# Patient Record
Sex: FEMALE | Race: WHITE | Employment: OTHER | URBAN - METROPOLITAN AREA
[De-identification: names, ages, dates, MRNs, and addresses within clinical notes are randomized per-mention and may not be internally consistent; named-entity substitution may affect disease eponyms.]

---

## 2019-01-11 ENCOUNTER — APPOINTMENT (OUTPATIENT)
Dept: GENERAL RADIOLOGY | Age: 79
End: 2019-01-11
Attending: EMERGENCY MEDICINE
Payer: MEDICARE

## 2019-01-11 ENCOUNTER — HOSPITAL ENCOUNTER (EMERGENCY)
Age: 79
Discharge: HOME | End: 2019-01-12
Attending: EMERGENCY MEDICINE
Payer: MEDICARE

## 2019-01-11 VITALS
DIASTOLIC BLOOD PRESSURE: 78 MMHG | SYSTOLIC BLOOD PRESSURE: 182 MMHG | BODY MASS INDEX: 41.16 KG/M2 | RESPIRATION RATE: 22 BRPM | HEIGHT: 61 IN | TEMPERATURE: 97.6 F | WEIGHT: 218 LBS | HEART RATE: 60 BPM | OXYGEN SATURATION: 95 %

## 2019-01-11 DIAGNOSIS — J06.9 RECENT URI: ICD-10-CM
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
## 2019-01-11 DIAGNOSIS — S60.221A CONTUSION OF RIGHT HAND, INITIAL ENCOUNTER: ICD-10-CM

## 2019-01-11 DIAGNOSIS — S62.91XA CLOSED FRACTURE OF RIGHT HAND, INITIAL ENCOUNTER: Primary | ICD-10-CM

## 2019-01-11 DIAGNOSIS — T14.8XXA HEMATOMA: ICD-10-CM

## 2019-01-11 DIAGNOSIS — R53.81 PHYSICAL DECONDITIONING: ICD-10-CM

## 2019-01-11 PROCEDURE — 73130 X-RAY EXAM OF HAND: CPT

## 2019-01-11 PROCEDURE — 71045 X-RAY EXAM CHEST 1 VIEW: CPT

## 2019-01-11 PROCEDURE — 99282 EMERGENCY DEPT VISIT SF MDM: CPT

## 2019-01-12 LAB
ALBUMIN SERPL-MCNC: 3.5 G/DL (ref 3.5–5)
ALBUMIN/GLOB SERPL: 1 {RATIO} (ref 1.1–2.2)
ALP SERPL-CCNC: 96 U/L (ref 45–117)
ALT SERPL-CCNC: 33 U/L (ref 12–78)
ANION GAP SERPL CALC-SCNC: 4 MMOL/L (ref 5–15)
APPEARANCE UR: CLEAR
AST SERPL-CCNC: 33 U/L (ref 15–37)
BACTERIA URNS QL MICRO: NEGATIVE /HPF
BASOPHILS # BLD: 0 K/UL (ref 0–0.1)
BASOPHILS NFR BLD: 0 % (ref 0–1)
BILIRUB SERPL-MCNC: 0.4 MG/DL (ref 0.2–1)
BILIRUB UR QL CFM: NEGATIVE
BUN SERPL-MCNC: 20 MG/DL (ref 6–20)
BUN/CREAT SERPL: 16 (ref 12–20)
CALCIUM SERPL-MCNC: 9.2 MG/DL (ref 8.5–10.1)
CHLORIDE SERPL-SCNC: 101 MMOL/L (ref 97–108)
CO2 SERPL-SCNC: 31 MMOL/L (ref 21–32)
COLOR UR: NORMAL
CREAT SERPL-MCNC: 1.22 MG/DL (ref 0.55–1.02)
DIFFERENTIAL METHOD BLD: ABNORMAL
EOSINOPHIL # BLD: 0.2 K/UL (ref 0–0.4)
EOSINOPHIL NFR BLD: 1 % (ref 0–7)
EPITH CASTS URNS QL MICRO: NORMAL /LPF
ERYTHROCYTE [DISTWIDTH] IN BLOOD BY AUTOMATED COUNT: 13.9 % (ref 11.5–14.5)
GLOBULIN SER CALC-MCNC: 3.5 G/DL (ref 2–4)
GLUCOSE SERPL-MCNC: 94 MG/DL (ref 65–100)
GLUCOSE UR STRIP.AUTO-MCNC: NEGATIVE MG/DL
HCT VFR BLD AUTO: 43.1 % (ref 35–47)
HGB BLD-MCNC: 14 G/DL (ref 11.5–16)
HGB UR QL STRIP: NEGATIVE
HYALINE CASTS URNS QL MICRO: NORMAL /LPF (ref 0–5)
IMM GRANULOCYTES # BLD AUTO: 0.1 K/UL (ref 0–0.04)
IMM GRANULOCYTES NFR BLD AUTO: 1 % (ref 0–0.5)
KETONES UR QL STRIP.AUTO: NEGATIVE MG/DL
LEUKOCYTE ESTERASE UR QL STRIP.AUTO: NEGATIVE
LYMPHOCYTES # BLD: 1.1 K/UL (ref 0.8–3.5)
LYMPHOCYTES NFR BLD: 9 % (ref 12–49)
MCH RBC QN AUTO: 31.1 PG (ref 26–34)
MCHC RBC AUTO-ENTMCNC: 32.5 G/DL (ref 30–36.5)
MCV RBC AUTO: 95.8 FL (ref 80–99)
MONOCYTES # BLD: 1 K/UL (ref 0–1)
MONOCYTES NFR BLD: 7 % (ref 5–13)
NEUTS SEG # BLD: 10.9 K/UL (ref 1.8–8)
NEUTS SEG NFR BLD: 82 % (ref 32–75)
NITRITE UR QL STRIP.AUTO: NEGATIVE
NRBC # BLD: 0 K/UL (ref 0–0.01)
NRBC BLD-RTO: 0 PER 100 WBC
PH UR STRIP: 5.5 [PH] (ref 5–8)
PLATELET # BLD AUTO: 240 K/UL (ref 150–400)
PMV BLD AUTO: 9.6 FL (ref 8.9–12.9)
POTASSIUM SERPL-SCNC: 4.7 MMOL/L (ref 3.5–5.1)
PROT SERPL-MCNC: 7 G/DL (ref 6.4–8.2)
PROT UR STRIP-MCNC: NEGATIVE MG/DL
RBC # BLD AUTO: 4.5 M/UL (ref 3.8–5.2)
RBC #/AREA URNS HPF: NORMAL /HPF (ref 0–5)
SODIUM SERPL-SCNC: 136 MMOL/L (ref 136–145)
SP GR UR REFRACTOMETRY: 1.02 (ref 1–1.03)
UA: UC IF INDICATED,UAUC: NORMAL
UROBILINOGEN UR QL STRIP.AUTO: 0.2 EU/DL (ref 0.2–1)
WBC # BLD AUTO: 13.2 K/UL (ref 3.6–11)
WBC URNS QL MICRO: NORMAL /HPF (ref 0–4)

## 2019-01-12 PROCEDURE — 80053 COMPREHEN METABOLIC PANEL: CPT

## 2019-01-12 PROCEDURE — 77030005110 HC CATH GASTMY BTN BARD -B

## 2019-01-12 PROCEDURE — 75810000053 HC SPLINT APPLICATION

## 2019-01-12 PROCEDURE — 85025 COMPLETE CBC W/AUTO DIFF WBC: CPT

## 2019-01-12 PROCEDURE — 81001 URINALYSIS AUTO W/SCOPE: CPT

## 2019-01-12 PROCEDURE — 36415 COLL VENOUS BLD VENIPUNCTURE: CPT

## 2019-01-12 PROCEDURE — 77030011943

## 2019-01-12 RX ORDER — OXYCODONE HYDROCHLORIDE 5 MG/1
TABLET ORAL
Status: DISCONTINUED
Start: 2019-01-12 | End: 2019-01-12 | Stop reason: HOSPADM

## 2019-01-12 NOTE — DISCHARGE INSTRUCTIONS
Patient Education        Hand Bruises: Care Instructions  Your Care Instructions  Bruises, or contusions, can happen as a result of an impact or fall. Most people think of a bruise as a black-and-blue spot. This happens when small blood vessels get torn and leak blood under the skin. The bruise may turn purplish black, reddish blue, or yellowish green as it heals. But bones and muscles can also get bruised. This may damage the hand but not cause a bruise that you can see. Most bruises aren't serious and will go away on their own in 2 to 4 weeks. But sometimes a more serious hand injury might not heal on its own. Tell your doctor if you have new symptoms or your injury is not getting better over time. You may have tests to see if you have bone or nerve damage. These tests may include X-rays, a CT scan, or an MRI. If you damaged bones or muscles, you may need more treatment. The doctor has checked you carefully, but problems can develop later. If you notice any problems or new symptoms, get medical treatment right away. Follow-up care is a key part of your treatment and safety. Be sure to make and go to all appointments, and call your doctor if you are having problems. It's also a good idea to know your test results and keep a list of the medicines you take. How can you care for yourself at home? · Put ice or a cold pack on the hand for 10 to 20 minutes at a time. Put a thin cloth between the ice and your skin. · Prop up your hand on a pillow when you ice it or anytime you sit or lie down during the next 3 days. Try to keep your hand above the level of your heart. This will help reduce swelling. · Be safe with medicines. Read and follow all instructions on the label. ? If the doctor gave you a prescription medicine for pain, take it as prescribed. ? If you are not taking a prescription pain medicine, ask your doctor if you can take an over-the-counter medicine.   · Be sure to follow your doctor's advice about moving and exercising your injured hand. When should you call for help? Call your doctor now or seek immediate medical care if:    · Your pain gets worse.     · You have new or worse swelling.     · You have tingling, weakness, or numbness in the area near the bruise.     · The area near the bruise is cold or pale.     · You have symptoms of infection, such as:  ? Increased pain, swelling, warmth, or redness. ? Red streaks leading from the area. ? Pus draining from the area. ? A fever.    Watch closely for changes in your health, and be sure to contact your doctor if:    · You do not get better as expected. Where can you learn more? Go to http://carlene-abhay.info/. Enter N538 in the search box to learn more about \"Hand Bruises: Care Instructions. \"  Current as of: November 20, 2017  Content Version: 11.8  © 1945-8438 [employer]. Care instructions adapted under license by [employer] (which disclaims liability or warranty for this information). If you have questions about a medical condition or this instruction, always ask your healthcare professional. [place] [address] any warranty or liability for your use of this information. Patient Education        Preventing Falls: Care Instructions  Your Care Instructions    Getting around your home safely can be a challenge if you have injuries or health problems that make it easy for you to fall. Loose rugs and furniture in walkways are among the dangers for many older people who have problems walking or who have poor eyesight. People who have conditions such as arthritis, osteoporosis, or dementia also have to be careful not to fall. You can make your home safer with a few simple measures. Follow-up care is a key part of your treatment and safety. Be sure to make and go to all appointments, and call your doctor if you are having problems.  It's also a good idea to know your test results and keep a list of the medicines you take. How can you care for yourself at home? Taking care of yourself  · You may get dizzy if you do not drink enough water. To prevent dehydration, drink plenty of fluids, enough so that your urine is light yellow or clear like water. Choose water and other caffeine-free clear liquids. If you have kidney, heart, or liver disease and have to limit fluids, talk with your doctor before you increase the amount of fluids you drink. · Exercise regularly to improve your strength, muscle tone, and balance. Walk if you can. Swimming may be a good choice if you cannot walk easily. · Have your vision and hearing checked each year or any time you notice a change. If you have trouble seeing and hearing, you might not be able to avoid objects and could lose your balance. · Know the side effects of the medicines you take. Ask your doctor or pharmacist whether the medicines you take can affect your balance. Sleeping pills or sedatives can affect your balance. · Limit the amount of alcohol you drink. Alcohol can impair your balance and other senses. · Ask your doctor whether calluses or corns on your feet need to be removed. If you wear loose-fitting shoes because of calluses or corns, you can lose your balance and fall. · Talk to your doctor if you have numbness in your feet. Preventing falls at home  · Remove raised doorway thresholds, throw rugs, and clutter. Repair loose carpet or raised areas in the floor. · Move furniture and electrical cords to keep them out of walking paths. · Use nonskid floor wax, and wipe up spills right away, especially on ceramic tile floors. · If you use a walker or cane, put rubber tips on it. If you use crutches, clean the bottoms of them regularly with an abrasive pad, such as steel wool. · Keep your house well lit, especially [place], and outside walkways. Use night-lights in areas such as hallways and bathrooms.  Add extra light switches or use remote switches (such as switches that go on or off when you clap your hands) to make it easier to turn lights on if you have to get up during the night. · Install sturdy handrails on stairways. · Move items in your cabinets so that the things you use a lot are on the lower shelves (about waist level). · Keep a cordless phone and a flashlight with new batteries by your bed. If possible, put a phone in each of the main rooms of your house, or carry a cell phone in case you fall and cannot reach a phone. Or, you can wear a device around your neck or wrist. You push a button that sends a signal for help. · Wear low-heeled shoes that fit well and give your feet good support. Use footwear with nonskid soles. Check the heels and soles of your shoes for wear. Repair or replace worn heels or soles. · Do not wear socks without shoes on wood floors. · Walk on the grass when the sidewalks are slippery. If you live in an area that gets snow and ice in the winter, sprinkle salt on slippery steps and sidewalks. Preventing falls in the bath  · Install grab bars and nonskid mats inside and outside your shower or tub and near the toilet and sinks. · Use shower chairs and bath benches. · Use a hand-held shower head that will allow you to sit while showering. · Get into a tub or shower by putting the weaker leg in first. Get out of a tub or shower with your strong side first.  · Repair loose toilet seats and consider installing a raised toilet seat to make getting on and off the toilet easier. · Keep your bathroom door unlocked while you are in the shower. Where can you learn more? Go to http://carlene-abhay.info/. Enter 0476 [id number] in the search box to learn more about \"Preventing Falls: Care Instructions. \"  Current as of: March 16, 2018  Content Version: 11.8  © 3670-7728 [employer].  Care instructions adapted under license by [employer] (which disclaims liability or warranty for this information). If you have questions about a medical condition or this instruction, always ask your healthcare professional. [place] [address] any warranty or liability for your use of this information.

## 2019-01-12 NOTE — ED NOTES
Dr. Steph Flower gave and reviewed discharge instructions with the patient. The patient verbalized understanding. The patient was given opportunity for questions. Patient discharged in stable condition to the waiting room via wheelchair with male and female visitors.

## 2019-01-12 NOTE — ED TRIAGE NOTES
Pt states she fell this morning and injured her rt hand and then fell again at [address] and hurt her back. Pt states her legs have been giving out.

## 2019-01-12 NOTE — ED PROVIDER NOTES
EMERGENCY DEPARTMENT HISTORY AND PHYSICAL EXAM      Date: 1/11/2019  Patient Name: Rizwana Cano    History of Presenting Illness     Chief Complaint   Patient presents with    Fall    Hand Pain       History Provided By: Patient and Patient's Daughter    HPI: Rizwana Cano, 67 y.o. female with PMHx significant for stroke, breast cancer, HTN, arthritis, GERD, presents via wheelchair to the ED with cc of acute onset right hand pain and swelling s/p GLF this morning. The pt reports she landed on her arm. She states she has been icing her hand all day. The pt additionally reports another fall today around 1930 this evening, noting her \"legs gave out. \" The pt notes mild back pain s/p the second fall. Per daughter, the pt was on 2 rounds of antibiotics recently for a URI. The pt notes generalized weakness from her sickness. However, she states her cold has improved. Pt notes she used inhalers in the past but has not been diagnosed asthma or COPD. Pt denies taking any anticoagulants. She specifically denies any lightheadedness, fevers, N/V/D, chest pain and leg pain. There are no other complaints, changes, or physical findings at this time. PCP: Manjula Hurst MD    No current facility-administered medications on file prior to encounter. Current Outpatient Medications on File Prior to Encounter   Medication Sig Dispense Refill    prednisone (DELTASONE) 10 mg tablet 4 tabs daily for 2 days   3 tabs daily for 2 days   2 tabs daily for 2 days   1 tab   daily for 2 days 20 tablet 0    PROAIR HFA 90 mcg/actuation inhaler       metoprolol succinate (TOPROL-XL) 50 mg XL tablet       phenytoin ER (DILANTIN ER) 100 mg ER capsule       citalopram (CELEXA) 40 mg tablet Take 40 mg by mouth daily.  diltiazem CD (CARTIA XT) 240 mg ER capsule Take 240 mg by mouth daily.  albuterol (PROVENTIL, VENTOLIN) 90 mcg/Actuation inhaler Take 1-2 Puffs by inhalation every four (4) hours as needed for Wheezing.  17 g 0  aspirin delayed-release 81 mg tablet Take 1 Tab by mouth daily. 100 Tab 3    ferrous sulfate (IRON) 325 mg (65 mg Iron) tablet Take 65 mg by mouth two (2) times a day.  simvastatin (ZOCOR) 40 mg tablet Take 40 mg by mouth nightly. Past History     Past Medical History:  Past Medical History:   Diagnosis Date    Arthritis     Bladder disorder     Cancer (HCC)     breast cancer    Gastrointestinal disorder     acid reflux    GERD (gastroesophageal reflux disease)     High cholesterol     Hypertension     Other ill-defined conditions(799.89)     pneumonia    Psychiatric disorder     depression    Seizures (HCC)     Stroke ([facility]     Thromboembolus ([facility]        Past Surgical History:  Past Surgical History:   Procedure Laterality Date    BREAST SURGERY PROCEDURE UNLISTED      leftt breast lumpectomy    HX CATARACT REMOVAL      bilateral    HX  SECTION      x 3    HX CHOLECYSTECTOMY      HX ORTHOPAEDIC      left arm, left leg, right wrist    HX ORTHOPAEDIC  13    ELBOW OPEN REDUCTION INTERNAL FIXATION (Right    HX OTHER SURGICAL      Brain surgery s/p CVA    HX OTHER SURGICAL      Bladder stimulator 12    HX OTHER SURGICAL      gastric bypass       Family History:  Family History   Problem Relation Age of Onset    Stroke Father        Social History:  Social History     Tobacco Use    Smoking status: Never Smoker    Smokeless tobacco: Never Used   Substance Use Topics    Alcohol use: No    Drug use: No       Allergies: Allergies   Allergen Reactions    Codeine Rash    Penicillins Swelling         Review of Systems   Review of Systems   Constitutional: Negative for activity change, appetite change, fatigue and fever. HENT: Negative. Negative for congestion, rhinorrhea and sore throat. Respiratory: Negative. Negative for cough, shortness of breath and wheezing. Cardiovascular: Negative. Negative for chest pain and leg swelling.    Gastrointestinal: Negative. Negative for abdominal distention, abdominal pain, constipation, diarrhea, nausea and vomiting. Endocrine: Negative. Genitourinary: Negative for difficulty urinating, dysuria, menstrual problem, vaginal bleeding and vaginal discharge. Musculoskeletal: Positive for arthralgias and back pain (mild). Negative for myalgias. Positive for R hand swelling and pain. Skin: Negative. Negative for rash. Neurological: Negative. Negative for dizziness, weakness, light-headedness and headaches. Psychiatric/Behavioral: Negative. Physical Exam   Physical Exam   Constitutional: She is oriented to person, place, and time. She appears well-developed and well-nourished. Tired-appearing. HENT:   Head: Atraumatic. Eyes: EOM are normal.   Cardiovascular: Normal rate, regular rhythm, normal heart sounds and intact distal pulses. Exam reveals no gallop and no friction rub. No murmur heard. Pulmonary/Chest: Effort normal and breath sounds normal. No respiratory distress. She has no wheezes. She has no rales. She exhibits no tenderness. Very mild pertussis breathing and grunting (pt's daughter notes this is baseline). Abdominal: Soft. Bowel sounds are normal. She exhibits no distension and no mass. There is no tenderness. There is no rebound and no guarding. Musculoskeletal: Normal range of motion. She exhibits no edema or tenderness. Severe swelling and ecchymosis to dorsal aspect of right hand. Swelling extending into fingers.  strength decreased secondary to pain. No snuff box tenderness. No proximal or distal radial or ulnar head tenderness. Able to pronate and supinate. Neurological: She is oriented to person, place, and time. Skin: Skin is warm. Psychiatric: She has a normal mood and affect. Nursing note and vitals reviewed.       Diagnostic Study Results     Labs -  Recent Results (from the past 12 hour(s))   CBC WITH AUTOMATED DIFF    Collection Time: 01/12/19 12:09 AM   Result Value Ref Range    WBC 13.2 (H) 3.6 - 11.0 K/uL    RBC 4.50 3.80 - 5.20 M/uL    HGB 14.0 11.5 - 16.0 g/dL    HCT 43.1 35.0 - 47.0 %    MCV 95.8 80.0 - 99.0 FL    MCH 31.1 26.0 - 34.0 PG    MCHC 32.5 30.0 - 36.5 g/dL    RDW 13.9 11.5 - 14.5 %    PLATELET 096 699 - 018 K/uL    MPV 9.6 8.9 - 12.9 FL    NRBC 0.0 0  WBC    ABSOLUTE NRBC 0.00 0.00 - 0.01 K/uL    NEUTROPHILS 82 (H) 32 - 75 %    LYMPHOCYTES 9 (L) 12 - 49 %    MONOCYTES 7 5 - 13 %    EOSINOPHILS 1 0 - 7 %    BASOPHILS 0 0 - 1 %    IMMATURE GRANULOCYTES 1 (H) 0.0 - 0.5 %    ABS. NEUTROPHILS 10.9 (H) 1.8 - 8.0 K/UL    ABS. LYMPHOCYTES 1.1 0.8 - 3.5 K/UL    ABS. MONOCYTES 1.0 0.0 - 1.0 K/UL    ABS. EOSINOPHILS 0.2 0.0 - 0.4 K/UL    ABS. BASOPHILS 0.0 0.0 - 0.1 K/UL    ABS. IMM.  GRANS. 0.1 (H) 0.00 - 0.04 K/UL    DF AUTOMATED     URINALYSIS W/ REFLEX CULTURE    Collection Time: 01/12/19 12:15 AM   Result Value Ref Range    Color YELLOW/STRAW      Appearance CLEAR CLEAR      Specific gravity 1.019 1.003 - 1.030      pH (UA) 5.5 5.0 - 8.0      Protein NEGATIVE  NEG mg/dL    Glucose NEGATIVE  NEG mg/dL    Ketone NEGATIVE  NEG mg/dL    Blood NEGATIVE  NEG      Urobilinogen 0.2 0.2 - 1.0 EU/dL    Nitrites NEGATIVE  NEG      Leukocyte Esterase NEGATIVE  NEG      WBC 0-4 0 - 4 /hpf    RBC 0-5 0 - 5 /hpf    Epithelial cells FEW FEW /lpf    Bacteria NEGATIVE  NEG /hpf    UA:UC IF INDICATED CULTURE NOT INDICATED BY UA RESULT CNI      Hyaline cast 0-2 0 - 5 /lpf   BILIRUBIN, CONFIRM    Collection Time: 01/12/19 12:15 AM   Result Value Ref Range    Bilirubin UA, confirm NEGATIVE  NEG     METABOLIC PANEL, COMPREHENSIVE    Collection Time: 01/12/19 12:41 AM   Result Value Ref Range    Sodium 136 136 - 145 mmol/L    Potassium 4.7 3.5 - 5.1 mmol/L    Chloride 101 97 - 108 mmol/L    CO2 31 21 - 32 mmol/L    Anion gap 4 (L) 5 - 15 mmol/L    Glucose 94 65 - 100 mg/dL    BUN 20 6 - 20 MG/DL    Creatinine 1.22 (H) 0.55 - 1.02 MG/DL    BUN/Creatinine ratio 16 12 - 20      GFR est AA 52 (L) >60 ml/min/1.73m2    GFR est non-AA 43 (L) >60 ml/min/1.73m2    Calcium 9.2 8.5 - 10.1 MG/DL    Bilirubin, total 0.4 0.2 - 1.0 MG/DL    ALT (SGPT) 33 12 - 78 U/L    AST (SGOT) 33 15 - 37 U/L    Alk. phosphatase 96 45 - 117 U/L    Protein, total 7.0 6.4 - 8.2 g/dL    Albumin 3.5 3.5 - 5.0 g/dL    Globulin 3.5 2.0 - 4.0 g/dL    A-G Ratio 1.0 (L) 1.1 - 2.2         Radiologic Studies -   XR HAND RT MIN 3 V   Final Result   IMPRESSION: Nondisplaced fractures third through fifth metacarpals. XR CHEST PORT   Final Result   Impression: No acute process. CXR Results  (Last 48 hours)               01/12/19 0032  XR CHEST PORT Final result    Impression:  Impression: No acute process. Narrative: Indication: Chest pain, status post fall       Comparison: None       Portable exam of the chest obtained at 1216 demonstrates cardiomegaly. The aorta   is tortuous and atherosclerotic. Scarring is noted in the right middle lobe. There is no acute process in the lung fields. The osseous structures are   unremarkable. Medical Decision Making   I am the first provider for this patient. I reviewed the vital signs, available nursing notes, past medical history, past surgical history, family history and social history. Vital Signs-Reviewed the patient's vital signs. Patient Vitals for the past 12 hrs:   Temp Pulse Resp BP SpO2   01/11/19 2317 97.6 °F (36.4 °C) 60 22 182/78 95 %       Pulse Oximetry Analysis - 95% on RA    Cardiac Monitor:   Rate: 60 bpm  Rhythm: Normal Sinus Rhythm      Records Reviewed: Nursing Notes and Old Medical Records    Provider Notes (Medical Decision Making): Impression/Plan: Sprain, strain, fracture, contusion  Of note, pt has two falls today which appear to be contributing from generalized deconditioning. Pt notes she has not been getting up or out of her chair from 2 weeks due to a respiratory illness.  Will perform basic labs and chest xray. ED Course:   Initial assessment performed. The patients presenting problems have been discussed, and they are in agreement with the care plan formulated and outlined with them. I have encouraged them to ask questions as they arise throughout their visit. PROGRESS NOTE:  12:47 AM  Tiger texted Dr. Leeanna Aviles for ortho consult. Written by Aftab Gr ED Scribe, as dictated by Mauri Chow MD.    PROGRESS NOTE:  12:54 AM  Dr. Leeanna Aviles agrees with plan for splint placement. Recommends f/u on Monday or Tuesday. Notes to keep hand elevated. Written by Aftab Gr ED Scribe, as dictated by Mauri Chow MD.    Procedure Note - Splint Placement:  12:51 AM  Performed by: Neurovascularly intact prior to tx. An Orthoglass posterior splint was placed on pt's right hand. Joint was placed in neutral position. Neurovascularly intact after tx. The procedure took 1-15 minutes, and pt tolerated well. Critical Care Time:   None    Disposition:  Discharge Note:  1:36 AM  The patient has been re-evaluated and is ready for discharge. Reviewed available results with patient. Counseled patient/parent/guardian on diagnosis and care plan. Patient has expressed understanding, and all questions have been answered. Patient agrees with plan and agrees to follow up as recommended, or return to the ED if their symptoms worsen. Discharge instructions have been provided and explained to the patient, along with reasons to return to the ED. PLAN:  1. Discharge  Current Discharge Medication List        2. Follow-up Information     Follow up With Specialties Details Why Contact Info    Odalys Smiley MD Orthopedic Surgery, Hand Surgery  call Monday for an appointment early next week. [address] [place] [address],[address]  [facility]  [phone]          Return to ED if worse     Diagnosis     Clinical Impression:   1. Closed fracture of right hand, initial encounter    2. Hematoma    3.  Contusion of right hand, initial encounter    4. Fall, initial encounter    5. Physical deconditioning    6. Recent URI        Attestations: This note is prepared by Alonso Mora, acting as Scribe for MD Gamal Daniel MD: The scribe's documentation has been prepared under my direction and personally reviewed by me in its entirety. I confirm that the note above accurately reflects all work, treatment, procedures, and medical decision making performed by me.

## 2019-01-23 ENCOUNTER — HOSPITAL ENCOUNTER (EMERGENCY)
Age: 79
Discharge: HOME | End: 2019-01-23
Attending: EMERGENCY MEDICINE
Payer: MEDICARE

## 2019-01-23 ENCOUNTER — APPOINTMENT (OUTPATIENT)
Dept: GENERAL RADIOLOGY | Age: 79
End: 2019-01-23
Attending: EMERGENCY MEDICINE
Payer: MEDICARE

## 2019-01-23 ENCOUNTER — APPOINTMENT (OUTPATIENT)
Dept: ULTRASOUND IMAGING | Age: 79
End: 2019-01-23
Attending: EMERGENCY MEDICINE
Payer: MEDICARE

## 2019-01-23 VITALS
WEIGHT: 210 LBS | RESPIRATION RATE: 17 BRPM | SYSTOLIC BLOOD PRESSURE: 189 MMHG | OXYGEN SATURATION: 97 % | HEIGHT: 63 IN | HEART RATE: 66 BPM | TEMPERATURE: 98 F | DIASTOLIC BLOOD PRESSURE: 83 MMHG | BODY MASS INDEX: 37.21 KG/M2

## 2019-01-23 DIAGNOSIS — I10 HYPERTENSION, UNSPECIFIED TYPE: ICD-10-CM

## 2019-01-23 DIAGNOSIS — R07.9 CHEST PAIN, UNSPECIFIED TYPE: Primary | ICD-10-CM

## 2019-01-23 LAB
ALBUMIN SERPL-MCNC: 3.2 G/DL (ref 3.5–5)
ALBUMIN/GLOB SERPL: 0.9 {RATIO} (ref 1.1–2.2)
ALP SERPL-CCNC: 156 U/L (ref 45–117)
ALT SERPL-CCNC: 22 U/L (ref 12–78)
ANION GAP SERPL CALC-SCNC: 7 MMOL/L (ref 5–15)
AST SERPL-CCNC: 27 U/L (ref 15–37)
ATRIAL RATE: 59 BPM
BASOPHILS # BLD: 0 K/UL (ref 0–0.1)
BASOPHILS NFR BLD: 1 % (ref 0–1)
BILIRUB SERPL-MCNC: 0.3 MG/DL (ref 0.2–1)
BUN SERPL-MCNC: 19 MG/DL (ref 6–20)
BUN/CREAT SERPL: 24 (ref 12–20)
CALCIUM SERPL-MCNC: 8.9 MG/DL (ref 8.5–10.1)
CALCULATED R AXIS, ECG10: -29 DEGREES
CALCULATED T AXIS, ECG11: 54 DEGREES
CHLORIDE SERPL-SCNC: 109 MMOL/L (ref 97–108)
CO2 SERPL-SCNC: 27 MMOL/L (ref 21–32)
CREAT SERPL-MCNC: 0.79 MG/DL (ref 0.55–1.02)
DIAGNOSIS, 93000: NORMAL
DIFFERENTIAL METHOD BLD: NORMAL
EOSINOPHIL # BLD: 0.3 K/UL (ref 0–0.4)
EOSINOPHIL NFR BLD: 5 % (ref 0–7)
ERYTHROCYTE [DISTWIDTH] IN BLOOD BY AUTOMATED COUNT: 14.4 % (ref 11.5–14.5)
GLOBULIN SER CALC-MCNC: 3.4 G/DL (ref 2–4)
GLUCOSE SERPL-MCNC: 80 MG/DL (ref 65–100)
HCT VFR BLD AUTO: 43.2 % (ref 35–47)
HGB BLD-MCNC: 13.9 G/DL (ref 11.5–16)
IMM GRANULOCYTES # BLD AUTO: 0 K/UL (ref 0–0.04)
IMM GRANULOCYTES NFR BLD AUTO: 0 % (ref 0–0.5)
LYMPHOCYTES # BLD: 1.2 K/UL (ref 0.8–3.5)
LYMPHOCYTES NFR BLD: 19 % (ref 12–49)
MCH RBC QN AUTO: 30.9 PG (ref 26–34)
MCHC RBC AUTO-ENTMCNC: 32.2 G/DL (ref 30–36.5)
MCV RBC AUTO: 96 FL (ref 80–99)
MONOCYTES # BLD: 0.5 K/UL (ref 0–1)
MONOCYTES NFR BLD: 8 % (ref 5–13)
NEUTS SEG # BLD: 4.2 K/UL (ref 1.8–8)
NEUTS SEG NFR BLD: 67 % (ref 32–75)
NRBC # BLD: 0 K/UL (ref 0–0.01)
NRBC BLD-RTO: 0 PER 100 WBC
P-R INTERVAL, ECG05: 166 MS
PLATELET # BLD AUTO: 289 K/UL (ref 150–400)
PMV BLD AUTO: 9.1 FL (ref 8.9–12.9)
POTASSIUM SERPL-SCNC: 3.9 MMOL/L (ref 3.5–5.1)
PROT SERPL-MCNC: 6.6 G/DL (ref 6.4–8.2)
Q-T INTERVAL, ECG07: 470 MS
QRS DURATION, ECG06: 102 MS
QTC CALCULATION (BEZET), ECG08: 465 MS
RBC # BLD AUTO: 4.5 M/UL (ref 3.8–5.2)
SODIUM SERPL-SCNC: 143 MMOL/L (ref 136–145)
TROPONIN I SERPL-MCNC: <0.05 NG/ML
TROPONIN I SERPL-MCNC: <0.05 NG/ML
VENTRICULAR RATE, ECG03: 59 BPM
WBC # BLD AUTO: 6.2 K/UL (ref 3.6–11)

## 2019-01-23 PROCEDURE — 85025 COMPLETE CBC W/AUTO DIFF WBC: CPT

## 2019-01-23 PROCEDURE — 71045 X-RAY EXAM CHEST 1 VIEW: CPT

## 2019-01-23 PROCEDURE — 36415 COLL VENOUS BLD VENIPUNCTURE: CPT

## 2019-01-23 PROCEDURE — 93005 ELECTROCARDIOGRAM TRACING: CPT

## 2019-01-23 PROCEDURE — 80053 COMPREHEN METABOLIC PANEL: CPT

## 2019-01-23 PROCEDURE — 84484 ASSAY OF TROPONIN QUANT: CPT

## 2019-01-23 PROCEDURE — 93971 EXTREMITY STUDY: CPT

## 2019-01-23 PROCEDURE — 74011250637 HC RX REV CODE- 250/637: Performed by: EMERGENCY MEDICINE

## 2019-01-23 PROCEDURE — 99285 EMERGENCY DEPT VISIT HI MDM: CPT

## 2019-01-23 RX ORDER — HYDROCHLOROTHIAZIDE 25 MG/1
25 TABLET ORAL
Status: DISCONTINUED | OUTPATIENT
Start: 2019-01-23 | End: 2019-01-23 | Stop reason: HOSPADM

## 2019-01-23 RX ORDER — GUAIFENESIN 100 MG/5ML
324 LIQUID (ML) ORAL
Status: COMPLETED | OUTPATIENT
Start: 2019-01-23 | End: 2019-01-23

## 2019-01-23 RX ADMIN — ASPIRIN 81 MG 324 MG: 81 TABLET ORAL at 14:14

## 2019-01-23 NOTE — ED PROVIDER NOTES
EMERGENCY DEPARTMENT HISTORY AND PHYSICAL EXAM      Date: 1/23/2019  Patient Name: Santosh Rodriguez    History of Presenting Illness     Chief Complaint   Patient presents with    Chest Pain     pt arrives by EMS with reports of chest pain x1 hour       History Provided By: Patient    HPI: Santosh Rodriguez, 67 y.o. female with PMHx significant for HTN, thromboembolus, breast CA, stroke, presents via EMS to the ED with cc of constant CP x 11 AM today. She reports mild swelling in right ankle that she noticed this morning. Pt reports chronic dyspnea on exertion. She states she has never had similar pain before. Pt denies any exacerbating factors. She denies any trauma or injury to ankle. Pt denies recent long trips or surgery. She denies associated nausea, fever, or chills. Pt denies cough or any urinary sxs. She is currently Asx. She did not take her antiyhypertensives today. There are no other complaints, changes, or physical findings at this time. PCP: Vera Trinh MD    No current facility-administered medications on file prior to encounter. Current Outpatient Medications on File Prior to Encounter   Medication Sig Dispense Refill    prednisone (DELTASONE) 10 mg tablet 4 tabs daily for 2 days   3 tabs daily for 2 days   2 tabs daily for 2 days   1 tab   daily for 2 days 20 tablet 0    PROAIR HFA 90 mcg/actuation inhaler       metoprolol succinate (TOPROL-XL) 50 mg XL tablet       phenytoin ER (DILANTIN ER) 100 mg ER capsule       citalopram (CELEXA) 40 mg tablet Take 40 mg by mouth daily.  diltiazem CD (CARTIA XT) 240 mg ER capsule Take 240 mg by mouth daily.  albuterol (PROVENTIL, VENTOLIN) 90 mcg/Actuation inhaler Take 1-2 Puffs by inhalation every four (4) hours as needed for Wheezing. 17 g 0    aspirin delayed-release 81 mg tablet Take 1 Tab by mouth daily. 100 Tab 3    ferrous sulfate (IRON) 325 mg (65 mg Iron) tablet Take 65 mg by mouth two (2) times a day.       simvastatin (ZOCOR) 40 mg tablet Take 40 mg by mouth nightly. Past History     Past Medical History:  Past Medical History:   Diagnosis Date    Arthritis     Bladder disorder     Cancer (HCC)     breast cancer    Gastrointestinal disorder     acid reflux    GERD (gastroesophageal reflux disease)     High cholesterol     Hypertension     Other ill-defined conditions(799.89)     pneumonia    Psychiatric disorder     depression    Seizures (HCC)     Stroke ([facility] [address]     Thromboembolus ([facility] [address]        Past Surgical History:  Past Surgical History:   Procedure Laterality Date    BREAST SURGERY PROCEDURE UNLISTED      leftt breast lumpectomy    HX CATARACT REMOVAL      bilateral    HX  SECTION      x 3    HX CHOLECYSTECTOMY      HX ORTHOPAEDIC      left arm, left leg, right wrist    HX ORTHOPAEDIC  13    ELBOW OPEN REDUCTION INTERNAL FIXATION (Right    HX OTHER SURGICAL      Brain surgery s/p CVA    HX OTHER SURGICAL      Bladder stimulator 12    HX OTHER SURGICAL      gastric bypass       Family History:  Family History   Problem Relation Age of Onset    Stroke Father        Social History:  Social History     Tobacco Use    Smoking status: Never Smoker    Smokeless tobacco: Never Used   Substance Use Topics    Alcohol use: No    Drug use: No       Allergies: Allergies   Allergen Reactions    Codeine Rash    Penicillins Swelling         Review of Systems   Review of Systems   Constitutional: Negative for chills, fatigue and fever. HENT: Negative for congestion, ear pain and rhinorrhea. Eyes: Negative for pain and visual disturbance. Respiratory: Positive for shortness of breath (chronic dyspnea on exertion). Negative for cough. Cardiovascular: Positive for chest pain and leg swelling (right ankle). Gastrointestinal: Negative for abdominal pain, diarrhea, nausea and vomiting.    Genitourinary: Negative for decreased urine volume, difficulty urinating, dysuria, enuresis, flank pain, frequency and urgency. Musculoskeletal: Negative for back pain and neck pain. Skin: Negative for rash and wound. Neurological: Negative for dizziness, syncope and headaches. Psychiatric/Behavioral: Negative for self-injury and suicidal ideas. Physical Exam   Physical Exam     GENERAL: alert and oriented, no acute distress  EYES: PEERL, No injection, discharge or icterus. HENT: Mucous membranes pink and moist.  NECK: Supple  LUNGS: Airway patent. Non-labored respirations. Breath sounds clear with good air entry bilaterally. HEART: Regular rate and rhythm. No peripheral edema  ABDOMEN: Non-distended and non-tender, without guarding or rebound. SKIN:  warm, dry  MSK/ EXTREMITIES: trace pitting edema right ankle without tenderness or deformity. cast on RUE.   NEUROLOGICAL: Alert, oriented        Diagnostic Study Results     Labs -     Recent Results (from the past 12 hour(s))   EKG, 12 LEAD, INITIAL    Collection Time: 01/23/19  1:09 PM   Result Value Ref Range    Ventricular Rate 59 BPM    Atrial Rate 59 BPM    P-R Interval 166 ms    QRS Duration 102 ms    Q-T Interval 470 ms    QTC Calculation (Bezet) 465 ms    Calculated R Axis -29 degrees    Calculated T Axis 54 degrees    Diagnosis       Sinus bradycardia  Voltage criteria for left ventricular hypertrophy  When compared with ECG of 28-SEP-2014 23:02,  No significant change was found  Confirmed by Ximena Gomez ([id number]) on 1/23/2019 4:23:20 PM     CBC WITH AUTOMATED DIFF    Collection Time: 01/23/19  1:36 PM   Result Value Ref Range    WBC 6.2 3.6 - 11.0 K/uL    RBC 4.50 3.80 - 5.20 M/uL    HGB 13.9 11.5 - 16.0 g/dL    HCT 43.2 35.0 - 47.0 %    MCV 96.0 80.0 - 99.0 FL    MCH 30.9 26.0 - 34.0 PG    MCHC 32.2 30.0 - 36.5 g/dL    RDW 14.4 11.5 - 14.5 %    PLATELET 249 833 - 133 K/uL    MPV 9.1 8.9 - 12.9 FL    NRBC 0.0 0  WBC    ABSOLUTE NRBC 0.00 0.00 - 0.01 K/uL    NEUTROPHILS 67 32 - 75 %    LYMPHOCYTES 19 12 - 49 %    MONOCYTES 8 5 - 13 %    EOSINOPHILS 5 0 - 7 %    BASOPHILS 1 0 - 1 %    IMMATURE GRANULOCYTES 0 0.0 - 0.5 %    ABS. NEUTROPHILS 4.2 1.8 - 8.0 K/UL    ABS. LYMPHOCYTES 1.2 0.8 - 3.5 K/UL    ABS. MONOCYTES 0.5 0.0 - 1.0 K/UL    ABS. EOSINOPHILS 0.3 0.0 - 0.4 K/UL    ABS. BASOPHILS 0.0 0.0 - 0.1 K/UL    ABS. IMM. GRANS. 0.0 0.00 - 0.04 K/UL    DF AUTOMATED     METABOLIC PANEL, COMPREHENSIVE    Collection Time: 01/23/19  1:36 PM   Result Value Ref Range    Sodium 143 136 - 145 mmol/L    Potassium 3.9 3.5 - 5.1 mmol/L    Chloride 109 (H) 97 - 108 mmol/L    CO2 27 21 - 32 mmol/L    Anion gap 7 5 - 15 mmol/L    Glucose 80 65 - 100 mg/dL    BUN 19 6 - 20 MG/DL    Creatinine 0.79 0.55 - 1.02 MG/DL    BUN/Creatinine ratio 24 (H) 12 - 20      GFR est AA >60 >60 ml/min/1.73m2    GFR est non-AA >60 >60 ml/min/1.73m2    Calcium 8.9 8.5 - 10.1 MG/DL    Bilirubin, total 0.3 0.2 - 1.0 MG/DL    ALT (SGPT) 22 12 - 78 U/L    AST (SGOT) 27 15 - 37 U/L    Alk. phosphatase 156 (H) 45 - 117 U/L    Protein, total 6.6 6.4 - 8.2 g/dL    Albumin 3.2 (L) 3.5 - 5.0 g/dL    Globulin 3.4 2.0 - 4.0 g/dL    A-G Ratio 0.9 (L) 1.1 - 2.2     TROPONIN I    Collection Time: 01/23/19  1:36 PM   Result Value Ref Range    Troponin-I, Qt. <0.05 <0.05 ng/mL   TROPONIN I    Collection Time: 01/23/19  4:46 PM   Result Value Ref Range    Troponin-I, Qt. <0.05 <0.05 ng/mL       Radiologic Studies -   DUPLEX LOWER EXT VENOUS RIGHT   Final Result   Impression:    1. No evidence of right lower extremity deep venous thrombosis. XR CHEST PORT   Final Result   IMPRESSION:    1. No acute cardiopulmonary process. 2. Unchanged cardiomegaly, left basilar atelectasis and elevation of the left   hemidiaphragm, and right midlung atelectasis/scarring. CXR Results  (Last 48 hours)               01/23/19 1407  XR CHEST PORT Final result    Impression:  IMPRESSION:    1. No acute cardiopulmonary process.    2. Unchanged cardiomegaly, left basilar atelectasis and elevation of the left   hemidiaphragm, and right midlung atelectasis/scarring. Narrative:  EXAM:  XR CHEST PORT       INDICATION:   chest pain       COMPARISON: Chest radiograph 1/12/2019. FINDINGS: AP radiograph of the chest was obtained. Stable linear atelectasis/scarring in the right midlung. Stable elevation of the   left hemidiaphragm with left basilar atelectasis. No new focal consolidation. No   pleural effusion or pneumothorax. Unchanged cardiomegaly and calcifications of   the thoracic aorta. No acute osseous abnormalities. DUPLEX LOWER EXT VENOUS RIGHT (Final result)   Result time 01/23/19 15:32:09   Final result by Lilli Greenfield MD (01/23/19 15:32:09)                Impression:    Impression:   1. No evidence of right lower extremity deep venous thrombosis. Narrative:    Examination: DUPLEX LOWER EXT VENOUS RIGHT    Comparison: Lower extremity Doppler 8/18/2016. Additional clinical data: RLE swelling    Findings:  Gray-scale, color, and spectral Doppler ultrasound of the right common femoral  vein, central portions of the greater saphenous and deep femoral vein, the  femoral, popliteal, and the calf vein was performed. The deep veins are  compressible and demonstrate normal phasic flow. Medical Decision Making   I am the first provider for this patient. I reviewed the vital signs, available nursing notes, past medical history, past surgical history, family history and social history. Vital Signs-Reviewed the patient's vital signs. Patient Vitals for the past 12 hrs:   Temp Pulse Resp BP SpO2   01/23/19 1706 -- 66 17 -- 97 %   01/23/19 1705 -- 66 23 189/83 --   01/23/19 1301 98 °F (36.7 °C) 61 18 197/74 96 %       EKG interpretation: (Preliminary) 13:09  Rhythm: sinus bradycardia; and regular . Rate (approx.): 59;  Axis: normal; SC interval: normal; QRS interval: normal ; ST/T wave: normal.  Written by Tora Severe, ED Scribe, as dictated by Karly Pineda MD.    Records Reviewed: Nursing Notes, Old Medical Records, Previous Radiology Studies and Previous Laboratory Studies    Provider Notes (Medical Decision Making): On presentation the patient is well appearing, in no acute distress with reassurnig vital signs. Based on the history and exam the differential diagnosis for this patient includes ACS, PE  MSK chest pain, pleurisy, costochondritis, bronchitis, GERD, esophageal spasm. The pt is unlikely to have PE. There is no pleuritic chest pain, no tachycardia, no hypoxia, no hormone use, no recent travel/immobilization, no recent surgery. There was some right ankle swelling with negative DVT US. As she is curretly ASX do not feel that PE workup is indicated at this time. The pt is unlikely to have aortic dissection. The pulses are equal, there is no sharp tearing chest pain radiating to back. .    Endocarditis unlikely -- no IV drug abuse, native valve, no fevers, patient well appearing, no murmurs/splinter hemorrhages/janeway lesions or oslar nodes    The pt is unlikley to have esophageal rupture. There is no history of forceful vomiting, patient well appearing, no difficulty swallowing    Will pursue cardiac work-up with EKG, troponin, CXR. While the pt is less likely to have pneumonia as there is no cough and no fever, and less likely to have ptx, will order CXR to assess lung fields     5:37 PM    I have re-examined the patient and the patient denies any new or changing symptoms. The patient has had two negative  troponins at this time and an EKG without any signs of acute ischemia and has remained chest pain free. The diagnosis, follow up, return instructions, test esults, x-rays and medications have been discussed and reviewed with the patient. The patient has been given the opportunity to ask questions. The patient expresses understanding of the diagnosis, follow-up and return instructions.  The patient agrees to follow up with cardiology as directed and to return immediately if the chest pain worsens. The patient expresses understanding that although cardiac testing at this time is negative, a cardiac problem could still be present and that a follow-up appointment with a cardiologist for further evaluation and risk factor modification is necessary to complete the evaluation of this complaint. Faisal Kaur MD      ED Course:   Initial assessment performed. The patients presenting problems have been discussed, and they are in agreement with the care plan formulated and outlined with them. I have encouraged them to ask questions as they arise throughout their visit. HYPERTENSION COUNSELING:   Patient denies any current chest pain, headache, shortness of breath, lightheadedness, dizziness, or changes in vision. Patient is made aware of their elevated blood pressure and is instructed to follow up this week with their Primary Care for a recheck. Patient is counseled regarding consequences of chronic, uncontrolled hypertension including kidney disease, heart disease, stroke or even death. Patient verbally states their understanding. Disposition:  Discharge Note:  6:10 PM  The pt is ready for discharge. The pt's signs, symptoms, diagnosis, and discharge instructions have been discussed and pt has conveyed their understanding. The pt is to follow up as recommended or return to ER should their symptoms worsen. Plan has been discussed and pt is in agreement. PLAN:  1. Discharge Medication List as of 1/23/2019  6:08 PM        2.    Follow-up Information     Follow up With Specialties Details Why Contact Info    Arabella Jones MD [facility] Schedule an appointment as soon as possible for a visit in 1 day  [address] [facility]  [address] [facility]  Call in 1 day  [address]  [address]        Return to ED if worse Diagnosis     Clinical Impression:   1. Chest pain, unspecified type    2. Hypertension, unspecified type        Attestations: This note is prepared by Dayna Garvin, acting as a Scribe for [employer]. MD Lupe Combs MD: The scribe's documentation has been prepared under my direction and personally reviewed by me in its entirety. I confirm that the notes above accurately reflects all work, treatment, procedures, and medical decision making performed by me.

## 2019-01-23 NOTE — ED NOTES
Provider at bedside for dispo and follow up. Discharge plan reviewed and paperwork signed, teaching completed including treatment received, medications and follow up plan of care, pain level within manageable comfortable limits, IV removed, wheeled to exit, gait steady, safety maintained.

## 2019-01-23 NOTE — DISCHARGE INSTRUCTIONS
Patient Education        Chest Pain: Care Instructions  Your Care Instructions    There are many things that can cause chest pain. Some are not serious and will get better on their own in a few days. But some kinds of chest pain need more testing and treatment. Your doctor may have recommended a follow-up visit in the next 8 to 12 hours. If you are not getting better, you may need more tests or treatment. Even though your doctor has released you, you still need to watch for any problems. The doctor carefully checked you, but sometimes problems can develop later. If you have new symptoms or if your symptoms do not get better, get medical care right away. If you have worse or different chest pain or pressure that lasts more than 5 minutes or you passed out (lost consciousness), call 911 or seek other emergency help right away. A medical visit is only one step in your treatment. Even if you feel better, you still need to do what your doctor recommends, such as going to all suggested follow-up appointments and taking medicines exactly as directed. This will help you recover and help prevent future problems. How can you care for yourself at home? · Rest until you feel better. · Take your medicine exactly as prescribed. Call your doctor if you think you are having a problem with your medicine. · Do not drive after taking a prescription pain medicine. When should you call for help? Call 911 if:    · You passed out (lost consciousness).     · You have severe difficulty breathing.     · You have symptoms of a heart attack. These may include:  ? Chest pain or pressure, or a strange feeling in your chest.  ? Sweating. ? Shortness of breath. ? Nausea or vomiting. ? Pain, pressure, or a strange feeling in your back, neck, jaw, or upper belly or in one or both shoulders or arms. ? Lightheadedness or sudden weakness. ? A fast or irregular heartbeat.   After you call 911, the  may tell you to chew 1 adult-strength or 2 to 4 low-dose aspirin. Wait for an ambulance. Do not try to drive yourself.    Call your doctor today if:    · You have any trouble breathing.     · Your chest pain gets worse.     · You are dizzy or lightheaded, or you feel like you may faint.     · You are not getting better as expected.     · You are having new or different chest pain. Where can you learn more? Go to http://carlene-abhay.info/. Enter A120 in the search box to learn more about \"Chest Pain: Care Instructions. \"  Current as of: September 23, 2018  Content Version: 11.9  © 5545-0013 [employer]. Care instructions adapted under license by [employer] (which disclaims liability or warranty for this information). If you have questions about a medical condition or this instruction, always ask your healthcare professional. [place] [address] any warranty or liability for your use of this information. Patient Education        High Blood Pressure: Care Instructions  Overview    It's normal for blood pressure to go up and down throughout the day. But if it stays up, you have high blood pressure. Another name for high blood pressure is hypertension. Despite what a lot of people think, high blood pressure usually doesn't cause headaches or make you feel dizzy or lightheaded. It usually has no symptoms. But it does increase your risk of stroke, heart attack, and other problems. You and your doctor will talk about your risks of these problems based on your blood pressure. Your doctor will give you a goal for your blood pressure. Your goal will be based on your health and your age. Lifestyle changes, such as eating healthy and being active, are always important to help lower blood pressure. You might also take medicine to reach your blood pressure goal.  Follow-up care is a key part of your treatment and safety.  Be sure to make and go to all appointments, and call your doctor if you are having problems. It's also a good idea to know your test results and keep a list of the medicines you take. How can you care for yourself at home? Medical treatment  · If you stop taking your medicine, your blood pressure will go back up. You may take one or more types of medicine to lower your blood pressure. Be safe with medicines. Take your medicine exactly as prescribed. Call your doctor if you think you are having a problem with your medicine. · Talk to your doctor before you start taking aspirin every day. Aspirin can help certain people lower their risk of a heart attack or stroke. But taking aspirin isn't right for everyone, because it can cause serious bleeding. · See your doctor regularly. You may need to see the doctor more often at first or until your blood pressure comes down. · If you are taking blood pressure medicine, talk to your doctor before you take decongestants or anti-inflammatory medicine, such as ibuprofen. Some of these medicines can raise blood pressure. · Learn how to check your blood pressure at home. Lifestyle changes  · Stay at a healthy weight. This is especially important if you put on weight around the waist. Losing even 10 pounds can help you lower your blood pressure. · If your doctor recommends it, get more exercise. Walking is a good choice. Bit by bit, increase the amount you walk every day. Try for at least 30 minutes on most days of the week. You also may want to swim, bike, or do other activities. · Avoid or limit alcohol. Talk to your doctor about whether you can drink any alcohol. · Try to limit how much sodium you eat to less than 2,300 milligrams (mg) a day. Your doctor may ask you to try to eat less than 1,500 mg a day. · Eat plenty of fruits (such as bananas and oranges), vegetables, legumes, whole grains, and low-fat dairy products. · Lower the amount of saturated fat in your diet.  Saturated fat is found in animal products such as milk, cheese, and meat. Limiting these foods may help you lose weight and also lower your risk for heart disease. · Do not smoke. Smoking increases your risk for heart attack and stroke. If you need help quitting, talk to your doctor about stop-smoking programs and medicines. These can increase your chances of quitting for good. When should you call for help? Call 911 anytime you think you may need emergency care. This may mean having symptoms that suggest that your blood pressure is causing a serious heart or blood vessel problem. Your blood pressure may be over 180/120.   For example, call 911 if:    · You have symptoms of a heart attack. These may include:  ? Chest pain or pressure, or a strange feeling in the chest.  ? Sweating. ? Shortness of breath. ? Nausea or vomiting. ? Pain, pressure, or a strange feeling in the back, neck, jaw, or upper belly or in one or both shoulders or arms. ? Lightheadedness or sudden weakness. ? A fast or irregular heartbeat.     · You have symptoms of a stroke. These may include:  ? Sudden numbness, tingling, weakness, or loss of movement in your face, arm, or leg, especially on only one side of your body. ? Sudden vision changes. ? Sudden trouble speaking. ? Sudden confusion or trouble understanding simple statements. ? Sudden problems with walking or balance. ? A sudden, severe headache that is different from past headaches.     · You have severe back or belly pain.    Do not wait until your blood pressure comes down on its own.  Get help right away.   Call your doctor now or seek immediate care if:    · Your blood pressure is much higher than normal (such as 180/120 or higher), but you don't have symptoms.     · You think high blood pressure is causing symptoms, such as:  ? Severe headache.  ? Blurry vision.    Watch closely for changes in your health, and be sure to contact your doctor if:    · Your blood pressure measures higher than your doctor recommends at least 2 times. That means the top number is higher or the bottom number is higher, or both.     · You think you may be having side effects from your blood pressure medicine. Where can you learn more? Go to http://carlene-abhay.info/. Enter C071 in the search box to learn more about \"High Blood Pressure: Care Instructions. \"  Current as of: July 22, 2018  Content Version: 11.9  © 9556-0792 [employer]. Care instructions adapted under license by [employer] (which disclaims liability or warranty for this information). If you have questions about a medical condition or this instruction, always ask your healthcare professional. [address] any warranty or liability for your use of this information.

## 2020-11-18 ENCOUNTER — TRANSCRIBE ORDER (OUTPATIENT)
Dept: SCHEDULING | Age: 80
End: 2020-11-18

## 2020-11-18 DIAGNOSIS — M81.0 OSTEOPOROSIS: Primary | ICD-10-CM

## 2021-07-01 ENCOUNTER — HOSPITAL ENCOUNTER (INPATIENT)
Age: 81
LOS: 6 days | Discharge: NURSING, REHAB OR LONG-TERM CARE | DRG: 522 | End: 2021-07-07
Attending: EMERGENCY MEDICINE | Admitting: INTERNAL MEDICINE
Payer: MEDICARE

## 2021-07-01 ENCOUNTER — APPOINTMENT (OUTPATIENT)
Dept: GENERAL RADIOLOGY | Age: 81
DRG: 522 | End: 2021-07-01
Attending: EMERGENCY MEDICINE
Payer: MEDICARE

## 2021-07-01 ENCOUNTER — APPOINTMENT (OUTPATIENT)
Dept: CT IMAGING | Age: 81
DRG: 522 | End: 2021-07-01
Attending: EMERGENCY MEDICINE
Payer: MEDICARE

## 2021-07-01 DIAGNOSIS — S72.002A CLOSED FRACTURE OF LEFT HIP, INITIAL ENCOUNTER (HCC): Primary | ICD-10-CM

## 2021-07-01 DIAGNOSIS — D72.829 LEUKOCYTOSIS, UNSPECIFIED TYPE: ICD-10-CM

## 2021-07-01 DIAGNOSIS — R19.7 DIARRHEA, UNSPECIFIED TYPE: ICD-10-CM

## 2021-07-01 PROBLEM — S72.009A FEMORAL NECK FRACTURE (HCC): Status: ACTIVE | Noted: 2021-07-01

## 2021-07-01 LAB
ABO + RH BLD: NORMAL
ALBUMIN SERPL-MCNC: 3 G/DL (ref 3.5–5)
ALBUMIN/GLOB SERPL: 0.9 {RATIO} (ref 1.1–2.2)
ALP SERPL-CCNC: 115 U/L (ref 45–117)
ALT SERPL-CCNC: 23 U/L (ref 12–78)
ANION GAP SERPL CALC-SCNC: 5 MMOL/L (ref 5–15)
APPEARANCE UR: ABNORMAL
AST SERPL-CCNC: 28 U/L (ref 15–37)
ATRIAL RATE: 58 BPM
BACTERIA URNS QL MICRO: ABNORMAL /HPF
BASOPHILS # BLD: 0 K/UL (ref 0–0.1)
BASOPHILS NFR BLD: 0 % (ref 0–1)
BILIRUB SERPL-MCNC: 0.6 MG/DL (ref 0.2–1)
BILIRUB UR QL: NEGATIVE
BLOOD GROUP ANTIBODIES SERPL: NORMAL
BUN SERPL-MCNC: 14 MG/DL (ref 6–20)
BUN/CREAT SERPL: 21 (ref 12–20)
CALCIUM SERPL-MCNC: 8.5 MG/DL (ref 8.5–10.1)
CALCULATED P AXIS, ECG09: 61 DEGREES
CALCULATED R AXIS, ECG10: -35 DEGREES
CALCULATED T AXIS, ECG11: 65 DEGREES
CHLORIDE SERPL-SCNC: 97 MMOL/L (ref 97–108)
CO2 SERPL-SCNC: 33 MMOL/L (ref 21–32)
COLOR UR: ABNORMAL
CREAT SERPL-MCNC: 0.68 MG/DL (ref 0.55–1.02)
DIAGNOSIS, 93000: NORMAL
DIFFERENTIAL METHOD BLD: ABNORMAL
EOSINOPHIL # BLD: 0.2 K/UL (ref 0–0.4)
EOSINOPHIL NFR BLD: 1 % (ref 0–7)
EPITH CASTS URNS QL MICRO: ABNORMAL /LPF
ERYTHROCYTE [DISTWIDTH] IN BLOOD BY AUTOMATED COUNT: 13.7 % (ref 11.5–14.5)
GLOBULIN SER CALC-MCNC: 3.3 G/DL (ref 2–4)
GLUCOSE SERPL-MCNC: 86 MG/DL (ref 65–100)
GLUCOSE UR STRIP.AUTO-MCNC: NEGATIVE MG/DL
HCT VFR BLD AUTO: 41.6 % (ref 35–47)
HGB BLD-MCNC: 13.5 G/DL (ref 11.5–16)
HGB UR QL STRIP: ABNORMAL
IMM GRANULOCYTES # BLD AUTO: 0.2 K/UL (ref 0–0.04)
IMM GRANULOCYTES NFR BLD AUTO: 1 % (ref 0–0.5)
INR PPP: 1.1 (ref 0.9–1.1)
KETONES UR QL STRIP.AUTO: ABNORMAL MG/DL
LACTATE SERPL-SCNC: 0.7 MMOL/L (ref 0.4–2)
LEUKOCYTE ESTERASE UR QL STRIP.AUTO: ABNORMAL
LYMPHOCYTES # BLD: 0.9 K/UL (ref 0.8–3.5)
LYMPHOCYTES NFR BLD: 5 % (ref 12–49)
MAGNESIUM SERPL-MCNC: 1.8 MG/DL (ref 1.6–2.4)
MCH RBC QN AUTO: 31.5 PG (ref 26–34)
MCHC RBC AUTO-ENTMCNC: 32.5 G/DL (ref 30–36.5)
MCV RBC AUTO: 97 FL (ref 80–99)
MONOCYTES # BLD: 1 K/UL (ref 0–1)
MONOCYTES NFR BLD: 5 % (ref 5–13)
NEUTS SEG # BLD: 16.5 K/UL (ref 1.8–8)
NEUTS SEG NFR BLD: 88 % (ref 32–75)
NITRITE UR QL STRIP.AUTO: POSITIVE
NRBC # BLD: 0 K/UL (ref 0–0.01)
NRBC BLD-RTO: 0 PER 100 WBC
P-R INTERVAL, ECG05: 200 MS
PH UR STRIP: 5.5 [PH] (ref 5–8)
PLATELET # BLD AUTO: 301 K/UL (ref 150–400)
PMV BLD AUTO: 9 FL (ref 8.9–12.9)
POTASSIUM SERPL-SCNC: 3.6 MMOL/L (ref 3.5–5.1)
PROT SERPL-MCNC: 6.3 G/DL (ref 6.4–8.2)
PROT UR STRIP-MCNC: NEGATIVE MG/DL
PROTHROMBIN TIME: 11 SEC (ref 9–11.1)
Q-T INTERVAL, ECG07: 494 MS
QRS DURATION, ECG06: 106 MS
QTC CALCULATION (BEZET), ECG08: 484 MS
RBC # BLD AUTO: 4.29 M/UL (ref 3.8–5.2)
RBC #/AREA URNS HPF: ABNORMAL /HPF (ref 0–5)
SODIUM SERPL-SCNC: 135 MMOL/L (ref 136–145)
SP GR UR REFRACTOMETRY: 1.02 (ref 1–1.03)
SPECIMEN EXP DATE BLD: NORMAL
UA: UC IF INDICATED,UAUC: ABNORMAL
UROBILINOGEN UR QL STRIP.AUTO: 1 EU/DL (ref 0.2–1)
VENTRICULAR RATE, ECG03: 58 BPM
WBC # BLD AUTO: 18.7 K/UL (ref 3.6–11)
WBC URNS QL MICRO: ABNORMAL /HPF (ref 0–4)

## 2021-07-01 PROCEDURE — 87040 BLOOD CULTURE FOR BACTERIA: CPT

## 2021-07-01 PROCEDURE — 96365 THER/PROPH/DIAG IV INF INIT: CPT

## 2021-07-01 PROCEDURE — 85610 PROTHROMBIN TIME: CPT

## 2021-07-01 PROCEDURE — 94762 N-INVAS EAR/PLS OXIMTRY CONT: CPT

## 2021-07-01 PROCEDURE — 70450 CT HEAD/BRAIN W/O DYE: CPT

## 2021-07-01 PROCEDURE — 83735 ASSAY OF MAGNESIUM: CPT

## 2021-07-01 PROCEDURE — 65270000029 HC RM PRIVATE

## 2021-07-01 PROCEDURE — 93005 ELECTROCARDIOGRAM TRACING: CPT

## 2021-07-01 PROCEDURE — 51702 INSERT TEMP BLADDER CATH: CPT

## 2021-07-01 PROCEDURE — 36415 COLL VENOUS BLD VENIPUNCTURE: CPT

## 2021-07-01 PROCEDURE — 74011250637 HC RX REV CODE- 250/637: Performed by: INTERNAL MEDICINE

## 2021-07-01 PROCEDURE — 80053 COMPREHEN METABOLIC PANEL: CPT

## 2021-07-01 PROCEDURE — 85025 COMPLETE CBC W/AUTO DIFF WBC: CPT

## 2021-07-01 PROCEDURE — 96361 HYDRATE IV INFUSION ADD-ON: CPT

## 2021-07-01 PROCEDURE — 72125 CT NECK SPINE W/O DYE: CPT

## 2021-07-01 PROCEDURE — 74011250636 HC RX REV CODE- 250/636: Performed by: EMERGENCY MEDICINE

## 2021-07-01 PROCEDURE — 74011250637 HC RX REV CODE- 250/637: Performed by: EMERGENCY MEDICINE

## 2021-07-01 PROCEDURE — 81001 URINALYSIS AUTO W/SCOPE: CPT

## 2021-07-01 PROCEDURE — 99285 EMERGENCY DEPT VISIT HI MDM: CPT

## 2021-07-01 PROCEDURE — 73502 X-RAY EXAM HIP UNI 2-3 VIEWS: CPT

## 2021-07-01 PROCEDURE — 71045 X-RAY EXAM CHEST 1 VIEW: CPT

## 2021-07-01 PROCEDURE — 74011250637 HC RX REV CODE- 250/637: Performed by: PHYSICIAN ASSISTANT

## 2021-07-01 PROCEDURE — 96376 TX/PRO/DX INJ SAME DRUG ADON: CPT

## 2021-07-01 PROCEDURE — 96375 TX/PRO/DX INJ NEW DRUG ADDON: CPT

## 2021-07-01 PROCEDURE — 83605 ASSAY OF LACTIC ACID: CPT

## 2021-07-01 PROCEDURE — 86901 BLOOD TYPING SEROLOGIC RH(D): CPT

## 2021-07-01 PROCEDURE — 74011250636 HC RX REV CODE- 250/636: Performed by: PHYSICIAN ASSISTANT

## 2021-07-01 RX ORDER — ACETAMINOPHEN 325 MG/1
650 TABLET ORAL
Status: DISCONTINUED | OUTPATIENT
Start: 2021-07-01 | End: 2021-07-07 | Stop reason: HOSPADM

## 2021-07-01 RX ORDER — NALOXONE HYDROCHLORIDE 0.4 MG/ML
0.4 INJECTION, SOLUTION INTRAMUSCULAR; INTRAVENOUS; SUBCUTANEOUS AS NEEDED
Status: DISCONTINUED | OUTPATIENT
Start: 2021-07-01 | End: 2021-07-07 | Stop reason: HOSPADM

## 2021-07-01 RX ORDER — PHENYTOIN SODIUM 100 MG/1
200 CAPSULE, EXTENDED RELEASE ORAL 2 TIMES DAILY
Status: DISCONTINUED | OUTPATIENT
Start: 2021-07-01 | End: 2021-07-07 | Stop reason: HOSPADM

## 2021-07-01 RX ORDER — SODIUM CHLORIDE 0.9 % (FLUSH) 0.9 %
5-40 SYRINGE (ML) INJECTION AS NEEDED
Status: DISCONTINUED | OUTPATIENT
Start: 2021-07-01 | End: 2021-07-07 | Stop reason: HOSPADM

## 2021-07-01 RX ORDER — FENTANYL CITRATE 50 UG/ML
25 INJECTION, SOLUTION INTRAMUSCULAR; INTRAVENOUS
Status: COMPLETED | OUTPATIENT
Start: 2021-07-01 | End: 2021-07-01

## 2021-07-01 RX ORDER — ACETAMINOPHEN 325 MG/1
650 TABLET ORAL EVERY 6 HOURS
Status: DISCONTINUED | OUTPATIENT
Start: 2021-07-01 | End: 2021-07-07 | Stop reason: HOSPADM

## 2021-07-01 RX ORDER — SODIUM CHLORIDE 0.9 % (FLUSH) 0.9 %
5-40 SYRINGE (ML) INJECTION EVERY 8 HOURS
Status: DISCONTINUED | OUTPATIENT
Start: 2021-07-01 | End: 2021-07-07 | Stop reason: HOSPADM

## 2021-07-01 RX ORDER — POLYETHYLENE GLYCOL 3350 17 G/17G
17 POWDER, FOR SOLUTION ORAL DAILY PRN
Status: DISCONTINUED | OUTPATIENT
Start: 2021-07-01 | End: 2021-07-07 | Stop reason: HOSPADM

## 2021-07-01 RX ORDER — ATORVASTATIN CALCIUM 20 MG/1
20 TABLET, FILM COATED ORAL DAILY
Status: DISCONTINUED | OUTPATIENT
Start: 2021-07-02 | End: 2021-07-07 | Stop reason: HOSPADM

## 2021-07-01 RX ORDER — FENTANYL CITRATE 50 UG/ML
50 INJECTION, SOLUTION INTRAMUSCULAR; INTRAVENOUS
Status: COMPLETED | OUTPATIENT
Start: 2021-07-01 | End: 2021-07-01

## 2021-07-01 RX ORDER — ONDANSETRON 4 MG/1
4 TABLET, ORALLY DISINTEGRATING ORAL
Status: DISCONTINUED | OUTPATIENT
Start: 2021-07-01 | End: 2021-07-07 | Stop reason: HOSPADM

## 2021-07-01 RX ORDER — ACETAMINOPHEN 500 MG
1000 TABLET ORAL
Status: COMPLETED | OUTPATIENT
Start: 2021-07-01 | End: 2021-07-01

## 2021-07-01 RX ORDER — AMOXICILLIN 250 MG
2 CAPSULE ORAL 2 TIMES DAILY
Status: DISCONTINUED | OUTPATIENT
Start: 2021-07-01 | End: 2021-07-07 | Stop reason: HOSPADM

## 2021-07-01 RX ORDER — LANOLIN ALCOHOL/MO/W.PET/CERES
325 CREAM (GRAM) TOPICAL 2 TIMES DAILY
Status: DISCONTINUED | OUTPATIENT
Start: 2021-07-01 | End: 2021-07-07 | Stop reason: HOSPADM

## 2021-07-01 RX ORDER — METOPROLOL SUCCINATE 50 MG/1
50 TABLET, EXTENDED RELEASE ORAL DAILY
Status: DISCONTINUED | OUTPATIENT
Start: 2021-07-02 | End: 2021-07-07 | Stop reason: HOSPADM

## 2021-07-01 RX ORDER — ACETAMINOPHEN 650 MG/1
650 SUPPOSITORY RECTAL
Status: DISCONTINUED | OUTPATIENT
Start: 2021-07-01 | End: 2021-07-07 | Stop reason: HOSPADM

## 2021-07-01 RX ORDER — ONDANSETRON 2 MG/ML
4 INJECTION INTRAMUSCULAR; INTRAVENOUS
Status: DISCONTINUED | OUTPATIENT
Start: 2021-07-01 | End: 2021-07-07 | Stop reason: HOSPADM

## 2021-07-01 RX ORDER — ENOXAPARIN SODIUM 100 MG/ML
40 INJECTION SUBCUTANEOUS DAILY
Status: DISCONTINUED | OUTPATIENT
Start: 2021-07-02 | End: 2021-07-07 | Stop reason: HOSPADM

## 2021-07-01 RX ORDER — DILTIAZEM HYDROCHLORIDE 240 MG/1
240 CAPSULE, COATED, EXTENDED RELEASE ORAL DAILY
Status: DISCONTINUED | OUTPATIENT
Start: 2021-07-02 | End: 2021-07-07 | Stop reason: HOSPADM

## 2021-07-01 RX ADMIN — ACETAMINOPHEN 650 MG: 325 TABLET ORAL at 15:44

## 2021-07-01 RX ADMIN — Medication 10 ML: at 22:15

## 2021-07-01 RX ADMIN — Medication 10 ML: at 18:41

## 2021-07-01 RX ADMIN — ACETAMINOPHEN 650 MG: 325 TABLET ORAL at 22:14

## 2021-07-01 RX ADMIN — FERROUS SULFATE TAB 325 MG (65 MG ELEMENTAL FE) 325 MG: 325 (65 FE) TAB at 18:40

## 2021-07-01 RX ADMIN — FENTANYL CITRATE 50 MCG: 50 INJECTION, SOLUTION INTRAMUSCULAR; INTRAVENOUS at 13:49

## 2021-07-01 RX ADMIN — VANCOMYCIN HYDROCHLORIDE 1000 MG: 1 INJECTION, POWDER, LYOPHILIZED, FOR SOLUTION INTRAVENOUS at 15:44

## 2021-07-01 RX ADMIN — PHENYTOIN SODIUM 200 MG: 100 CAPSULE ORAL at 18:40

## 2021-07-01 RX ADMIN — FENTANYL CITRATE 25 MCG: 50 INJECTION, SOLUTION INTRAMUSCULAR; INTRAVENOUS at 15:43

## 2021-07-01 RX ADMIN — SODIUM CHLORIDE 1000 ML: 9 INJECTION, SOLUTION INTRAVENOUS at 13:49

## 2021-07-01 RX ADMIN — ACETAMINOPHEN 1000 MG: 500 TABLET ORAL at 13:49

## 2021-07-01 NOTE — ED PROVIDER NOTES
EMERGENCY DEPARTMENT HISTORY AND PHYSICAL EXAM      Date: 7/1/2021  Patient Name: Edward Zamarripa    Please note that this dictation was completed with [email or web address], the computer voice recognition software. Quite often unanticipated grammatical, syntax, homophones, and other interpretive errors are inadvertently transcribed by the computer software. Please disregard these errors. Please excuse any errors that have escaped final proofreading. History of Presenting Illness     Chief Complaint   Patient presents with    Fall     Pt arrived via [facility] for a fall that happened today around 1000. Unwitnessed but heard by family members in nearby rooms. Family described it as a loud fall. Pt received 100mcg of fentanyl en route and is drowsy and mildly confused at this time. Grandson at bedside advises she is usually alert and oriented and was before administration of pain meds. No LOC. History Provided By: Patient     HPI: Edward Zamarripa, [de-identified] y.o. female, with a past medical history significant for asthma, seizure, breast cancer, hypertension presented the emergency department with a ground-level fall, left hip pain. Patient states that she has been having diarrhea and had a fall. Diarrhea has been going on for about 3 days. Reports nonbloody, nonmelenic stool. No recent antibiotics or hospitalization. No associated abdominal pain. She tripped and fell and landed on her left side, she did hit her head, did not lose consciousness. Denies any blood thinner use. Rates her left hip pain is severe, was helped by 100 mcg of fentanyl prehospital.  She is unable to bear weight, unable to move. PCP: Regla Bianchi MD    No current facility-administered medications on file prior to encounter. Current Outpatient Medications on File Prior to Encounter   Medication Sig Dispense Refill    metoprolol succinate (TOPROL-XL) 50 mg XL tablet Take 50 mg by mouth daily.       phenytoin ER (DILANTIN ER) 100 mg ER capsule Take 200 mg by mouth two (2) times a day.  citalopram (CELEXA) 40 mg tablet Take 40 mg by mouth daily.  diltiazem CD (CARTIA XT) 240 mg ER capsule Take 240 mg by mouth daily.  aspirin delayed-release 81 mg tablet Take 1 Tab by mouth daily. 100 Tab 3    ferrous sulfate (IRON) 325 mg (65 mg Iron) tablet Take 65 mg by mouth two (2) times a day.  simvastatin (ZOCOR) 40 mg tablet Take 40 mg by mouth nightly.  [DISCONTINUED] prednisone (DELTASONE) 10 mg tablet 4 tabs daily for 2 days   3 tabs daily for 2 days   2 tabs daily for 2 days   1 tab   daily for 2 days 20 tablet 0    [DISCONTINUED] PROAIR HFA 90 mcg/actuation inhaler       [DISCONTINUED] albuterol (PROVENTIL, VENTOLIN) 90 mcg/Actuation inhaler Take 1-2 Puffs by inhalation every four (4) hours as needed for Wheezing.  17 g 0       Past History     Past Medical History:  Past Medical History:   Diagnosis Date    Arthritis     Bladder disorder     Cancer (HCC)     breast cancer    Gastrointestinal disorder     acid reflux    GERD (gastroesophageal reflux disease)     High cholesterol     Hypertension     Other ill-defined conditions(799.89)     pneumonia    Psychiatric disorder     depression    Seizures (HCC)     Stroke ([address]     Thromboembolus ([address]        Past Surgical History:  Past Surgical History:   Procedure Laterality Date    BREAST SURGERY PROCEDURE UNLISTED      leftt breast lumpectomy    HX CATARACT REMOVAL      bilateral    HX  SECTION      x 3    HX CHOLECYSTECTOMY      HX ORTHOPAEDIC      left arm, left leg, right wrist    HX ORTHOPAEDIC  13    ELBOW OPEN REDUCTION INTERNAL FIXATION (Right    HX OTHER SURGICAL      Brain surgery s/p CVA    HX OTHER SURGICAL      Bladder stimulator 12    HX OTHER SURGICAL      gastric bypass       Family History:  Family History   Problem Relation Age of Onset    Stroke Father        Social History:  Social History     Tobacco Use    Smoking status: Never Smoker    Smokeless tobacco: Never Used   Substance Use Topics    Alcohol use: No    Drug use: No       Allergies: Allergies   Allergen Reactions    Codeine Rash     Doesn't do well with morphine and its derivatives, tolerates tramadol    Penicillins Swelling         Review of Systems   Review of Systems   Constitutional: Negative for chills and fever. HENT: Negative for congestion and sore throat. Eyes: Negative for visual disturbance. Respiratory: Negative for cough and shortness of breath. Cardiovascular: Negative for chest pain and leg swelling. Gastrointestinal: Positive for diarrhea. Negative for abdominal pain, blood in stool, nausea and vomiting. Endocrine: Negative for polyuria. Genitourinary: Negative for dysuria, flank pain, vaginal bleeding and vaginal discharge. Musculoskeletal: Positive for arthralgias and myalgias. Skin: Negative for rash. Allergic/Immunologic: Negative for immunocompromised state. Neurological: Negative for weakness and headaches. Psychiatric/Behavioral: Negative for confusion. Physical Exam   Physical Exam  Vitals and nursing note reviewed. Constitutional:       Appearance: She is well-developed. She is obese. Comments: Unkempt, covered in stool   HENT:      Head: Normocephalic and atraumatic. Eyes:      General:         Right eye: No discharge. Left eye: No discharge. Conjunctiva/sclera: Conjunctivae normal.      Pupils: Pupils are equal, round, and reactive to light. Neck:      Trachea: No tracheal deviation. Cardiovascular:      Rate and Rhythm: Normal rate and regular rhythm. Heart sounds: Normal heart sounds. No murmur heard. Pulmonary:      Effort: Pulmonary effort is normal. No respiratory distress. Breath sounds: Normal breath sounds. No wheezing or rales. Abdominal:      General: Bowel sounds are normal.      Palpations: Abdomen is soft. Tenderness:  There is no abdominal tenderness. There is no guarding or rebound. Comments: Large midline vertical scar   Musculoskeletal:      Cervical back: Normal range of motion and neck supple. Comments: Left leg is shortened, unable to lift against gravity secondary to pain. Pain with leg roll, tenderness to palpation over the left greater trochanter. Skin:     General: Skin is warm and dry. Findings: No erythema or rash. Neurological:      Mental Status: She is alert and oriented to person, place, and time. Psychiatric:         Behavior: Behavior normal.         Diagnostic Study Results     Labs -     Recent Results (from the past 12 hour(s))   EKG, 12 LEAD, INITIAL    Collection Time: 07/01/21  2:20 PM   Result Value Ref Range    Ventricular Rate 58 BPM    Atrial Rate 58 BPM    P-R Interval 200 ms    QRS Duration 106 ms    Q-T Interval 494 ms    QTC Calculation (Bezet) 484 ms    Calculated P Axis 61 degrees    Calculated R Axis -35 degrees    Calculated T Axis 65 degrees    Diagnosis       Sinus bradycardia  Left axis deviation  Minimal voltage criteria for LVH, may be normal variant  When compared with ECG of 23-JAN-2019 13:09,  No significant change was found  Confirmed by Alireza Crenshaw MD, Adam Wild ([id number]) on 7/1/2021 4:28:41 PM     CBC WITH AUTOMATED DIFF    Collection Time: 07/01/21  2:49 PM   Result Value Ref Range    WBC 18.7 (H) 3.6 - 11.0 K/uL    RBC 4.29 3.80 - 5.20 M/uL    HGB 13.5 11.5 - 16.0 g/dL    HCT 41.6 35.0 - 47.0 %    MCV 97.0 80.0 - 99.0 FL    MCH 31.5 26.0 - 34.0 PG    MCHC 32.5 30.0 - 36.5 g/dL    RDW 13.7 11.5 - 14.5 %    PLATELET 378 016 - 311 K/uL    MPV 9.0 8.9 - 12.9 FL    NRBC 0.0 0  WBC    ABSOLUTE NRBC 0.00 0.00 - 0.01 K/uL    NEUTROPHILS 88 (H) 32 - 75 %    LYMPHOCYTES 5 (L) 12 - 49 %    MONOCYTES 5 5 - 13 %    EOSINOPHILS 1 0 - 7 %    BASOPHILS 0 0 - 1 %    IMMATURE GRANULOCYTES 1 (H) 0.0 - 0.5 %    ABS. NEUTROPHILS 16.5 (H) 1.8 - 8.0 K/UL    ABS. LYMPHOCYTES 0.9 0.8 - 3.5 K/UL    ABS. MONOCYTES 1.0 0.0 - 1.0 K/UL    ABS. EOSINOPHILS 0.2 0.0 - 0.4 K/UL    ABS. BASOPHILS 0.0 0.0 - 0.1 K/UL    ABS. IMM. GRANS. 0.2 (H) 0.00 - 0.04 K/UL    DF AUTOMATED     TYPE & SCREEN    Collection Time: 07/01/21  2:49 PM   Result Value Ref Range    Crossmatch Expiration 07/04/2021,2359     ABO/Rh(D) A POSITIVE     Antibody screen NEG    PROTHROMBIN TIME + INR    Collection Time: 07/01/21  2:49 PM   Result Value Ref Range    INR 1.1 0.9 - 1.1      Prothrombin time 11.0 9.0 - 85.4 sec   METABOLIC PANEL, COMPREHENSIVE    Collection Time: 07/01/21  2:49 PM   Result Value Ref Range    Sodium 135 (L) 136 - 145 mmol/L    Potassium 3.6 3.5 - 5.1 mmol/L    Chloride 97 97 - 108 mmol/L    CO2 33 (H) 21 - 32 mmol/L    Anion gap 5 5 - 15 mmol/L    Glucose 86 65 - 100 mg/dL    BUN 14 6 - 20 MG/DL    Creatinine 0.68 0.55 - 1.02 MG/DL    BUN/Creatinine ratio 21 (H) 12 - 20      GFR est AA >60 >60 ml/min/1.73m2    GFR est non-AA >60 >60 ml/min/1.73m2    Calcium 8.5 8.5 - 10.1 MG/DL    Bilirubin, total 0.6 0.2 - 1.0 MG/DL    ALT (SGPT) 23 12 - 78 U/L    AST (SGOT) 28 15 - 37 U/L    Alk.  phosphatase 115 45 - 117 U/L    Protein, total 6.3 (L) 6.4 - 8.2 g/dL    Albumin 3.0 (L) 3.5 - 5.0 g/dL    Globulin 3.3 2.0 - 4.0 g/dL    A-G Ratio 0.9 (L) 1.1 - 2.2     MAGNESIUM    Collection Time: 07/01/21  2:49 PM   Result Value Ref Range    Magnesium 1.8 1.6 - 2.4 mg/dL   URINALYSIS W/ REFLEX CULTURE    Collection Time: 07/01/21  4:28 PM    Specimen: Urine   Result Value Ref Range    Color YELLOW/STRAW      Appearance CLOUDY (A) CLEAR      Specific gravity 1.023 1.003 - 1.030      pH (UA) 5.5 5.0 - 8.0      Protein Negative NEG mg/dL    Glucose Negative NEG mg/dL    Ketone TRACE (A) NEG mg/dL    Bilirubin Negative NEG      Blood TRACE (A) NEG      Urobilinogen 1.0 0.2 - 1.0 EU/dL    Nitrites Positive (A) NEG      Leukocyte Esterase SMALL (A) NEG      WBC 5-10 0 - 4 /hpf    RBC 0-5 0 - 5 /hpf    Epithelial cells FEW FEW /lpf    Bacteria 3+ (A) NEG /hpf    UA:UC IF INDICATED CULTURE NOT INDICATED BY UA RESULT CNI     LACTIC ACID    Collection Time: 07/01/21  5:25 PM   Result Value Ref Range    Lactic acid 0.7 0.4 - 2.0 MMOL/L       Radiologic Studies -   XR HIP LT W OR WO PELV 2-3 VWS   Final Result   Acute left femoral neck fracture      XR CHEST PORT   Final Result   No acute cardiopulmonary process seen. Cardiomegaly. CT HEAD WO CONT   Final Result   No acute process identified. 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 Please correlate   clinically. Mild Central canal stenosis C5-6        CT Results  (Last 48 hours)               07/01/21 1325  CT HEAD WO CONT Final result    Impression:  No acute process identified. 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 CONTRAST: None. TECHNIQUE: Unenhanced CT of the head was performed using 5 mm images. Brain and   bone windows were generated. Coronal and sagittal reformats. CT dose reduction   was achieved through use of a standardized protocol tailored for this   examination and automatic exposure control for dose modulation. FINDINGS:   The ventricles and sulci are stable in size, shape and configuration within   extra-axial phenomena the left lateral ventricle. . There is moderate   periventricular white matter hypodensity. A large left MCA infarct is noted. There is no intracranial hemorrhage, extra-axial collection, or mass effect. The   basilar cisterns are open. No CT evidence of acute infarct. The bone windows demonstrate the left temporal craniotomy is well opposed. . The   visualized portions of the paranasal sinuses and mastoid air cells are clear. 07/01/21 1325  CT SPINE CERV WO CONT Final result    Impression:  No acute fracture   Severe bilateral C5-6 neural foraminal stenosis   Moderate bilateral C6-7 and left C4-5 neural foraminal stenosis   Nonspecific mosaic airspace disease suggesting alveolitis. Please correlate   clinically. Mild Central canal stenosis C5-6       Narrative:  EXAM:  CT CERVICAL SPINE WITHOUT CONTRAST       INDICATION: fall. COMPARISON: None. CONTRAST:  None. TECHNIQUE: Multislice helical CT of the cervical spine was performed without   intravenous contrast administration. Sagittal and coronal reformats were   generated. CT dose reduction was achieved through use of a standardized   protocol tailored for this examination and automatic exposure control for dose   modulation. FINDINGS:        Mosaic groundglass appearance of the lungs is noted bilaterally, asymmetric to   the right. The alignment is within normal limits. There is no fracture or compression   deformity. The odontoid process is intact. The craniocervical junction is within   normal limits. The incidentally imaged soft tissues are within normal limits. C2-C3: There is no spinal canal or neural foraminal stenosis. Left-sided facet   arthropathy is present. C3-C4: There is a disc osteophyte complex. Bilateral facet arthropathy is   asymmetric to the left. Central canal measures 10 mm anterior posterior (axial   image 305). [facility] C4-C5: There is a disc osteophyte complex, asymmetric to the left. Bilateral   facet arthropathy is asymmetric to the left. The central canal measures 13 mm   anterior to posterior. There is minimal right and moderate left neural foraminal   stenosis (axial image 41). [facility] C5-C6: There is a disc osteophyte complex. The central canal measures 9.1 mm   anterior to posterior. Severe bilateral osseous neural foraminal stenosis is   present. (Axial image 46).        C6-C7: There is a disc osteophyte complex. The central canal measures 1 cm   anterior to posterior. There is moderate bilateral neural foraminal stenosis   (axial image 51). .       C7-T1: There is no spinal canal or neural foraminal stenosis. CXR Results  (Last 48 hours)               07/01/21 1408  XR CHEST PORT Final result    Impression:  No acute cardiopulmonary process seen. Cardiomegaly. Narrative:  EXAM: XR CHEST PORT       INDICATION: hip fracture, pre op       COMPARISON: 1/23/2019       FINDINGS: A portable AP radiograph of the chest was obtained at 1355 hours. The   patient is on a cardiac monitor. There is cardiomegaly. Lungs are hyperinflated. A chain of staples is noted overlying the spine. A scoliosis of the thoracic   spine is noted. There is no focal infiltrate. Medical Decision Making   I am the first provider for this patient. I reviewed the vital signs, available nursing notes, past medical history, past surgical history, family history and social history. Vital Signs-Reviewed the patient's vital signs. Patient Vitals for the past 12 hrs:   Temp Pulse Resp BP SpO2   07/01/21 1830  70 20 (!) 190/88 97 %   07/01/21 1745  66 19 (!) 178/72    07/01/21 1512     98 %   07/01/21 1320 98.4 °F (36.9 °C) (!) 56 18 (!) 152/66 (!) 85 %   07/01/21 1315    (!) 147/61 (!) 84 %   07/01/21 1312    (!) 152/66 (!) 82 %       EKG interpretation: (Preliminary)  EKG shows sinus bradycardia, rate 58. Leftward axis. Nonspecific lateral changes, no evidence of ST elevation myocardial infarction. Records Reviewed:   Nursing notes, Prior visits     Provider Notes (Medical Decision Making):   Consistent with likely hip fracture. Given the fall and head injury will obtain CT of the head. Regarding the patient's diarrhea, she has not had any recent antibiotics not been hospitalized, doubt C. difficile as a cause.   Will check labs for preop clearance and concern for electrolyte abnormality given ongoing losses with this diarrhea    ED Course:   Initial assessment performed. The patients presenting problems have been discussed, and they are in agreement with the care plan formulated and outlined with them. I have encouraged them to ask questions as they arise throughout their visit. Critical Care Time:   none    Disposition:  Patient is being admitted to the hospital by Dr. Carmelo Agosto. The results of their tests and reasons for their admission have been discussed with them and/or available family. They convey agreement and understanding for the need to be admitted and for their admission diagnosis. Consultation has been made with the inpatient physician specialist for hospitalization. Diagnosis     Clinical Impression:   1. Closed fracture of left hip, initial encounter ([facility])    2. Leukocytosis, unspecified type    3. Diarrhea, unspecified type        Attestations:   This note was completed by Lesley Gaucher, DO

## 2021-07-01 NOTE — CONSULTS
Left hip fracture transfer from another hospital  Admit to medicine  Npo after midnight  Bedrest    Consent for left hip hemiarthroplasty    Surgery tomorrow late morning if cleared

## 2021-07-01 NOTE — PROGRESS NOTES
Pharmacy Medication Reconciliation     The patient was interviewed regarding current PTA medication list, use and drug allergies;  patient/patient's family (via telephone) present in room and obtained permission from patient to discuss drug regimen with visitor(s) present. The patient was questioned regarding use of any other inhalers, topical products, over the counter medications, herbal medications, vitamin products or ophthalmic/nasal/otic medication use. Allergy Update: Codeine and Penicillins    Recommendations/Findings: The following amendments were made to the patient's active medication list on file at [facility]:   1) Additions: none  2) Deletions:   -ventolin inahler  -prednisone taper    3) Changes: none  Pertinent Findings:   -patient doesn't do well with morphine and opioid painkillers, does tolerate tramadol though per family. Clarified PTA med list with patient interview, patient's daughter, and rx query. PTA medication list was corrected to the following:     Prior to Admission Medications   Prescriptions Last Dose Informant Taking?   aspirin delayed-release 81 mg tablet 6/30/2021 at Unknown time Child Yes   Sig: Take 1 Tab by mouth daily. citalopram (CELEXA) 40 mg tablet 6/30/2021 at Unknown time Child Yes   Sig: Take 40 mg by mouth daily. diltiazem CD (CARTIA XT) 240 mg ER capsule 6/30/2021 at Unknown time Child Yes   Sig: Take 240 mg by mouth daily. ferrous sulfate (IRON) 325 mg (65 mg Iron) tablet 6/30/2021 at Unknown time Child Yes   Sig: Take 65 mg by mouth two (2) times a day. metoprolol succinate (TOPROL-XL) 50 mg XL tablet 6/30/2021 at Unknown time Child Yes   Sig: Take 50 mg by mouth daily. phenytoin ER (DILANTIN ER) 100 mg ER capsule 7/1/2021 at 0900 Child Yes   Sig: Take 200 mg by mouth two (2) times a day. simvastatin (ZOCOR) 40 mg tablet 6/30/2021 at 2100q Child Yes   Sig: Take 40 mg by mouth nightly.       Facility-Administered Medications: None        Thank you,  [facility] Star Freire

## 2021-07-01 NOTE — ED NOTES
Verified with Dr. Tiffany Doran that he still wants the lactic acid drawn as well as blood cultures. OR preop placed an order for vancomycin which was started at [id number] before 1600 order for blood culture and lactic to be drawn.

## 2021-07-01 NOTE — H&P
Hospitalist Admission Note    NAME: Julia Kennedy   :  1940   MRN:  [id number]     Date/Time:  2021 4:09 PM    Patient PCP: Henok Garland MD  ________________________________________________________________________    My assessment of this patient's clinical condition and my plan of care is as follows. Assessment / Plan:  Ground-level fall andAcute left femoral neck fracture POA  Admit patient to orthopedic floor  Ortho  on board  N.p.o. after midnight for possible procedure tomorrow  Pain medication  Perioperative care  And has intermediate cardiac risk for intermediate procedures like the one planned  Patient has diarrhea over the last few days  Leukocytosis  Could be reactive sending cultures and lactic acid preop. Follow-up stool studies  less likely to be C. difficile. Patient has not been on antibiotics. Could be acute gastroenteritis. Vancomycin started in Er   Stool softer that she was on held   Hypertension continue home medications  Chronic atrial fibrillation not on anticoagulation seen in chart. Continue  rate control medications  History of seizure continue with seizure medication  History of gout no pain at this point  Body mass index is 38.97 kg/m².: 30.0 - 39.9 Obese  Code Status: FULL as per patient. Surrogate Decision Maker:patient daughter , no answer calling her today. Patient was DNR in her last admissions. grand son was at hospital today as per er staff , no number in chart. Lives with family. Lovenox  for DVT prophylaxis to be hold for tomorrow procedure in the morning. Subjective:   CHIEF COMPLAINT: Fall    HISTORY OF PRESENT ILLNESS:     Julia Kennedy is a [de-identified] y.o.  female who presents with fall and left-sided hip pain. Patient said that she has been having diarrhea over the last 1 week. Sometimes feels lightheaded. Patient suspects probably she fall down because she feels weak from the daughter. Patient's not a good historian. Tried to call daughter for no answer. Otherwise patient denied any chest pain or shortness of breath at this point. Patient denied any urinary habit changes. We were asked to admit for work up and evaluation of the above problems. Past Medical History:   Diagnosis Date    Arthritis     Bladder disorder     Cancer [facility])     breast cancer    Gastrointestinal disorder     acid reflux    GERD (gastroesophageal reflux disease)     High cholesterol     Hypertension     Other ill-defined conditions(799.89)     pneumonia    Psychiatric disorder     depression    Seizures (HCC)     Stroke ([facility] [address]     Thromboembolus ([facility] [address]         Past Surgical History:   Procedure Laterality Date    BREAST SURGERY PROCEDURE UNLISTED      leftt breast lumpectomy    HX CATARACT REMOVAL      bilateral    HX  SECTION      x 3    HX CHOLECYSTECTOMY      HX ORTHOPAEDIC      left arm, left leg, right wrist    HX ORTHOPAEDIC  13    ELBOW OPEN REDUCTION INTERNAL FIXATION (Right    HX OTHER SURGICAL      Brain surgery s/p CVA    HX OTHER SURGICAL      Bladder stimulator 12    HX OTHER SURGICAL      gastric bypass       Social History     Tobacco Use    Smoking status: Never Smoker    Smokeless tobacco: Never Used   Substance Use Topics    Alcohol use: No        Family History   Problem Relation Age of Onset    Stroke Father      Allergies   Allergen Reactions    Codeine Rash     Doesn't do well with morphine and its derivatives, tolerates tramadol    Penicillins Swelling        Prior to Admission medications    Medication Sig Start Date End Date Taking? Authorizing Provider   metoprolol succinate (TOPROL-XL) 50 mg XL tablet Take 50 mg by mouth daily. 14  Yes Other, MD Lars   phenytoin ER (DILANTIN ER) 100 mg ER capsule Take 200 mg by mouth two (2) times a day. 14  Yes Other, MD Lars   citalopram (CELEXA) 40 mg tablet Take 40 mg by mouth daily.    Yes Provider, Historical   diltiazem CD (CARTIA XT) 240 mg ER capsule Take 240 mg by mouth daily. Yes Silas, MD Lars   aspirin delayed-release 81 mg tablet Take 1 Tab by mouth daily. 4/15/11  Yes Nabil Downs MD   ferrous sulfate (IRON) 325 mg (65 mg Iron) tablet Take 65 mg by mouth two (2) times a day. 9/27/10  Yes Lars Rosen MD   simvastatin (ZOCOR) 40 mg tablet Take 40 mg by mouth nightly. Yes Silas, MD Lars       REVIEW OF SYSTEMS:     I am not able to complete the review of systems because:    The patient is intubated and sedated    The patient has altered mental status due to his acute medical problems    The patient has baseline aphasia from prior stroke(s)    The patient has baseline dementia and is not reliable historian    The patient is in acute medical distress and unable to provide information           Total of 12 systems reviewed as follows:       POSITIVE= underlined text  Negative = text not underlined  General:  fever, chills, sweats, generalized weakness, weight loss/gain,      loss of appetite   Eyes:    blurred vision, eye pain, loss of vision, double vision  ENT:    rhinorrhea, pharyngitis   Respiratory:   cough, sputum production, SOB, STEIN, wheezing, pleuritic pain   Cardiology:   chest pain, palpitations, orthopnea, PND, edema, syncope   Gastrointestinal:  abdominal pain , N/V, diarrhea, dysphagia, constipation, bleeding   Genitourinary:  frequency, urgency, dysuria, hematuria, incontinence   Muskuloskeletal :  arthralgia, myalgia, back pain  Hematology:  easy bruising, nose or gum bleeding, lymphadenopathy   Dermatological: rash, ulceration, pruritis, color change / jaundice  Endocrine:   hot flashes or polydipsia   Neurological:  headache, dizziness, confusion, focal weakness, paresthesia,     Speech difficulties, memory loss, gait difficulty  Psychological: Feelings of anxiety, depression, agitation    Objective:   VITALS:    Visit Vitals  BP (!) 152/66 (BP 1 Location: Left upper arm)   Pulse (!) 56   Temp 98.4 °F (36.9 °C)   Resp 18   Ht 5' 3\" (1.6 m)   Wt 99.8 kg (220 lb)   SpO2 98%   BMI 38.97 kg/m²       PHYSICAL EXAM:    General:    Alert, cooperative, no distress, appears stated age. HEENT: Atraumatic, anicteric sclerae, pink conjunctivae     No oral ulcers, mucosa moist, throat clear, dentition fair  Neck:  Supple, symmetrical,  thyroid: non tender  Lungs:   Clear to auscultation bilaterally. No Wheezing or Rhonchi. No rales. Chest wall:  No tenderness  No Accessory muscle use. Heart:   Regular  rhythm,  No  murmur   No edema  Abdomen:   Soft, non-tender. Not distended. Bowel sounds normal  Extremities: Left lower extremity with limitation of movement and external rotation and shortening noted. Skin:     Not pale. Not Jaundiced  No rashes   Psych:  Good insight. Not depressed. Not anxious or agitated. Neurologic: EOMs intact. No facial asymmetry. No aphasia or slurred speech. Symmetrical strength, Sensation grossly intact. Alert and oriented X 4.     _______________________________________________________________________  Care Plan discussed with:    Comments   Patient     Family      RN     Care Manager                    Consultant:      _______________________________________________________________________  Expected  Disposition:   Home with Family    HH/PT/OT/RN    SNF/LTC    ALEIDA    ________________________________________________________________________  TOTAL TIME:  72 Minutes    Critical Care Provided     Minutes non procedure based      Comments    7 Reviewed previous records   >50% of visit spent in counseling and coordination of care  Discussion with patient and/or family and questions answered       ________________________________________________________________________  Signed: Pretty Edgar MD    Procedures: see electronic medical records for all procedures/Xrays and details which were not copied into this note but were reviewed prior to creation of Plan.     LAB DATA REVIEWED: Recent Results (from the past 24 hour(s))   EKG, 12 LEAD, INITIAL    Collection Time: 07/01/21  2:20 PM   Result Value Ref Range    Ventricular Rate 58 BPM    Atrial Rate 58 BPM    P-R Interval 200 ms    QRS Duration 106 ms    Q-T Interval 494 ms    QTC Calculation (Bezet) 484 ms    Calculated P Axis 61 degrees    Calculated R Axis -35 degrees    Calculated T Axis 65 degrees    Diagnosis       Sinus bradycardia  Left axis deviation  Minimal voltage criteria for LVH, may be normal variant  When compared with ECG of 23-JAN-2019 13:09,  No significant change was found     CBC WITH AUTOMATED DIFF    Collection Time: 07/01/21  2:49 PM   Result Value Ref Range    WBC 18.7 (H) 3.6 - 11.0 K/uL    RBC 4.29 3.80 - 5.20 M/uL    HGB 13.5 11.5 - 16.0 g/dL    HCT 41.6 35.0 - 47.0 %    MCV 97.0 80.0 - 99.0 FL    MCH 31.5 26.0 - 34.0 PG    MCHC 32.5 30.0 - 36.5 g/dL    RDW 13.7 11.5 - 14.5 %    PLATELET 713 488 - 627 K/uL    MPV 9.0 8.9 - 12.9 FL    NRBC 0.0 0  WBC    ABSOLUTE NRBC 0.00 0.00 - 0.01 K/uL    NEUTROPHILS 88 (H) 32 - 75 %    LYMPHOCYTES 5 (L) 12 - 49 %    MONOCYTES 5 5 - 13 %    EOSINOPHILS 1 0 - 7 %    BASOPHILS 0 0 - 1 %    IMMATURE GRANULOCYTES 1 (H) 0.0 - 0.5 %    ABS. NEUTROPHILS 16.5 (H) 1.8 - 8.0 K/UL    ABS. LYMPHOCYTES 0.9 0.8 - 3.5 K/UL    ABS. MONOCYTES 1.0 0.0 - 1.0 K/UL    ABS. EOSINOPHILS 0.2 0.0 - 0.4 K/UL    ABS. BASOPHILS 0.0 0.0 - 0.1 K/UL    ABS. IMM.  GRANS. 0.2 (H) 0.00 - 0.04 K/UL    DF AUTOMATED     TYPE & SCREEN    Collection Time: 07/01/21  2:49 PM   Result Value Ref Range    Crossmatch Expiration 07/04/2021,2359     ABO/Rh(D) A POSITIVE     Antibody screen NEG    PROTHROMBIN TIME + INR    Collection Time: 07/01/21  2:49 PM   Result Value Ref Range    INR 1.1 0.9 - 1.1      Prothrombin time 11.0 9.0 - 34.5 sec   METABOLIC PANEL, COMPREHENSIVE    Collection Time: 07/01/21  2:49 PM   Result Value Ref Range    Sodium 135 (L) 136 - 145 mmol/L    Potassium 3.6 3.5 - 5.1 mmol/L    Chloride 97 97 - 108 mmol/L    CO2 33 (H) 21 - 32 mmol/L    Anion gap 5 5 - 15 mmol/L    Glucose 86 65 - 100 mg/dL    BUN 14 6 - 20 MG/DL    Creatinine 0.68 0.55 - 1.02 MG/DL    BUN/Creatinine ratio 21 (H) 12 - 20      GFR est AA >60 >60 ml/min/1.73m2    GFR est non-AA >60 >60 ml/min/1.73m2    Calcium 8.5 8.5 - 10.1 MG/DL    Bilirubin, total 0.6 0.2 - 1.0 MG/DL    ALT (SGPT) 23 12 - 78 U/L    AST (SGOT) 28 15 - 37 U/L    Alk.  phosphatase 115 45 - 117 U/L    Protein, total 6.3 (L) 6.4 - 8.2 g/dL    Albumin 3.0 (L) 3.5 - 5.0 g/dL    Globulin 3.3 2.0 - 4.0 g/dL    A-G Ratio 0.9 (L) 1.1 - 2.2     MAGNESIUM    Collection Time: 07/01/21  2:49 PM   Result Value Ref Range    Magnesium 1.8 1.6 - 2.4 mg/dL

## 2021-07-02 ENCOUNTER — APPOINTMENT (OUTPATIENT)
Dept: GENERAL RADIOLOGY | Age: 81
DRG: 522 | End: 2021-07-02
Attending: ORTHOPAEDIC SURGERY
Payer: MEDICARE

## 2021-07-02 ENCOUNTER — ANESTHESIA (OUTPATIENT)
Dept: SURGERY | Age: 81
DRG: 522 | End: 2021-07-02
Payer: MEDICARE

## 2021-07-02 ENCOUNTER — ANESTHESIA EVENT (OUTPATIENT)
Dept: SURGERY | Age: 81
DRG: 522 | End: 2021-07-02
Payer: MEDICARE

## 2021-07-02 LAB
ALBUMIN SERPL-MCNC: 3.3 G/DL (ref 3.5–5)
ALBUMIN/GLOB SERPL: 0.9 {RATIO} (ref 1.1–2.2)
ALP SERPL-CCNC: 123 U/L (ref 45–117)
ALT SERPL-CCNC: 22 U/L (ref 12–78)
ANION GAP SERPL CALC-SCNC: 5 MMOL/L (ref 5–15)
AST SERPL-CCNC: 23 U/L (ref 15–37)
BASOPHILS # BLD: 0 K/UL (ref 0–0.1)
BASOPHILS NFR BLD: 0 % (ref 0–1)
BILIRUB SERPL-MCNC: 0.6 MG/DL (ref 0.2–1)
BUN SERPL-MCNC: 13 MG/DL (ref 6–20)
BUN/CREAT SERPL: 21 (ref 12–20)
CALCIUM SERPL-MCNC: 9.6 MG/DL (ref 8.5–10.1)
CHLORIDE SERPL-SCNC: 96 MMOL/L (ref 97–108)
CO2 SERPL-SCNC: 30 MMOL/L (ref 21–32)
CREAT SERPL-MCNC: 0.61 MG/DL (ref 0.55–1.02)
DIFFERENTIAL METHOD BLD: ABNORMAL
EOSINOPHIL # BLD: 0.2 K/UL (ref 0–0.4)
EOSINOPHIL NFR BLD: 1 % (ref 0–7)
ERYTHROCYTE [DISTWIDTH] IN BLOOD BY AUTOMATED COUNT: 13.7 % (ref 11.5–14.5)
GLOBULIN SER CALC-MCNC: 3.7 G/DL (ref 2–4)
GLUCOSE SERPL-MCNC: 97 MG/DL (ref 65–100)
HCT VFR BLD AUTO: 43.7 % (ref 35–47)
HGB BLD-MCNC: 14.5 G/DL (ref 11.5–16)
IMM GRANULOCYTES # BLD AUTO: 0.1 K/UL (ref 0–0.04)
IMM GRANULOCYTES NFR BLD AUTO: 0 % (ref 0–0.5)
LYMPHOCYTES # BLD: 0.8 K/UL (ref 0.8–3.5)
LYMPHOCYTES NFR BLD: 5 % (ref 12–49)
MCH RBC QN AUTO: 32.2 PG (ref 26–34)
MCHC RBC AUTO-ENTMCNC: 33.2 G/DL (ref 30–36.5)
MCV RBC AUTO: 97.1 FL (ref 80–99)
MONOCYTES # BLD: 1 K/UL (ref 0–1)
MONOCYTES NFR BLD: 6 % (ref 5–13)
NEUTS SEG # BLD: 14.1 K/UL (ref 1.8–8)
NEUTS SEG NFR BLD: 88 % (ref 32–75)
NRBC # BLD: 0 K/UL (ref 0–0.01)
NRBC BLD-RTO: 0 PER 100 WBC
PLATELET # BLD AUTO: 268 K/UL (ref 150–400)
PMV BLD AUTO: 9 FL (ref 8.9–12.9)
POTASSIUM SERPL-SCNC: 3.6 MMOL/L (ref 3.5–5.1)
PROT SERPL-MCNC: 7 G/DL (ref 6.4–8.2)
RBC # BLD AUTO: 4.5 M/UL (ref 3.8–5.2)
SODIUM SERPL-SCNC: 131 MMOL/L (ref 136–145)
WBC # BLD AUTO: 16.1 K/UL (ref 3.6–11)

## 2021-07-02 PROCEDURE — C1776 JOINT DEVICE (IMPLANTABLE): HCPCS | Performed by: ORTHOPAEDIC SURGERY

## 2021-07-02 PROCEDURE — 72170 X-RAY EXAM OF PELVIS: CPT

## 2021-07-02 PROCEDURE — 74011250637 HC RX REV CODE- 250/637: Performed by: INTERNAL MEDICINE

## 2021-07-02 PROCEDURE — 74011250636 HC RX REV CODE- 250/636: Performed by: ORTHOPAEDIC SURGERY

## 2021-07-02 PROCEDURE — 94760 N-INVAS EAR/PLS OXIMETRY 1: CPT

## 2021-07-02 PROCEDURE — 77030019908 HC STETH ESOPH SIMS -A: Performed by: ANESTHESIOLOGY

## 2021-07-02 PROCEDURE — 77030018547 HC SUT ETHBND1 J&J -B: Performed by: ORTHOPAEDIC SURGERY

## 2021-07-02 PROCEDURE — 77030035236 HC SUT PDS STRATFX BARB J&J -B: Performed by: ORTHOPAEDIC SURGERY

## 2021-07-02 PROCEDURE — 77030006812 HC BLD SAW RECIP STRY -B: Performed by: ORTHOPAEDIC SURGERY

## 2021-07-02 PROCEDURE — 77030013079 HC BLNKT BAIR HGGR 3M -A: Performed by: ANESTHESIOLOGY

## 2021-07-02 PROCEDURE — 77010033678 HC OXYGEN DAILY

## 2021-07-02 PROCEDURE — 74011000250 HC RX REV CODE- 250: Performed by: NURSE ANESTHETIST, CERTIFIED REGISTERED

## 2021-07-02 PROCEDURE — 74011000258 HC RX REV CODE- 258: Performed by: ORTHOPAEDIC SURGERY

## 2021-07-02 PROCEDURE — 74011250636 HC RX REV CODE- 250/636: Performed by: NURSE ANESTHETIST, CERTIFIED REGISTERED

## 2021-07-02 PROCEDURE — 85025 COMPLETE CBC W/AUTO DIFF WBC: CPT

## 2021-07-02 PROCEDURE — 76000 FLUOROSCOPY <1 HR PHYS/QHP: CPT

## 2021-07-02 PROCEDURE — 74011250637 HC RX REV CODE- 250/637: Performed by: PHYSICIAN ASSISTANT

## 2021-07-02 PROCEDURE — 74011000250 HC RX REV CODE- 250: Performed by: ORTHOPAEDIC SURGERY

## 2021-07-02 PROCEDURE — 2709999900 HC NON-CHARGEABLE SUPPLY: Performed by: ORTHOPAEDIC SURGERY

## 2021-07-02 PROCEDURE — 65660000000 HC RM CCU STEPDOWN

## 2021-07-02 PROCEDURE — 77030008462 HC STPLR SKN PROX J&J -A: Performed by: ORTHOPAEDIC SURGERY

## 2021-07-02 PROCEDURE — 77030008684 HC TU ET CUF COVD -B: Performed by: ANESTHESIOLOGY

## 2021-07-02 PROCEDURE — 76060000034 HC ANESTHESIA 1.5 TO 2 HR: Performed by: ORTHOPAEDIC SURGERY

## 2021-07-02 PROCEDURE — 76210000000 HC OR PH I REC 2 TO 2.5 HR: Performed by: ORTHOPAEDIC SURGERY

## 2021-07-02 PROCEDURE — 77030010507 HC ADH SKN DERMBND J&J -B: Performed by: ORTHOPAEDIC SURGERY

## 2021-07-02 PROCEDURE — 74011250636 HC RX REV CODE- 250/636: Performed by: INTERNAL MEDICINE

## 2021-07-02 PROCEDURE — 74011250637 HC RX REV CODE- 250/637: Performed by: ORTHOPAEDIC SURGERY

## 2021-07-02 PROCEDURE — 80053 COMPREHEN METABOLIC PANEL: CPT

## 2021-07-02 PROCEDURE — 77030026438 HC STYL ET INTUB CARD -A: Performed by: ANESTHESIOLOGY

## 2021-07-02 PROCEDURE — 0SRS0JZ REPLACEMENT OF LEFT HIP JOINT, FEMORAL SURFACE WITH SYNTHETIC SUBSTITUTE, OPEN APPROACH: ICD-10-PCS | Performed by: ORTHOPAEDIC SURGERY

## 2021-07-02 PROCEDURE — 74011250636 HC RX REV CODE- 250/636: Performed by: NURSE PRACTITIONER

## 2021-07-02 PROCEDURE — 36415 COLL VENOUS BLD VENIPUNCTURE: CPT

## 2021-07-02 PROCEDURE — 73501 X-RAY EXAM HIP UNI 1 VIEW: CPT

## 2021-07-02 PROCEDURE — 77030031139 HC SUT VCRL2 J&J -A: Performed by: ORTHOPAEDIC SURGERY

## 2021-07-02 PROCEDURE — 76010000153 HC OR TIME 1.5 TO 2 HR: Performed by: ORTHOPAEDIC SURGERY

## 2021-07-02 PROCEDURE — 76010000162 HC OR TIME 1.5 TO 2 HR INTENSV-TIER 1: Performed by: ORTHOPAEDIC SURGERY

## 2021-07-02 PROCEDURE — 77030020788: Performed by: ORTHOPAEDIC SURGERY

## 2021-07-02 PROCEDURE — 77030011264 HC ELECTRD BLD EXT COVD -A: Performed by: ORTHOPAEDIC SURGERY

## 2021-07-02 PROCEDURE — 77030038692 HC WND DEB SYS IRMX -B: Performed by: ORTHOPAEDIC SURGERY

## 2021-07-02 DEVICE — HEAD FEM SLF-CENTER 44X28 GRY --: Type: IMPLANTABLE DEVICE | Site: HIP | Status: FUNCTIONAL

## 2021-07-02 DEVICE — ARTICUL/EZE FEMORAL HEAD DIAMETER 28MM +1.5 12/14 TAPER
Type: IMPLANTABLE DEVICE | Site: HIP | Status: FUNCTIONAL
Brand: ARTICUL/EZE

## 2021-07-02 DEVICE — IMPLANTABLE DEVICE: Type: IMPLANTABLE DEVICE | Site: HIP | Status: FUNCTIONAL

## 2021-07-02 RX ORDER — SODIUM CHLORIDE 0.9 % (FLUSH) 0.9 %
5-40 SYRINGE (ML) INJECTION AS NEEDED
Status: DISCONTINUED | OUTPATIENT
Start: 2021-07-02 | End: 2021-07-07 | Stop reason: HOSPADM

## 2021-07-02 RX ORDER — MIDAZOLAM HYDROCHLORIDE 1 MG/ML
0.5 INJECTION, SOLUTION INTRAMUSCULAR; INTRAVENOUS
Status: DISCONTINUED | OUTPATIENT
Start: 2021-07-02 | End: 2021-07-02 | Stop reason: HOSPADM

## 2021-07-02 RX ORDER — ONDANSETRON 2 MG/ML
4 INJECTION INTRAMUSCULAR; INTRAVENOUS AS NEEDED
Status: DISCONTINUED | OUTPATIENT
Start: 2021-07-02 | End: 2021-07-02 | Stop reason: HOSPADM

## 2021-07-02 RX ORDER — DEXAMETHASONE SODIUM PHOSPHATE 100 MG/10ML
INJECTION INTRAMUSCULAR; INTRAVENOUS AS NEEDED
Status: DISCONTINUED | OUTPATIENT
Start: 2021-07-02 | End: 2021-07-02 | Stop reason: HOSPADM

## 2021-07-02 RX ORDER — MIDAZOLAM HYDROCHLORIDE 1 MG/ML
INJECTION, SOLUTION INTRAMUSCULAR; INTRAVENOUS AS NEEDED
Status: DISCONTINUED | OUTPATIENT
Start: 2021-07-02 | End: 2021-07-02 | Stop reason: HOSPADM

## 2021-07-02 RX ORDER — SODIUM CHLORIDE 0.9 % (FLUSH) 0.9 %
5-40 SYRINGE (ML) INJECTION EVERY 8 HOURS
Status: DISCONTINUED | OUTPATIENT
Start: 2021-07-02 | End: 2021-07-02 | Stop reason: HOSPADM

## 2021-07-02 RX ORDER — TRAMADOL HYDROCHLORIDE 50 MG/1
50 TABLET ORAL
Status: DISCONTINUED | OUTPATIENT
Start: 2021-07-02 | End: 2021-07-07 | Stop reason: HOSPADM

## 2021-07-02 RX ORDER — FENTANYL CITRATE 50 UG/ML
50 INJECTION, SOLUTION INTRAMUSCULAR; INTRAVENOUS AS NEEDED
Status: DISCONTINUED | OUTPATIENT
Start: 2021-07-02 | End: 2021-07-07 | Stop reason: HOSPADM

## 2021-07-02 RX ORDER — NALOXONE HYDROCHLORIDE 0.4 MG/ML
0.4 INJECTION, SOLUTION INTRAMUSCULAR; INTRAVENOUS; SUBCUTANEOUS AS NEEDED
Status: DISCONTINUED | OUTPATIENT
Start: 2021-07-02 | End: 2021-07-07 | Stop reason: HOSPADM

## 2021-07-02 RX ORDER — CEFAZOLIN SODIUM 1 G/3ML
INJECTION, POWDER, FOR SOLUTION INTRAMUSCULAR; INTRAVENOUS AS NEEDED
Status: DISCONTINUED | OUTPATIENT
Start: 2021-07-02 | End: 2021-07-02 | Stop reason: HOSPADM

## 2021-07-02 RX ORDER — HYDROMORPHONE HYDROCHLORIDE 1 MG/ML
0.5 INJECTION, SOLUTION INTRAMUSCULAR; INTRAVENOUS; SUBCUTANEOUS
Status: ACTIVE | OUTPATIENT
Start: 2021-07-02 | End: 2021-07-03

## 2021-07-02 RX ORDER — SODIUM CHLORIDE 9 MG/ML
25 INJECTION, SOLUTION INTRAVENOUS CONTINUOUS
Status: DISPENSED | OUTPATIENT
Start: 2021-07-02 | End: 2021-07-03

## 2021-07-02 RX ORDER — SODIUM CHLORIDE 0.9 % (FLUSH) 0.9 %
5-40 SYRINGE (ML) INJECTION AS NEEDED
Status: DISCONTINUED | OUTPATIENT
Start: 2021-07-02 | End: 2021-07-02 | Stop reason: HOSPADM

## 2021-07-02 RX ORDER — LIDOCAINE HYDROCHLORIDE 10 MG/ML
0.1 INJECTION, SOLUTION EPIDURAL; INFILTRATION; INTRACAUDAL; PERINEURAL AS NEEDED
Status: DISCONTINUED | OUTPATIENT
Start: 2021-07-02 | End: 2021-07-07 | Stop reason: HOSPADM

## 2021-07-02 RX ORDER — HYDROXYZINE HYDROCHLORIDE 10 MG/1
10 TABLET, FILM COATED ORAL
Status: DISCONTINUED | OUTPATIENT
Start: 2021-07-02 | End: 2021-07-07 | Stop reason: HOSPADM

## 2021-07-02 RX ORDER — KETOROLAC TROMETHAMINE 30 MG/ML
15 INJECTION, SOLUTION INTRAMUSCULAR; INTRAVENOUS EVERY 6 HOURS
Status: COMPLETED | OUTPATIENT
Start: 2021-07-02 | End: 2021-07-03

## 2021-07-02 RX ORDER — DIPHENHYDRAMINE HYDROCHLORIDE 50 MG/ML
12.5 INJECTION, SOLUTION INTRAMUSCULAR; INTRAVENOUS AS NEEDED
Status: DISCONTINUED | OUTPATIENT
Start: 2021-07-02 | End: 2021-07-02 | Stop reason: HOSPADM

## 2021-07-02 RX ORDER — FACIAL-BODY WIPES
10 EACH TOPICAL DAILY PRN
Status: DISCONTINUED | OUTPATIENT
Start: 2021-07-04 | End: 2021-07-07 | Stop reason: HOSPADM

## 2021-07-02 RX ORDER — MORPHINE SULFATE 2 MG/ML
1 INJECTION, SOLUTION INTRAMUSCULAR; INTRAVENOUS
Status: DISCONTINUED | OUTPATIENT
Start: 2021-07-02 | End: 2021-07-07 | Stop reason: HOSPADM

## 2021-07-02 RX ORDER — DEXMEDETOMIDINE HYDROCHLORIDE 100 UG/ML
INJECTION, SOLUTION INTRAVENOUS AS NEEDED
Status: DISCONTINUED | OUTPATIENT
Start: 2021-07-02 | End: 2021-07-02 | Stop reason: HOSPADM

## 2021-07-02 RX ORDER — MIDAZOLAM HYDROCHLORIDE 1 MG/ML
1 INJECTION, SOLUTION INTRAMUSCULAR; INTRAVENOUS AS NEEDED
Status: DISCONTINUED | OUTPATIENT
Start: 2021-07-02 | End: 2021-07-07 | Stop reason: HOSPADM

## 2021-07-02 RX ORDER — SODIUM CHLORIDE 0.9 % (FLUSH) 0.9 %
5-40 SYRINGE (ML) INJECTION EVERY 8 HOURS
Status: DISCONTINUED | OUTPATIENT
Start: 2021-07-02 | End: 2021-07-07 | Stop reason: HOSPADM

## 2021-07-02 RX ORDER — SODIUM CHLORIDE, SODIUM LACTATE, POTASSIUM CHLORIDE, CALCIUM CHLORIDE 600; 310; 30; 20 MG/100ML; MG/100ML; MG/100ML; MG/100ML
25 INJECTION, SOLUTION INTRAVENOUS CONTINUOUS
Status: DISPENSED | OUTPATIENT
Start: 2021-07-02 | End: 2021-07-03

## 2021-07-02 RX ORDER — FERROUS SULFATE, DRIED 160(50) MG
1 TABLET, EXTENDED RELEASE ORAL
Status: DISCONTINUED | OUTPATIENT
Start: 2021-07-03 | End: 2021-07-07 | Stop reason: HOSPADM

## 2021-07-02 RX ORDER — CITALOPRAM 20 MG/1
40 TABLET, FILM COATED ORAL DAILY
Status: DISCONTINUED | OUTPATIENT
Start: 2021-07-03 | End: 2021-07-07 | Stop reason: HOSPADM

## 2021-07-02 RX ORDER — HYDROMORPHONE HYDROCHLORIDE 2 MG/ML
INJECTION, SOLUTION INTRAMUSCULAR; INTRAVENOUS; SUBCUTANEOUS AS NEEDED
Status: DISCONTINUED | OUTPATIENT
Start: 2021-07-02 | End: 2021-07-02 | Stop reason: HOSPADM

## 2021-07-02 RX ORDER — ONDANSETRON 2 MG/ML
4 INJECTION INTRAMUSCULAR; INTRAVENOUS
Status: ACTIVE | OUTPATIENT
Start: 2021-07-02 | End: 2021-07-03

## 2021-07-02 RX ORDER — EPHEDRINE SULFATE/0.9% NACL/PF 50 MG/5 ML
SYRINGE (ML) INTRAVENOUS AS NEEDED
Status: DISCONTINUED | OUTPATIENT
Start: 2021-07-02 | End: 2021-07-02 | Stop reason: HOSPADM

## 2021-07-02 RX ORDER — POLYETHYLENE GLYCOL 3350 17 G/17G
17 POWDER, FOR SOLUTION ORAL DAILY
Status: DISCONTINUED | OUTPATIENT
Start: 2021-07-03 | End: 2021-07-07 | Stop reason: HOSPADM

## 2021-07-02 RX ORDER — FENTANYL CITRATE 50 UG/ML
25 INJECTION, SOLUTION INTRAMUSCULAR; INTRAVENOUS
Status: DISCONTINUED | OUTPATIENT
Start: 2021-07-02 | End: 2021-07-02 | Stop reason: HOSPADM

## 2021-07-02 RX ORDER — SODIUM CHLORIDE, SODIUM LACTATE, POTASSIUM CHLORIDE, CALCIUM CHLORIDE 600; 310; 30; 20 MG/100ML; MG/100ML; MG/100ML; MG/100ML
25 INJECTION, SOLUTION INTRAVENOUS CONTINUOUS
Status: DISCONTINUED | OUTPATIENT
Start: 2021-07-02 | End: 2021-07-06

## 2021-07-02 RX ORDER — SUCCINYLCHOLINE CHLORIDE 20 MG/ML
INJECTION INTRAMUSCULAR; INTRAVENOUS AS NEEDED
Status: DISCONTINUED | OUTPATIENT
Start: 2021-07-02 | End: 2021-07-02 | Stop reason: HOSPADM

## 2021-07-02 RX ORDER — ROCURONIUM BROMIDE 10 MG/ML
INJECTION, SOLUTION INTRAVENOUS AS NEEDED
Status: DISCONTINUED | OUTPATIENT
Start: 2021-07-02 | End: 2021-07-02 | Stop reason: HOSPADM

## 2021-07-02 RX ORDER — SODIUM CHLORIDE 9 MG/ML
125 INJECTION, SOLUTION INTRAVENOUS CONTINUOUS
Status: DISPENSED | OUTPATIENT
Start: 2021-07-02 | End: 2021-07-03

## 2021-07-02 RX ORDER — HYDRALAZINE HYDROCHLORIDE 20 MG/ML
10 INJECTION INTRAMUSCULAR; INTRAVENOUS
Status: DISCONTINUED | OUTPATIENT
Start: 2021-07-02 | End: 2021-07-07 | Stop reason: HOSPADM

## 2021-07-02 RX ORDER — PROPOFOL 10 MG/ML
INJECTION, EMULSION INTRAVENOUS AS NEEDED
Status: DISCONTINUED | OUTPATIENT
Start: 2021-07-02 | End: 2021-07-02 | Stop reason: HOSPADM

## 2021-07-02 RX ORDER — HYDROMORPHONE HYDROCHLORIDE 1 MG/ML
0.2 INJECTION, SOLUTION INTRAMUSCULAR; INTRAVENOUS; SUBCUTANEOUS
Status: DISCONTINUED | OUTPATIENT
Start: 2021-07-02 | End: 2021-07-02 | Stop reason: HOSPADM

## 2021-07-02 RX ORDER — LIDOCAINE HYDROCHLORIDE 20 MG/ML
INJECTION, SOLUTION EPIDURAL; INFILTRATION; INTRACAUDAL; PERINEURAL AS NEEDED
Status: DISCONTINUED | OUTPATIENT
Start: 2021-07-02 | End: 2021-07-02 | Stop reason: HOSPADM

## 2021-07-02 RX ORDER — ESMOLOL HYDROCHLORIDE 10 MG/ML
INJECTION INTRAVENOUS AS NEEDED
Status: DISCONTINUED | OUTPATIENT
Start: 2021-07-02 | End: 2021-07-02 | Stop reason: HOSPADM

## 2021-07-02 RX ORDER — ASPIRIN 81 MG/1
81 TABLET ORAL 2 TIMES DAILY
Status: DISCONTINUED | OUTPATIENT
Start: 2021-07-02 | End: 2021-07-07 | Stop reason: HOSPADM

## 2021-07-02 RX ORDER — SODIUM CHLORIDE, SODIUM LACTATE, POTASSIUM CHLORIDE, CALCIUM CHLORIDE 600; 310; 30; 20 MG/100ML; MG/100ML; MG/100ML; MG/100ML
125 INJECTION, SOLUTION INTRAVENOUS CONTINUOUS
Status: DISCONTINUED | OUTPATIENT
Start: 2021-07-02 | End: 2021-07-02 | Stop reason: HOSPADM

## 2021-07-02 RX ORDER — SODIUM CHLORIDE, SODIUM LACTATE, POTASSIUM CHLORIDE, CALCIUM CHLORIDE 600; 310; 30; 20 MG/100ML; MG/100ML; MG/100ML; MG/100ML
INJECTION, SOLUTION INTRAVENOUS
Status: DISCONTINUED | OUTPATIENT
Start: 2021-07-02 | End: 2021-07-02 | Stop reason: HOSPADM

## 2021-07-02 RX ORDER — ONDANSETRON 2 MG/ML
INJECTION INTRAMUSCULAR; INTRAVENOUS AS NEEDED
Status: DISCONTINUED | OUTPATIENT
Start: 2021-07-02 | End: 2021-07-02 | Stop reason: HOSPADM

## 2021-07-02 RX ORDER — ACETAMINOPHEN 325 MG/1
650 TABLET ORAL ONCE
Status: DISCONTINUED | OUTPATIENT
Start: 2021-07-02 | End: 2021-07-02

## 2021-07-02 RX ADMIN — SODIUM CHLORIDE 125 ML/HR: 9 INJECTION, SOLUTION INTRAVENOUS at 13:33

## 2021-07-02 RX ADMIN — HYDROMORPHONE HYDROCHLORIDE 0.2 MG: 2 INJECTION, SOLUTION INTRAMUSCULAR; INTRAVENOUS; SUBCUTANEOUS at 12:26

## 2021-07-02 RX ADMIN — CEFAZOLIN 2 G: 1 INJECTION, POWDER, FOR SOLUTION INTRAMUSCULAR; INTRAVENOUS; PARENTERAL at 11:59

## 2021-07-02 RX ADMIN — DILTIAZEM HYDROCHLORIDE 240 MG: 240 CAPSULE, COATED, EXTENDED RELEASE ORAL at 08:57

## 2021-07-02 RX ADMIN — ASPIRIN 81 MG: 81 TABLET, COATED ORAL at 17:21

## 2021-07-02 RX ADMIN — ONDANSETRON 4 MG: 2 INJECTION INTRAMUSCULAR; INTRAVENOUS at 04:08

## 2021-07-02 RX ADMIN — KETOROLAC TROMETHAMINE 15 MG: 30 INJECTION, SOLUTION INTRAMUSCULAR; INTRAVENOUS at 23:15

## 2021-07-02 RX ADMIN — Medication 5 MG: at 12:09

## 2021-07-02 RX ADMIN — SODIUM CHLORIDE 40 MCG: 900 INJECTION, SOLUTION INTRAVENOUS at 11:55

## 2021-07-02 RX ADMIN — SUCCINYLCHOLINE CHLORIDE 120 MG: 20 INJECTION, SOLUTION INTRAMUSCULAR; INTRAVENOUS at 11:40

## 2021-07-02 RX ADMIN — PROPOFOL 150 MG: 10 INJECTION, EMULSION INTRAVENOUS at 11:40

## 2021-07-02 RX ADMIN — HYDROMORPHONE HYDROCHLORIDE 0.6 MG: 2 INJECTION, SOLUTION INTRAMUSCULAR; INTRAVENOUS; SUBCUTANEOUS at 11:44

## 2021-07-02 RX ADMIN — SODIUM CHLORIDE 125 ML/HR: 9 INJECTION, SOLUTION INTRAVENOUS at 23:11

## 2021-07-02 RX ADMIN — SODIUM CHLORIDE 40 MCG: 900 INJECTION, SOLUTION INTRAVENOUS at 11:58

## 2021-07-02 RX ADMIN — SODIUM CHLORIDE 40 MCG: 900 INJECTION, SOLUTION INTRAVENOUS at 12:01

## 2021-07-02 RX ADMIN — PHENYTOIN SODIUM 200 MG: 100 CAPSULE ORAL at 17:21

## 2021-07-02 RX ADMIN — MIDAZOLAM HYDROCHLORIDE 1 MG: 1 INJECTION, SOLUTION INTRAMUSCULAR; INTRAVENOUS at 11:40

## 2021-07-02 RX ADMIN — SODIUM CHLORIDE 40 MCG/MIN: 900 INJECTION, SOLUTION INTRAVENOUS at 11:52

## 2021-07-02 RX ADMIN — Medication 10 ML: at 21:23

## 2021-07-02 RX ADMIN — Medication 10 ML: at 17:30

## 2021-07-02 RX ADMIN — METOPROLOL SUCCINATE 50 MG: 50 TABLET, EXTENDED RELEASE ORAL at 08:57

## 2021-07-02 RX ADMIN — ACETAMINOPHEN 650 MG: 325 TABLET ORAL at 03:48

## 2021-07-02 RX ADMIN — KETOROLAC TROMETHAMINE 15 MG: 30 INJECTION, SOLUTION INTRAMUSCULAR; INTRAVENOUS at 17:22

## 2021-07-02 RX ADMIN — PHENYTOIN SODIUM 200 MG: 100 CAPSULE ORAL at 08:57

## 2021-07-02 RX ADMIN — DEXAMETHASONE SODIUM PHOSPHATE 4 MG: 10 INJECTION INTRAMUSCULAR; INTRAVENOUS at 11:49

## 2021-07-02 RX ADMIN — ACETAMINOPHEN 650 MG: 325 TABLET ORAL at 08:58

## 2021-07-02 RX ADMIN — LIDOCAINE HYDROCHLORIDE 20 MG: 20 INJECTION, SOLUTION EPIDURAL; INFILTRATION; INTRACAUDAL; PERINEURAL at 11:40

## 2021-07-02 RX ADMIN — CEFAZOLIN SODIUM 2 G: 1 INJECTION, POWDER, FOR SOLUTION INTRAMUSCULAR; INTRAVENOUS at 21:22

## 2021-07-02 RX ADMIN — Medication 1 AMPULE: at 17:23

## 2021-07-02 RX ADMIN — ACETAMINOPHEN 650 MG: 325 TABLET ORAL at 21:22

## 2021-07-02 RX ADMIN — ROCURONIUM BROMIDE 5 MG: 10 INJECTION INTRAVENOUS at 12:26

## 2021-07-02 RX ADMIN — FERROUS SULFATE TAB 325 MG (65 MG ELEMENTAL FE) 325 MG: 325 (65 FE) TAB at 17:22

## 2021-07-02 RX ADMIN — SODIUM CHLORIDE 125 ML/HR: 9 INJECTION, SOLUTION INTRAVENOUS at 17:22

## 2021-07-02 RX ADMIN — ONDANSETRON HYDROCHLORIDE 4 MG: 2 INJECTION, SOLUTION INTRAMUSCULAR; INTRAVENOUS at 11:49

## 2021-07-02 RX ADMIN — HYDRALAZINE HYDROCHLORIDE 10 MG: 20 INJECTION INTRAMUSCULAR; INTRAVENOUS at 01:40

## 2021-07-02 RX ADMIN — SODIUM CHLORIDE, POTASSIUM CHLORIDE, SODIUM LACTATE AND CALCIUM CHLORIDE: 600; 310; 30; 20 INJECTION, SOLUTION INTRAVENOUS at 10:38

## 2021-07-02 RX ADMIN — ACETAMINOPHEN 650 MG: 325 TABLET ORAL at 17:22

## 2021-07-02 RX ADMIN — ESMOLOL HYDROCHLORIDE 20 MCG: 10 INJECTION, SOLUTION INTRAVENOUS at 12:26

## 2021-07-02 RX ADMIN — DEXMEDETOMIDINE HYDROCHLORIDE 10 MCG: 100 INJECTION, SOLUTION, CONCENTRATE INTRAVENOUS at 11:38

## 2021-07-02 RX ADMIN — ROCURONIUM BROMIDE 5 MG: 10 INJECTION INTRAVENOUS at 11:40

## 2021-07-02 RX ADMIN — Medication 10 ML: at 23:13

## 2021-07-02 NOTE — ANESTHESIA PREPROCEDURE EVALUATION
Relevant Problems   NEUROLOGY   (+) Complex partial seizures with consciousness impaired (HCC)   (+) TIA (transient ischemic attack)      CARDIOVASCULAR   (+) A-fib (HCC)   (+) HBP (high blood pressure)       Anesthetic History   No history of anesthetic complications            Review of Systems / Medical History  Patient summary reviewed, nursing notes reviewed and pertinent labs reviewed    Pulmonary  Within defined limits                 Neuro/Psych     seizures  CVA       Cardiovascular    Hypertension        Dysrhythmias            GI/Hepatic/Renal     GERD           Endo/Other        Obesity and arthritis     Other Findings              Physical Exam    Airway  Mallampati: II  TM Distance: > 6 cm  Neck ROM: normal range of motion   Mouth opening: Normal     Cardiovascular  Regular rate and rhythm,  S1 and S2 normal,  no murmur, click, rub, or gallop             Dental  No notable dental hx       Pulmonary  Breath sounds clear to auscultation               Abdominal  GI exam deferred       Other Findings            Anesthetic Plan    ASA: 3  Anesthesia type: general    Monitoring Plan: BIS      Induction: Intravenous  Anesthetic plan and risks discussed with: Patient

## 2021-07-02 NOTE — PROGRESS NOTES
Problem: Falls - Risk of  Goal: *Absence of Falls  Description: Document Jonah Conway Fall Risk and appropriate interventions in the flowsheet.   Outcome: Progressing Towards Goal  Note: Fall Risk Interventions:  Mobility Interventions: Assess mobility with egress test, Communicate number of staff needed for ambulation/transfer, Bed/chair exit alarm, OT consult for ADLs, Strengthening exercises (ROM-active/passive), Utilize walker, cane, or other assistive device, PT Consult for assist device competence, PT Consult for mobility concerns, Patient to call before getting OOB, Utilize gait belt for transfers/ambulation    Mentation Interventions: Adequate sleep, hydration, pain control, Bed/chair exit alarm, Door open when patient unattended, Evaluate medications/consider consulting pharmacy, Eyeglasses and hearing aids, Familiar objects from home, Family/sitter at bedside, Gait belt with transfers/ambulation, Update white board, Toileting rounds, Room close to nurse's station, Reorient patient, More frequent rounding, Increase mobility    Medication Interventions: Assess postural VS orthostatic hypotension, Bed/chair exit alarm, Evaluate medications/consider consulting pharmacy, Patient to call before getting OOB, Teach patient to arise slowly, Utilize gait belt for transfers/ambulation    Elimination Interventions: Bed/chair exit alarm, Call light in reach, Elevated toilet seat, Patient to call for help with toileting needs, Stay With Me (per policy), Toilet paper/wipes in reach, Toileting schedule/hourly rounds    History of Falls Interventions: Consult care management for discharge planning, Bed/chair exit alarm, Door open when patient unattended, Evaluate medications/consider consulting pharmacy, Utilize gait belt for transfer/ambulation, Room close to nurse's station, Vital signs minimum Q4HRs X 24 hrs (comment for end date), Assess for delayed presentation/identification of injury for 48 hrs (comment for end date)         Problem: Patient Education: Go to Patient Education Activity  Goal: Patient/Family Education  Outcome: Progressing Towards Goal     Problem: Pressure Injury - Risk of  Goal: *Prevention of pressure injury  Description: Document Greg Scale and appropriate interventions in the flowsheet. Outcome: Progressing Towards Goal  Note: Pressure Injury Interventions:  Sensory Interventions: Assess changes in LOC, Assess need for specialty bed, Avoid rigorous massage over bony prominences, Chair cushion, Check visual cues for pain, Discuss PT/OT consult with provider, Float heels, Maintain/enhance activity level, Keep linens dry and wrinkle-free, Minimize linen layers, Monitor skin under medical devices, Pad between skin to skin, Pressure redistribution bed/mattress (bed type), Turn and reposition approx. every two hours (pillows and wedges if needed)    Moisture Interventions: Apply protective barrier, creams and emollients, Absorbent underpads, Assess need for specialty bed, Check for incontinence Q2 hours and as needed, Contain wound drainage, Maintain skin hydration (lotion/cream), Limit adult briefs, Minimize layers, Offer toileting Q_hr, Moisture barrier    Activity Interventions: Assess need for specialty bed, Chair cushion, Increase time out of bed, Pressure redistribution bed/mattress(bed type), PT/OT evaluation    Mobility Interventions: Assess need for specialty bed, Chair cushion, Float heels, HOB 30 degrees or less, PT/OT evaluation, Pressure redistribution bed/mattress (bed type), Turn and reposition approx.  every two hours(pillow and wedges)    Nutrition Interventions: Document food/fluid/supplement intake, Discuss nutritional consult with provider, Offer support with meals,snacks and hydration    Friction and Shear Interventions: Apply protective barrier, creams and emollients, Feet elevated on foot rest, Foam dressings/transparent film/skin sealants, HOB 30 degrees or less, Lift sheet, Minimize layers, Lift team/patient mobility team                Problem: Patient Education: Go to Patient Education Activity  Goal: Patient/Family Education  Outcome: Progressing Towards Goal

## 2021-07-02 NOTE — PROGRESS NOTES
Received message from patient's nurse stating / informing that Patient is new admit from ER, BP elevated 173/65, HR 65           Discussion / orders:     Ordered hydralazine 10 mg IV every 6 hours as needed for systolic blood pressure more than 202 or diastolic blood pressure more than 100             Please note that this note was dictated using Dragon computer voice recognition software. Quite often unanticipated grammatical, syntax, homophones, and other interpretive errors are inadvertently transcribed by the computer software. Please disregard these errors. Please excuse any errors that have escaped final proofreading.

## 2021-07-02 NOTE — ED NOTES
Bedside and Verbal shift change report given to [place]  (oncoming nurse) by Aleida Russell RN (offgoing nurse). Report included the following information SBAR, ED Summary, Intake/Output, MAR and Recent Results.

## 2021-07-02 NOTE — PROGRESS NOTES
Bedside and Verbal shift change report given to Aretha (oncoming nurse) by Sallie Morley  (offgoing nurse). Report included the following information SBAR and Kardex.

## 2021-07-02 NOTE — PERIOP NOTES
12:37 PM    = 1 Bottle of IRRISEPT for PRN Irrigation used by MD Larry Bhat during Pt. Procedure.    + REF. #: [id number]  + LOT. #: [id number]  + EXP.  Date: 02/28/2023

## 2021-07-02 NOTE — OP NOTES
Name: Ascencion Pearl  MRN:  [id number]  : 1940  Age:  [de-identified] y.o. Surgery Date: 2021      OPERATIVE REPORT - LEFT HIP HEMIARTHROPLASTY -   ANTERIOR APPROACH    PREOPERATIVE DIAGNOSIS: Femoral neck fracture left hip    POSTOPERATIVE DIAGNOSIS: Femoral neck fracture left hip    PROCEDURE PERFORMED: Left bipolar hemiarthroplasty    SURGEON: Laureano Olivier MD    FIRST ASSISTANT:  Peter    ANESTHESIA: General    PRE-OP ANTIBIOTIC: Ancef 2g    COMPLICATIONS: None. ESTIMATED BLOOD LOSS: 150 mL. SPECIMENS REMOVED: None    COMPONENTS IMPLANTED:   Implant Name Type Inv. Item Serial No.  Lot No. LRB No. Used Action   HEAD FEM [employer] [id number] GRY --  - SN/A  HEAD FEM [employer] [id number] GRY --  N/A [facility] [employer] [id number] Left 1 Implanted   STEM FEM SZ 14 L160MM NK L38.5MM 42MM OFFSET 135DEG STD HIP - SN/A  STEM FEM SZ 14 L160MM NK L38.5MM 42MM OFFSET 135DEG STD HIP N/A [employer] [id number] Left 1 Implanted   HEAD FEM [id number] NK L+1.5MM HIP MTL ON MTL  TAPR - SN/A  HEAD FEM [id number] NK L+1.5MM HIP MTL ON MTL  TAPR N/A [employer] [id number] Left 1 Implanted       INDICATIONS: The patient is an [de-identified] yrs female with a left femoral neck fracture. Risks, benefits, alternatives of the procedure were reviewed and the patient desired to proceed. They understood the increased risk for perioperative medical complications due to their medical comorbidities. They were deemed medically optimized prior to surgery by the hospitalist service. DESCRIPTION OF PROCEDURE: Anesthetic was initiated. Preoperative dose of IV  antibiotic was given. Xavier catheter was placed. The left side was confirmed as the operative side, prepped and draped in the usual sterile fashion. Skin was covered with Ioban occlusive dressing. Tranexamic acid 1 gram IV was given intravenously if not contraindicated.     Direct anterior exposure was made to the patient's hip through the sartorius tensor interval well lateral of the ASIS to protect the LFCN. Anterior hip vasculature including the ascending branches of the lateral circumflex artery were cauterized. Retractors were taken out to observe for bleeding and there was none. A T capsulotomy was made with bovie electrocautery. The Charnley retractor was repositioned for exposure. The femoral neck was recut. Femoral head was removed from the acetabulum. Femur was positioned and elevated from the wound. Appropriate soft tissue releases were made including the posterior capsule and obturator internus. The medullary canal was entered with a box osteotome. The femur was broached to a size 14. Calcar planed and then trialed. A 44 mm , +1 hip ball was the most appropriate for leg length and tension with offset to best match native offset. The hip was dislocated. The trial was removed and the real stem was impacted. The real hip ball was placed. The hip was reduced. After copious irrigation, the capsule was closed with #1 Stratafix barbed sutures. I irrigated the skin, subcutaneous and deep wound. I closed the fascia of the tensor fascia grace with #1 stratafix barbed sutures. Skin and subcutaneous were irrigated. Soft tissues were infiltrated with local anesthetic. 2 grams of tranexamic acid with 0.4 mL of epi given topically in the wound. Skin and subcutaneous were closed in a standard fashion with 2-0 vicryl and 3-0 monocryl followed by Dermabond. An aquacel dressing was applied. There were no complications. No specimen was sent. The procedure was a LEFT BIPOLAR HEMIARTHROPLASTY using a Depuy construct. The patient was transferred to the recovery room in stable condition.     Vince Frye MD

## 2021-07-02 NOTE — PERIOP NOTES
[address] from Operating Room to PACU    Report received from [address] [facility] and [place] [address] regarding Josue Yoder. Surgeon(s):  Karma Calderon MD  And Procedure(s) (LRB):  LEFT HIP HEMIARTHROPLASTY (Left)  confirmed   with allergies and dressings discussed. Anesthesia type, drugs, patient history, complications, estimated blood loss, vital signs, intake and output, and last pain medication, lines and temperature were reviewed. 1530 TRANSFER - OUT REPORT:    Verbal report given to Aretha HUERTAS(name) on Josue Yoder  being transferred to [place] [address](unit) for routine post - op       Report consisted of patients Situation, Background, Assessment and   Recommendations(SBAR). Information from the following report(s) SBAR, Kardex, OR Summary and MAR was reviewed with the receiving nurse. Lines:   Peripheral IV 07/02/21 Right Antecubital (Active)   Site Assessment Clean, dry, & intact 07/02/21 1322   Phlebitis Assessment 0 07/02/21 1322   Infiltration Assessment 0 07/02/21 1322   Dressing Status Clean, dry, & intact 07/02/21 1322   Dressing Type Transparent;Tape 07/02/21 1322   Hub Color/Line Status Pink; Infusing 07/02/21 1322        Opportunity for questions and clarification was provided.       Patient transported with:   O2 @ 2 liters  Registered Nurse  [facility]

## 2021-07-02 NOTE — CONSULTS
FRACTURE CONSULT NOTE    Subjective:     Date of Consultation:  2021  Referring Physician:  Kong Sosa is a [de-identified] y.o. female who sustained a ground level fall yesterday landing on her left hip. No LOC. No other complaints.  Could not ambulate afterwards    Patient Active Problem List    Diagnosis Date Noted    TIA (transient ischemic attack) 04/15/2011    Organic heart disease, NYHA class 2 04/15/2011    HBP (high blood pressure) 04/15/2011    Hyperlipidemia 04/15/2011    A-fib ([address] 04/15/2011    History of breast cancer 04/15/2011    Complex partial seizures with consciousness impaired ([address] 04/15/2011    CVA (cerebral infarction) 04/15/2011    Femoral neck fracture ([address] 2021    Acute respiratory failure ([address] 2014    Intussusception ([address] 2012    UTI (urinary tract infection) 2012     Family History   Problem Relation Age of Onset    Stroke Father       Social History     Tobacco Use    Smoking status: Never Smoker    Smokeless tobacco: Never Used   Substance Use Topics    Alcohol use: No     Past Medical History:   Diagnosis Date    Arthritis     Bladder disorder     Cancer ([address]     breast cancer    Gastrointestinal disorder     acid reflux    GERD (gastroesophageal reflux disease)     High cholesterol     Hypertension     Other ill-defined conditions(799.89)     pneumonia    Psychiatric disorder     depression    Seizures ([address]     Stroke ([address]     Thromboembolus ([address]       Past Surgical History:   Procedure Laterality Date    HX CATARACT REMOVAL      bilateral    HX  SECTION      x 3    HX CHOLECYSTECTOMY      HX ORTHOPAEDIC      left arm, left leg, right wrist    HX ORTHOPAEDIC  13    ELBOW OPEN REDUCTION INTERNAL FIXATION (Right    HX OTHER SURGICAL      Brain surgery s/p CVA    HX OTHER SURGICAL      Bladder stimulator 12    HX OTHER SURGICAL      gastric bypass    [place] BREAST SURGERY PROCEDURE UNLISTED leftt breast lumpectomy      Prior to Admission medications    Medication Sig Start Date End Date Taking? Authorizing Provider   metoprolol succinate (TOPROL-XL) 50 mg XL tablet Take 50 mg by mouth daily. 9/1/14  Yes Silas, MD Lars   phenytoin ER (DILANTIN ER) 100 mg ER capsule Take 200 mg by mouth two (2) times a day. 9/1/14  Yes Lars Rosen MD   citalopram (CELEXA) 40 mg tablet Take 40 mg by mouth daily. Yes Provider, Historical   diltiazem CD (CARTIA XT) 240 mg ER capsule Take 240 mg by mouth daily. Yes Other, MD Lars   aspirin delayed-release 81 mg tablet Take 1 Tab by mouth daily. 4/15/11  Yes Nilsa Ku MD   ferrous sulfate (IRON) 325 mg (65 mg Iron) tablet Take 65 mg by mouth two (2) times a day. 9/27/10  Yes Lars Rosen MD   simvastatin (ZOCOR) 40 mg tablet Take 40 mg by mouth nightly.    Yes Other, MD Lars     Current Facility-Administered Medications   Medication Dose Route Frequency    hydrALAZINE (APRESOLINE) 20 mg/mL injection 10 mg  10 mg IntraVENous Q6H PRN    morphine injection 1 mg  1 mg IntraVENous Q2H PRN    lactated Ringers infusion  25 mL/hr IntraVENous CONTINUOUS    alcohol 62% (NOZIN) nasal  3 Ampule  3 Ampule Topical ONCE    lactated Ringers infusion  25 mL/hr IntraVENous CONTINUOUS    0.9% sodium chloride infusion  25 mL/hr IntraVENous CONTINUOUS    sodium chloride (NS) flush 5-40 mL  5-40 mL IntraVENous Q8H    sodium chloride (NS) flush 5-40 mL  5-40 mL IntraVENous PRN    lidocaine (PF) (XYLOCAINE) 10 mg/mL (1 %) injection 0.1 mL  0.1 mL SubCUTAneous PRN    fentaNYL citrate (PF) injection 50 mcg  50 mcg IntraVENous PRN    midazolam (VERSED) injection 1 mg  1 mg IntraVENous PRN    midazolam (VERSED) injection 1 mg  1 mg IntraVENous PRN    acetaminophen (TYLENOL) tablet 650 mg  650 mg Oral ONCE    ropivacaine (PF) 0.5% 60 mL/ NS 37.9 mL 30 mL in 0.9% sodium chloride 30 mL solution    CONTINUOUS    acetaminophen (TYLENOL) tablet 650 mg  650 mg Oral Q6H  naloxone (NARCAN) injection 0.4 mg  0.4 mg IntraVENous PRN    [Held by provider] senna-docusate (PERICOLACE) 8.6-50 mg per tablet 2 Tablet  2 Tablet Oral BID    dilTIAZem ER (CARDIZEM CD) capsule 240 mg  240 mg Oral DAILY    ferrous sulfate tablet 325 mg  325 mg Oral BID    metoprolol succinate (TOPROL-XL) XL tablet 50 mg  50 mg Oral DAILY    phenytoin ER (DILANTIN ER) ER capsule 200 mg  200 mg Oral BID    atorvastatin (LIPITOR) tablet 20 mg  20 mg Oral DAILY    sodium chloride (NS) flush 5-40 mL  5-40 mL IntraVENous Q8H    sodium chloride (NS) flush 5-40 mL  5-40 mL IntraVENous PRN    acetaminophen (TYLENOL) tablet 650 mg  650 mg Oral Q6H PRN    Or    acetaminophen (TYLENOL) suppository 650 mg  650 mg Rectal Q6H PRN    polyethylene glycol (MIRALAX) packet 17 g  17 g Oral DAILY PRN    ondansetron (ZOFRAN ODT) tablet 4 mg  4 mg Oral Q8H PRN    Or    ondansetron (ZOFRAN) injection 4 mg  4 mg IntraVENous Q6H PRN    [Held by provider] enoxaparin (LOVENOX) injection 40 mg  40 mg SubCUTAneous DAILY     Facility-Administered Medications Ordered in Other Encounters   Medication Dose Route Frequency    ePHEDrine in NS (PF) (MISTOLE) 10 mg/mL in NS syringe   IntraVENous PRN    PHENYLephrine (FIDELINA-SYNEPHRINE) 10 mg in 0.9% sodium chloride 250 mL infusion   IntraVENous CONTINUOUS    ceFAZolin (ANCEF) injection   IntraVENous PRN    esmoloL (BREVIBLOC) 100 mg/10 mL (10 mg/mL) injection   IntraVENous PRN    HYDROmorphone (PF) (DILAUDID) injection   IntraVENous PRN    rocuronium injection   IntraVENous PRN    midazolam (VERSED) injection   IntraVENous PRN    lidocaine (PF) (XYLOCAINE) 20 mg/mL (2 %) injection   IntraVENous PRN    propofoL (DIPRIVAN) 10 mg/mL injection   IntraVENous PRN    succinylcholine (ANECTINE) injection   IntraVENous PRN    dexamethasone (DECADRON) 10 mg/mL injection   IntraVENous PRN    ondansetron (ZOFRAN) injection   IntraVENous PRN    dexmedeTOMidine (PRECEDEX) 100 mcg/mL iv solution   IntraVENous PRN    lactated Ringers infusion   IntraVENous CONTINUOUS    PHENYLephrine (FIDELINA-SYNEPHRINE) 10 mg in 0.9% sodium chloride 250 mL infusion   IntraVENous CONTINUOUS      Allergies   Allergen Reactions    Codeine Rash     Doesn't do well with morphine and its derivatives, tolerates tramadol    Penicillins Swelling        Review of Systems:    Negative for fevers, chills, nausea, vomiting, chest pain, shortness of breath, headaches.       Objective:     Patient Vitals for the past 24 hrs:   Temp Pulse Resp BP SpO2   21 1000 98.2 °F (36.8 °C) 64 14 (!) 143/64 97 %   21 0755 97.8 °F (36.6 °C) 83 18 135/70 97 %   21 0352 98 °F (36.7 °C) 73 20 (!) 155/63 99 %   21 0140  71  (!) 171/68    21 0033 98 °F (36.7 °C) 68 20 (!) 173/65 100 %   21 2315  64 18 (!) 159/57 97 %   21 2300  62 21 (!) 162/50 99 %   21 2245  61 22 (!) 158/45 99 %   21 2230  60 20 (!) 149/46 98 %   21 2215  60 19 (!) 163/52 100 %   21 2200  (!) 58 17 (!) 151/116 99 %   21 2145  (!) 59 20 (!) 141/62 98 %   21 2130  62 16 (!) 167/62 95 %   21 2115  65 22 (!) 160/75 96 %   21 2100  60 20 (!) 141/83 96 %   21 2045  65 18 (!) 152/62 95 %   21 2030  62 21 (!) 149/57 97 %   21  61 20 (!) 149/55 96 %   21  62 20 138/77 95 %   21 1945  63 18 (!) 145/64 97 %   21 1930  61 22 (!) 154/61 96 %   21 1915  68 15 (!) 171/61    21 1900  66 17 (!) 158/82 94 %   21 1854    (!) 150/62    21 1830  70 20 (!) 190/88 97 %   21 1745  66 19 (!) 178/72    21 1512     98 %   21 1320 98.4 °F (36.9 °C) (!) 56 18 (!) 152/66 (!) 85 %       Temp (24hrs), Av.1 °F (36.7 °C), Min:97.8 °F (36.6 °C), Max:98.4 °F (36.9 °C)      Gen: NAD, A&Ox3  Resp: Non-labored breathing  CV: Extremities well perfused  Abd: soft, NT  LLE: shortened, rotated, did not range due to pain, skin intact, warm well perfused, SILT in al distributions L3-S1, palpable pedal pulses, no calf tenderness  RLE: no pain with ROM, no swelling about knee or ankle, skin intact, warm well perfused, SILT in al distributions L3-S1, palpable pedal pulses, no calf tenderness    Imaging Review: Displaced left femoral neck fracture  Labs:   Recent Results (from the past 24 hour(s))   EKG, 12 LEAD, INITIAL    Collection Time: 07/01/21  2:20 PM   Result Value Ref Range    Ventricular Rate 58 BPM    Atrial Rate 58 BPM    P-R Interval 200 ms    QRS Duration 106 ms    Q-T Interval 494 ms    QTC Calculation (Bezet) 484 ms    Calculated P Axis 61 degrees    Calculated R Axis -35 degrees    Calculated T Axis 65 degrees    Diagnosis       Sinus bradycardia  Left axis deviation  Minimal voltage criteria for LVH, may be normal variant  When compared with ECG of 23-JAN-2019 13:09,  No significant change was found  Confirmed by Barbara Rock MD, Barney Saint ([id number]) on 7/1/2021 4:28:41 PM     CBC WITH AUTOMATED DIFF    Collection Time: 07/01/21  2:49 PM   Result Value Ref Range    WBC 18.7 (H) 3.6 - 11.0 K/uL    RBC 4.29 3.80 - 5.20 M/uL    HGB 13.5 11.5 - 16.0 g/dL    HCT 41.6 35.0 - 47.0 %    MCV 97.0 80.0 - 99.0 FL    MCH 31.5 26.0 - 34.0 PG    MCHC 32.5 30.0 - 36.5 g/dL    RDW 13.7 11.5 - 14.5 %    PLATELET 047 547 - 003 K/uL    MPV 9.0 8.9 - 12.9 FL    NRBC 0.0 0  WBC    ABSOLUTE NRBC 0.00 0.00 - 0.01 K/uL    NEUTROPHILS 88 (H) 32 - 75 %    LYMPHOCYTES 5 (L) 12 - 49 %    MONOCYTES 5 5 - 13 %    EOSINOPHILS 1 0 - 7 %    BASOPHILS 0 0 - 1 %    IMMATURE GRANULOCYTES 1 (H) 0.0 - 0.5 %    ABS. NEUTROPHILS 16.5 (H) 1.8 - 8.0 K/UL    ABS. LYMPHOCYTES 0.9 0.8 - 3.5 K/UL    ABS. MONOCYTES 1.0 0.0 - 1.0 K/UL    ABS. EOSINOPHILS 0.2 0.0 - 0.4 K/UL    ABS. BASOPHILS 0.0 0.0 - 0.1 K/UL    ABS. IMM.  GRANS. 0.2 (H) 0.00 - 0.04 K/UL    DF AUTOMATED     TYPE & SCREEN    Collection Time: 07/01/21  2:49 PM   Result Value Ref Range Crossmatch Expiration 07/04/2021,235     ABO/Rh(D) A POSITIVE     Antibody screen NEG    PROTHROMBIN TIME + INR    Collection Time: 07/01/21  2:49 PM   Result Value Ref Range    INR 1.1 0.9 - 1.1      Prothrombin time 11.0 9.0 - 38.7 sec   METABOLIC PANEL, COMPREHENSIVE    Collection Time: 07/01/21  2:49 PM   Result Value Ref Range    Sodium 135 (L) 136 - 145 mmol/L    Potassium 3.6 3.5 - 5.1 mmol/L    Chloride 97 97 - 108 mmol/L    CO2 33 (H) 21 - 32 mmol/L    Anion gap 5 5 - 15 mmol/L    Glucose 86 65 - 100 mg/dL    BUN 14 6 - 20 MG/DL    Creatinine 0.68 0.55 - 1.02 MG/DL    BUN/Creatinine ratio 21 (H) 12 - 20      GFR est AA >60 >60 ml/min/1.73m2    GFR est non-AA >60 >60 ml/min/1.73m2    Calcium 8.5 8.5 - 10.1 MG/DL    Bilirubin, total 0.6 0.2 - 1.0 MG/DL    ALT (SGPT) 23 12 - 78 U/L    AST (SGOT) 28 15 - 37 U/L    Alk.  phosphatase 115 45 - 117 U/L    Protein, total 6.3 (L) 6.4 - 8.2 g/dL    Albumin 3.0 (L) 3.5 - 5.0 g/dL    Globulin 3.3 2.0 - 4.0 g/dL    A-G Ratio 0.9 (L) 1.1 - 2.2     MAGNESIUM    Collection Time: 07/01/21  2:49 PM   Result Value Ref Range    Magnesium 1.8 1.6 - 2.4 mg/dL   URINALYSIS W/ REFLEX CULTURE    Collection Time: 07/01/21  4:28 PM    Specimen: Urine   Result Value Ref Range    Color YELLOW/STRAW      Appearance CLOUDY (A) CLEAR      Specific gravity 1.023 1.003 - 1.030      pH (UA) 5.5 5.0 - 8.0      Protein Negative NEG mg/dL    Glucose Negative NEG mg/dL    Ketone TRACE (A) NEG mg/dL    Bilirubin Negative NEG      Blood TRACE (A) NEG      Urobilinogen 1.0 0.2 - 1.0 EU/dL    Nitrites Positive (A) NEG      Leukocyte Esterase SMALL (A) NEG      WBC 5-10 0 - 4 /hpf    RBC 0-5 0 - 5 /hpf    Epithelial cells FEW FEW /lpf    Bacteria 3+ (A) NEG /hpf    UA:UC IF INDICATED CULTURE NOT INDICATED BY UA RESULT CNI     CULTURE, BLOOD, PAIRED    Collection Time: 07/01/21  5:25 PM    Specimen: Blood   Result Value Ref Range    Special Requests: NO SPECIAL REQUESTS      Culture result: NO GROWTH AFTER 13 HOURS     LACTIC ACID    Collection Time: 07/01/21  5:25 PM   Result Value Ref Range    Lactic acid 0.7 0.4 - 2.0 MMOL/L   CBC WITH AUTOMATED DIFF    Collection Time: 07/02/21  5:22 AM   Result Value Ref Range    WBC 16.1 (H) 3.6 - 11.0 K/uL    RBC 4.50 3.80 - 5.20 M/uL    HGB 14.5 11.5 - 16.0 g/dL    HCT 43.7 35.0 - 47.0 %    MCV 97.1 80.0 - 99.0 FL    MCH 32.2 26.0 - 34.0 PG    MCHC 33.2 30.0 - 36.5 g/dL    RDW 13.7 11.5 - 14.5 %    PLATELET 979 513 - 192 K/uL    MPV 9.0 8.9 - 12.9 FL    NRBC 0.0 0  WBC    ABSOLUTE NRBC 0.00 0.00 - 0.01 K/uL    NEUTROPHILS 88 (H) 32 - 75 %    LYMPHOCYTES 5 (L) 12 - 49 %    MONOCYTES 6 5 - 13 %    EOSINOPHILS 1 0 - 7 %    BASOPHILS 0 0 - 1 %    IMMATURE GRANULOCYTES 0 0.0 - 0.5 %    ABS. NEUTROPHILS 14.1 (H) 1.8 - 8.0 K/UL    ABS. LYMPHOCYTES 0.8 0.8 - 3.5 K/UL    ABS. MONOCYTES 1.0 0.0 - 1.0 K/UL    ABS. EOSINOPHILS 0.2 0.0 - 0.4 K/UL    ABS. BASOPHILS 0.0 0.0 - 0.1 K/UL    ABS. IMM. GRANS. 0.1 (H) 0.00 - 0.04 K/UL    DF AUTOMATED     METABOLIC PANEL, COMPREHENSIVE    Collection Time: 07/02/21  5:22 AM   Result Value Ref Range    Sodium 131 (L) 136 - 145 mmol/L    Potassium 3.6 3.5 - 5.1 mmol/L    Chloride 96 (L) 97 - 108 mmol/L    CO2 30 21 - 32 mmol/L    Anion gap 5 5 - 15 mmol/L    Glucose 97 65 - 100 mg/dL    BUN 13 6 - 20 MG/DL    Creatinine 0.61 0.55 - 1.02 MG/DL    BUN/Creatinine ratio 21 (H) 12 - 20      GFR est AA >60 >60 ml/min/1.73m2    GFR est non-AA >60 >60 ml/min/1.73m2    Calcium 9.6 8.5 - 10.1 MG/DL    Bilirubin, total 0.6 0.2 - 1.0 MG/DL    ALT (SGPT) 22 12 - 78 U/L    AST (SGOT) 23 15 - 37 U/L    Alk.  phosphatase 123 (H) 45 - 117 U/L    Protein, total 7.0 6.4 - 8.2 g/dL    Albumin 3.3 (L) 3.5 - 5.0 g/dL    Globulin 3.7 2.0 - 4.0 g/dL    A-G Ratio 0.9 (L) 1.1 - 2.2           Current Facility-Administered Medications   Medication Dose Route Frequency    hydrALAZINE (APRESOLINE) 20 mg/mL injection 10 mg  10 mg IntraVENous Q6H PRN    morphine injection 1 mg  1 mg IntraVENous Q2H PRN    lactated Ringers infusion  25 mL/hr IntraVENous CONTINUOUS    alcohol 62% (NOZIN) nasal  3 Ampule  3 Ampule Topical ONCE    lactated Ringers infusion  25 mL/hr IntraVENous CONTINUOUS    0.9% sodium chloride infusion  25 mL/hr IntraVENous CONTINUOUS    sodium chloride (NS) flush 5-40 mL  5-40 mL IntraVENous Q8H    sodium chloride (NS) flush 5-40 mL  5-40 mL IntraVENous PRN    lidocaine (PF) (XYLOCAINE) 10 mg/mL (1 %) injection 0.1 mL  0.1 mL SubCUTAneous PRN    fentaNYL citrate (PF) injection 50 mcg  50 mcg IntraVENous PRN    midazolam (VERSED) injection 1 mg  1 mg IntraVENous PRN    midazolam (VERSED) injection 1 mg  1 mg IntraVENous PRN    acetaminophen (TYLENOL) tablet 650 mg  650 mg Oral ONCE    ropivacaine (PF) 0.5% 60 mL/ NS 37.9 mL 30 mL in 0.9% sodium chloride 30 mL solution    CONTINUOUS    acetaminophen (TYLENOL) tablet 650 mg  650 mg Oral Q6H    naloxone (NARCAN) injection 0.4 mg  0.4 mg IntraVENous PRN    [Held by provider] senna-docusate (PERICOLACE) 8.6-50 mg per tablet 2 Tablet  2 Tablet Oral BID    dilTIAZem ER (CARDIZEM CD) capsule 240 mg  240 mg Oral DAILY    ferrous sulfate tablet 325 mg  325 mg Oral BID    metoprolol succinate (TOPROL-XL) XL tablet 50 mg  50 mg Oral DAILY    phenytoin ER (DILANTIN ER) ER capsule 200 mg  200 mg Oral BID    atorvastatin (LIPITOR) tablet 20 mg  20 mg Oral DAILY    sodium chloride (NS) flush 5-40 mL  5-40 mL IntraVENous Q8H    sodium chloride (NS) flush 5-40 mL  5-40 mL IntraVENous PRN    acetaminophen (TYLENOL) tablet 650 mg  650 mg Oral Q6H PRN    Or    acetaminophen (TYLENOL) suppository 650 mg  650 mg Rectal Q6H PRN    polyethylene glycol (MIRALAX) packet 17 g  17 g Oral DAILY PRN    ondansetron (ZOFRAN ODT) tablet 4 mg  4 mg Oral Q8H PRN    Or    ondansetron (ZOFRAN) injection 4 mg  4 mg IntraVENous Q6H PRN    [Held by provider] enoxaparin (LOVENOX) injection 40 mg  40 mg SubCUTAneous DAILY     Facility-Administered Medications Ordered in Other Encounters   Medication Dose Route Frequency    ePHEDrine in NS (PF) (MISTOLE) 10 mg/mL in NS syringe   IntraVENous PRN    PHENYLephrine (FIDELINA-SYNEPHRINE) 10 mg in 0.9% sodium chloride 250 mL infusion   IntraVENous CONTINUOUS    ceFAZolin (ANCEF) injection   IntraVENous PRN    esmoloL (BREVIBLOC) 100 mg/10 mL (10 mg/mL) injection   IntraVENous PRN    HYDROmorphone (PF) (DILAUDID) injection   IntraVENous PRN    rocuronium injection   IntraVENous PRN    midazolam (VERSED) injection   IntraVENous PRN    lidocaine (PF) (XYLOCAINE) 20 mg/mL (2 %) injection   IntraVENous PRN    propofoL (DIPRIVAN) 10 mg/mL injection   IntraVENous PRN    succinylcholine (ANECTINE) injection   IntraVENous PRN    dexamethasone (DECADRON) 10 mg/mL injection   IntraVENous PRN    ondansetron (ZOFRAN) injection   IntraVENous PRN    dexmedeTOMidine (PRECEDEX) 100 mcg/mL iv solution   IntraVENous PRN    lactated Ringers infusion   IntraVENous CONTINUOUS    PHENYLephrine (FIDELINA-SYNEPHRINE) 10 mg in 0.9% sodium chloride 250 mL infusion   IntraVENous CONTINUOUS         Impression:     Active Problems:    Femoral neck fracture (HCC) (7/1/2021)    [de-identified] yo female presents with displaced left femoral neck fracture after GLF. Deemed medically optimized for surgery.     Plan:   Plan for left hip hemiarthroplasty        North Whiting MD

## 2021-07-02 NOTE — PROGRESS NOTES
Ortho:    Phone call this morning with pt's daughter/POA  Consent for procedure and blood productus if necessary - verified by RN izzy Manzano [phone]    Mikala Aponte PA-C

## 2021-07-02 NOTE — ED NOTES
Patient is being transferred to [facility], Room # 9934. Report given to ALEKSANDAR Monroy on [facility] for routine progression of care. Report consisted of the following information SBAR, Kardex and ED Summary. Patient transferred to receiving unit by: tech (RN or tech name). Outstanding consults needed: No     Next labs due: Yes    The following personal items will be sent with the patient during transfer to the floor: All valuables:    Cardiac monitoring ordered: No     The following CURRENT information was reported to the receiving RN:    Code status: Full Code at time of transfer    Last set of vital signs:  Vital Signs  Level of Consciousness: Alert (0) (07/01/21 1830)  Temp: 98.4 °F (36.9 °C) (07/01/21 1320)  Temp Source: Oral (07/01/21 1320)  Pulse (Heart Rate): 64 (07/01/21 2315)  Resp Rate: 18 (07/01/21 2315)  BP: (!) 159/57 (07/01/21 2315)  MAP (Monitor): 80 (07/01/21 2315)  MAP (Calculated): 91 (07/01/21 2315)  BP 1 Location: Left upper arm (07/01/21 1320)  BP 1 Method: Automatic (07/01/21 1320)  MEWS Score: 1 (07/01/21 1320)         Oxygen Therapy  O2 Sat (%): 97 % (07/01/21 2315)  Pulse via Oximetry: 64 beats per minute (07/01/21 2315)  O2 Device: None (Room air) (07/01/21 1320)      Last pain assessment:  Pain 1  Pain Scale 1: Numeric (0 - 10)  Pain Intensity 1: 5  Patient Stated Pain Goal: 0  Pain Reassessment 1: Yes  Pain Onset 1: 1000  Pain Intervention(s) 1: Medication (see MAR)      Wounds: No     Urinary catheter: hopkins  Is there a hopkins order: Yes    LDAs:       Peripheral IV 07/01/21 Anterior;Left;Proximal Forearm (Active)         Opportunity for questions and clarification was provided.     Tosin Viveros

## 2021-07-02 NOTE — PROGRESS NOTES
1:05 AM  Unable to complete admission database as patient is currently confused, states its \"6385 something\" and when asked what month it is she says \"I forgot that too\"

## 2021-07-02 NOTE — PROGRESS NOTES
End of Shift Note    Bedside shift change report given to Hyun HUERTAS (oncoming nurse) by Brionna Carver RN (offgoing nurse). Report included the following information SBAR, Kardex, Intake/Output, MAR, Recent Results and Cardiac Rhythm nsr/pebbles    Shift worked:  [id number]     Shift summary and any significant changes:     postop VS stable. Pt drowsy but arousable. No complaints of pain     Concerns for physician to address:       Zone phone for oncoming shift:   [phone]       Activity:  Activity Level: Up with Assistance  Number times ambulated in hallways past shift: 0  Number of times OOB to chair past shift: 0    Cardiac:   Cardiac Monitoring: Yes      Cardiac Rhythm: Sinus Rhythm    Access:   Current line(s): PIV     Genitourinary:   Urinary status: hopkins    Respiratory:   O2 Device: Nasal cannula  Chronic home O2 use?: NO  Incentive spirometer at bedside: YES     GI:  Last Bowel Movement Date: 07/01/21  Current diet:  ADULT DIET Regular  Passing flatus: YES  Tolerating current diet: YES       Pain Management:   Patient states pain is manageable on current regimen: YES    Skin:  Greg Score: 15  Interventions: float heels, increase time out of bed, foam dressing, PT/OT consult, limit briefs and internal/external urinary devices    Patient Safety:  Fall Score:  Total Score: 5  Interventions: bed/chair alarm, assistive device (walker, cane, etc), gripper socks, pt to call before getting OOB and stay with me (per policy)  High Fall Risk: Yes    Length of Stay:  Expected LOS: - - -  Actual LOS: 1      Aretha Meade RN

## 2021-07-02 NOTE — ANESTHESIA POSTPROCEDURE EVALUATION
Procedure(s):  LEFT HIP HEMIARTHROPLASTY. general    Anesthesia Post Evaluation        Patient location during evaluation: PACU  Note status: Adequate. Level of consciousness: responsive to verbal stimuli and sleepy but conscious  Pain management: satisfactory to patient  Airway patency: patent  Anesthetic complications: no  Cardiovascular status: acceptable  Respiratory status: acceptable  Hydration status: acceptable  Comments: +Post-Anesthesia Evaluation and Assessment    Patient: Paola Caldera MRN: [id number]  SSN: [id number]   YOB: 1940  Age: [de-identified] y.o. Sex: female      Cardiovascular Function/Vital Signs    BP (!) 119/46   Pulse 70   Temp 37.1 °C (98.8 °F)   Resp 18   Ht 5' 3\" (1.6 m)   Wt 90.7 kg (200 lb)   SpO2 96%   BMI 35.43 kg/m²     Patient is status post Procedure(s):  LEFT HIP HEMIARTHROPLASTY. Nausea/Vomiting: Controlled. Postoperative hydration reviewed and adequate. Pain:  Pain Scale 1: Visual (07/02/21 1400)  Pain Intensity 1: 0 (07/02/21 1400)   Managed. Neurological Status:   Neuro (WDL): Exceptions to WDL (07/02/21 1322)   At baseline. Mental Status and Level of Consciousness: Arousable. Pulmonary Status:   O2 Device: Nasal cannula (07/02/21 1322)   Adequate oxygenation and airway patent. Complications related to anesthesia: None    Post-anesthesia assessment completed. No concerns. Signed By: Jia Xiong MD    7/2/2021  Post anesthesia nausea and vomiting:  controlled      INITIAL Post-op Vital signs:   Vitals Value Taken Time   /45 07/02/21 1415   Temp 37.1 °C (98.8 °F) 07/02/21 1322   Pulse 70 07/02/21 1428   Resp 15 07/02/21 1428   SpO2 96 % 07/02/21 1428   Vitals shown include unvalidated device data.

## 2021-07-02 NOTE — PERIOP NOTES
1000 - SBAR IN NOTE. PT ARRIVED INTO PRE-OP HOLDING D - A&O X4 - CUEVA SPON AND TO COMMAND WEAKLY - GARBLED SPEECH NOTED ON OCCASION. RESP EVEN AND UNLABORED. POX ON 3LNC > 94%. BSB AND COARSE ON AUSC. PT C/O 8/10 GENERALIZED PAIN - PT ABLE TO REST IN SHORT NAPS. PRE-OP TCHING DONE- PT VERBALIZES UNDERSTANDING. SR UP X4, CB IN PLACE. WAITING SURGERY.

## 2021-07-02 NOTE — PROGRESS NOTES
Bedside and Verbal shift change report given to Heriberto AYON (oncoming nurse) by Inis Libman (offgoing nurse). Report included the following information SBAR, Kardex, Intake/Output, MAR, Recent Results, Med Rec Status and Cardiac Rhythm NSR/Sinus Tach.

## 2021-07-02 NOTE — PROGRESS NOTES
Hospitalist Progress Note    NAME: Emelina Moya   :  1940   MRN:  [id number]       Assessment / Plan:  Ground-level fall andAcute left femoral neck fracture POA  Ortho consultation  Marc Ospina for surgery today    Patient has diarrhea over the last few days  Leukocytosis  Follow-up stool study  And likely C. Difficile    Stool softer that she was on held   Hypertension continue home medications  Chronic atrial fibrillation not on anticoagulation seen in chart. Continue  rate control medications  History of seizure continue with seizure medication      30.0 - 39.9 Obese / Body mass index is 35.43 kg/m². Estimated discharge date:   Barriers:    Code status: Full  Prophylaxis: Lovenox and Per Ortho  Recommended Disposition: SNF/LTC     Subjective:     Chief Complaint / Reason for Physician Visit  \"\". Discussed with RN events overnight. Review of Systems:  Symptom Y/N Comments  Symptom Y/N Comments   Fever/Chills    Chest Pain     Poor Appetite    Edema     Cough    Abdominal Pain     Sputum    Joint Pain     SOB/STEIN    Pruritis/Rash     Nausea/vomit    Tolerating PT/OT     Diarrhea    Tolerating Diet     Constipation    Other       Could NOT obtain due to:      Objective:     VITALS:   Last 24hrs VS reviewed since prior progress note.  Most recent are:  Patient Vitals for the past 24 hrs:   Temp Pulse Resp BP SpO2   21 1400 98 °F (36.7 °C) 70 18 (!) 119/46 96 %   21 1345  73 20 (!) 127/50 94 %   21 1340  72 18 (!) 129/53 95 %   21 1335  74 17 (!) 134/49 98 %   21 1330  74 18 (!) 132/50 99 %   21 1325  72 17 (!) 133/47 94 %   21 1322 98.8 °F (37.1 °C) 73 14 (!) 134/48 98 %   21 1000 98.2 °F (36.8 °C) 64 14 (!) 143/64 97 %   21 0755 97.8 °F (36.6 °C) 83 18 135/70 97 %   21 0352 98 °F (36.7 °C) 73 20 (!) 155/63 99 %   21 0140  71  (!) 171/68    21 0033 98 °F (36.7 °C) 68 20 (!) 173/65 100 %   21 6845  64 18 (!) 159/57 97 %   07/01/21 2300  62 21 (!) 162/50 99 %   07/01/21 2245  61 22 (!) 158/45 99 %   07/01/21 2230  60 20 (!) 149/46 98 %   07/01/21 2215  60 19 (!) 163/52 100 %   07/01/21 2200  (!) 58 17 (!) 151/116 99 %   07/01/21 2145  (!) 59 20 (!) 141/62 98 %   07/01/21 2130  62 16 (!) 167/62 95 %   07/01/21 2115  65 22 (!) 160/75 96 %   07/01/21 2100  60 20 (!) 141/83 96 %   07/01/21 2045  65 18 (!) 152/62 95 %   07/01/21 2030  62 21 (!) 149/57 97 %   07/01/21 2015  61 20 (!) 149/55 96 %   07/01/21 2000  62 20 138/77 95 %   07/01/21 1945  63 18 (!) 145/64 97 %   07/01/21 1930  61 22 (!) 154/61 96 %   07/01/21 1915  68 15 (!) 171/61    07/01/21 1900  66 17 (!) 158/82 94 %   07/01/21 1854    (!) 150/62    07/01/21 1830  70 20 (!) 190/88 97 %   07/01/21 1745  66 19 (!) 178/72    07/01/21 1512     98 %       Intake/Output Summary (Last 24 hours) at 7/2/2021 1413  Last data filed at 7/2/2021 1250  Gross per 24 hour   Intake 1250 ml   Output 600 ml   Net 650 ml        I had a face to face encounter and independently examined this patient on 7/2/2021, as outlined below:  PHYSICAL EXAM:  General: WD, WN. Alert, cooperative, no acute distress    EENT:  EOMI. Anicteric sclerae. MMM  Resp:  CTA bilaterally, no wheezing or rales. No accessory muscle use  CV:  Regular  rhythm,  No edema  GI:  Soft, Non distended, Non tender. +Bowel sounds  Neurologic:  Alert and oriented X 3, normal speech,   Psych:   Good insight. Not anxious nor agitated  Skin:  No rashes.   No jaundice    Reviewed most current lab test results and cultures  YES  Reviewed most current radiology test results   YES  Review and summation of old records today    NO  Reviewed patient's current orders and MAR    YES  PMH/SH reviewed - no change compared to H&P  ________________________________________________________________________  Care Plan discussed with:    Comments   Patient y    Family      RN y    Care Manager     Consultant Multidiciplinary team rounds were held today with , nursing, pharmacist and clinical coordinator. Patient's plan of care was discussed; medications were reviewed and discharge planning was addressed. ________________________________________________________________________  Total NON critical care TIME:  35  Minutes    Total CRITICAL CARE TIME Spent:   Minutes non procedure based      Comments   >50% of visit spent in counseling and coordination of care     ________________________________________________________________________  Erin Chinchilla MD     Procedures: see electronic medical records for all procedures/Xrays and details which were not copied into this note but were reviewed prior to creation of Plan. LABS:  I reviewed today's most current labs and imaging studies. Pertinent labs include:  Recent Labs     07/02/21  0522 07/01/21  1449   WBC 16.1* 18.7*   HGB 14.5 13.5   HCT 43.7 41.6    301     Recent Labs     07/02/21  0522 07/01/21  1449   * 135*   K 3.6 3.6   CL 96* 97   CO2 30 33*   GLU 97 86   BUN 13 14   CREA 0.61 0.68   CA 9.6 8.5   MG  --  1.8   ALB 3.3* 3.0*   TBILI 0.6 0.6   ALT 22 23   INR  --  1.1       Signed:  Erin Chinchilla MD

## 2021-07-03 LAB
ANION GAP SERPL CALC-SCNC: 5 MMOL/L (ref 5–15)
BASOPHILS # BLD: 0 K/UL (ref 0–0.1)
BASOPHILS NFR BLD: 0 % (ref 0–1)
BUN SERPL-MCNC: 19 MG/DL (ref 6–20)
BUN/CREAT SERPL: 17 (ref 12–20)
CALCIUM SERPL-MCNC: 8.9 MG/DL (ref 8.5–10.1)
CHLORIDE SERPL-SCNC: 98 MMOL/L (ref 97–108)
CO2 SERPL-SCNC: 31 MMOL/L (ref 21–32)
CREAT SERPL-MCNC: 1.12 MG/DL (ref 0.55–1.02)
DIFFERENTIAL METHOD BLD: ABNORMAL
EOSINOPHIL # BLD: 0.3 K/UL (ref 0–0.4)
EOSINOPHIL NFR BLD: 2 % (ref 0–7)
ERYTHROCYTE [DISTWIDTH] IN BLOOD BY AUTOMATED COUNT: 13.9 % (ref 11.5–14.5)
GLUCOSE SERPL-MCNC: 99 MG/DL (ref 65–100)
HCT VFR BLD AUTO: 36.6 % (ref 35–47)
HGB BLD-MCNC: 11.5 G/DL (ref 11.5–16)
IMM GRANULOCYTES # BLD AUTO: 0.1 K/UL (ref 0–0.04)
IMM GRANULOCYTES NFR BLD AUTO: 1 % (ref 0–0.5)
LYMPHOCYTES # BLD: 0.7 K/UL (ref 0.8–3.5)
LYMPHOCYTES NFR BLD: 5 % (ref 12–49)
MCH RBC QN AUTO: 31.5 PG (ref 26–34)
MCHC RBC AUTO-ENTMCNC: 31.4 G/DL (ref 30–36.5)
MCV RBC AUTO: 100.3 FL (ref 80–99)
MONOCYTES # BLD: 1 K/UL (ref 0–1)
MONOCYTES NFR BLD: 7 % (ref 5–13)
NEUTS SEG # BLD: 12 K/UL (ref 1.8–8)
NEUTS SEG NFR BLD: 85 % (ref 32–75)
NRBC # BLD: 0 K/UL (ref 0–0.01)
NRBC BLD-RTO: 0 PER 100 WBC
PLATELET # BLD AUTO: 264 K/UL (ref 150–400)
PMV BLD AUTO: 9.5 FL (ref 8.9–12.9)
POTASSIUM SERPL-SCNC: 4.3 MMOL/L (ref 3.5–5.1)
RBC # BLD AUTO: 3.65 M/UL (ref 3.8–5.2)
RBC MORPH BLD: ABNORMAL
SODIUM SERPL-SCNC: 134 MMOL/L (ref 136–145)
WBC # BLD AUTO: 14.1 K/UL (ref 3.6–11)

## 2021-07-03 PROCEDURE — 74011250636 HC RX REV CODE- 250/636: Performed by: ORTHOPAEDIC SURGERY

## 2021-07-03 PROCEDURE — 74011250637 HC RX REV CODE- 250/637: Performed by: ORTHOPAEDIC SURGERY

## 2021-07-03 PROCEDURE — 97530 THERAPEUTIC ACTIVITIES: CPT

## 2021-07-03 PROCEDURE — 97161 PT EVAL LOW COMPLEX 20 MIN: CPT

## 2021-07-03 PROCEDURE — 36415 COLL VENOUS BLD VENIPUNCTURE: CPT

## 2021-07-03 PROCEDURE — 80048 BASIC METABOLIC PNL TOTAL CA: CPT

## 2021-07-03 PROCEDURE — 85025 COMPLETE CBC W/AUTO DIFF WBC: CPT

## 2021-07-03 PROCEDURE — 97535 SELF CARE MNGMENT TRAINING: CPT

## 2021-07-03 PROCEDURE — 97165 OT EVAL LOW COMPLEX 30 MIN: CPT

## 2021-07-03 PROCEDURE — 51798 US URINE CAPACITY MEASURE: CPT

## 2021-07-03 PROCEDURE — 94760 N-INVAS EAR/PLS OXIMETRY 1: CPT

## 2021-07-03 PROCEDURE — 77010033678 HC OXYGEN DAILY

## 2021-07-03 PROCEDURE — 65660000000 HC RM CCU STEPDOWN

## 2021-07-03 PROCEDURE — 74011000250 HC RX REV CODE- 250: Performed by: ORTHOPAEDIC SURGERY

## 2021-07-03 RX ADMIN — ACETAMINOPHEN 650 MG: 325 TABLET ORAL at 09:12

## 2021-07-03 RX ADMIN — PHENYTOIN SODIUM 200 MG: 100 CAPSULE ORAL at 09:12

## 2021-07-03 RX ADMIN — KETOROLAC TROMETHAMINE 15 MG: 30 INJECTION, SOLUTION INTRAMUSCULAR; INTRAVENOUS at 05:07

## 2021-07-03 RX ADMIN — PHENYTOIN SODIUM 200 MG: 100 CAPSULE ORAL at 17:27

## 2021-07-03 RX ADMIN — TRAMADOL HYDROCHLORIDE 50 MG: 50 TABLET, FILM COATED ORAL at 09:12

## 2021-07-03 RX ADMIN — CITALOPRAM HYDROBROMIDE 40 MG: 20 TABLET ORAL at 09:12

## 2021-07-03 RX ADMIN — Medication 1 TABLET: at 11:37

## 2021-07-03 RX ADMIN — ACETAMINOPHEN 650 MG: 325 TABLET ORAL at 05:07

## 2021-07-03 RX ADMIN — Medication 10 ML: at 11:38

## 2021-07-03 RX ADMIN — Medication 10 ML: at 21:14

## 2021-07-03 RX ADMIN — ATORVASTATIN CALCIUM 20 MG: 20 TABLET, FILM COATED ORAL at 09:12

## 2021-07-03 RX ADMIN — ASPIRIN 81 MG: 81 TABLET, COATED ORAL at 17:27

## 2021-07-03 RX ADMIN — METOPROLOL SUCCINATE 50 MG: 50 TABLET, EXTENDED RELEASE ORAL at 09:11

## 2021-07-03 RX ADMIN — KETOROLAC TROMETHAMINE 15 MG: 30 INJECTION, SOLUTION INTRAMUSCULAR; INTRAVENOUS at 11:37

## 2021-07-03 RX ADMIN — ACETAMINOPHEN 650 MG: 325 TABLET ORAL at 15:16

## 2021-07-03 RX ADMIN — Medication 1 AMPULE: at 09:13

## 2021-07-03 RX ADMIN — TRAMADOL HYDROCHLORIDE 50 MG: 50 TABLET, FILM COATED ORAL at 21:12

## 2021-07-03 RX ADMIN — CEFAZOLIN SODIUM 2 G: 1 INJECTION, POWDER, FOR SOLUTION INTRAMUSCULAR; INTRAVENOUS at 05:07

## 2021-07-03 RX ADMIN — ACETAMINOPHEN 650 MG: 325 TABLET ORAL at 21:12

## 2021-07-03 RX ADMIN — FERROUS SULFATE TAB 325 MG (65 MG ELEMENTAL FE) 325 MG: 325 (65 FE) TAB at 09:12

## 2021-07-03 RX ADMIN — Medication 10 ML: at 05:07

## 2021-07-03 RX ADMIN — DILTIAZEM HYDROCHLORIDE 240 MG: 240 CAPSULE, COATED, EXTENDED RELEASE ORAL at 09:12

## 2021-07-03 RX ADMIN — Medication 1 TABLET: at 17:28

## 2021-07-03 RX ADMIN — FERROUS SULFATE TAB 325 MG (65 MG ELEMENTAL FE) 325 MG: 325 (65 FE) TAB at 17:27

## 2021-07-03 RX ADMIN — Medication 1 TABLET: at 09:11

## 2021-07-03 RX ADMIN — ASPIRIN 81 MG: 81 TABLET, COATED ORAL at 09:11

## 2021-07-03 RX ADMIN — POLYETHYLENE GLYCOL 3350 17 G: 17 POWDER, FOR SOLUTION ORAL at 09:12

## 2021-07-03 RX ADMIN — TRAMADOL HYDROCHLORIDE 50 MG: 50 TABLET, FILM COATED ORAL at 15:17

## 2021-07-03 RX ADMIN — Medication 1 AMPULE: at 21:13

## 2021-07-03 NOTE — PROGRESS NOTES
Hospitalist Progress Note    NAME: Jenny Gallardo   :  1940   MRN:  [id number]       Assessment / Plan:    Ground-level fall andAcute left femoral neck fracture POA  Ortho consultation  s/p left hip hemiarthroplasty     Patient has diarrhea over the last few days  Leukocytosis  Follow-up stool study       Stool softer that she was on held   Hypertension continue home medications  Chronic atrial fibrillation not on anticoagulation seen in chart. Continue  rate control medications  History of seizure continue with seizure medication        30.0 - 39.9 Obese / Body mass index is 35.43 kg/m².     Estimated discharge date:   Barriers:     Code status: Full  Prophylaxis: Lovenox and Per Ortho  Recommended Disposition: SNF/LTC         Subjective:     Chief Complaint / Reason for Physician Visit  . Discussed with RN events overnight. complaining from hip pain     Review of Systems:  Symptom Y/N Comments  Symptom Y/N Comments   Fever/Chills n   Chest Pain n    Poor Appetite    Edema     Cough    Abdominal Pain     Sputum    Joint Pain y    SOB/STEIN n   Pruritis/Rash     Nausea/vomit    Tolerating PT/OT     Diarrhea    Tolerating Diet y    Constipation    Other       Could NOT obtain due to:      Objective:     VITALS:   Last 24hrs VS reviewed since prior progress note.  Most recent are:  Patient Vitals for the past 24 hrs:   Temp Pulse Resp BP SpO2   21 0714 97.7 °F (36.5 °C) 69 16 113/67 95 %   21 0320 97.9 °F (36.6 °C) 67 16 139/67 94 %   21 1950 97.7 °F (36.5 °C) 63 18 109/70 95 %   21 1853 98.2 °F (36.8 °C) (!) 59 18 (!) 109/53 94 %   21 1753 97.9 °F (36.6 °C) (!) 59 18 (!) 112/57 95 %   21 1653 97.3 °F (36.3 °C) 68 18 (!) 115/58 92 %   21 1553 97.3 °F (36.3 °C) 67 18 134/65 90 %   21 1530  64 15 (!) 109/45 94 %   21 1515  65 14 (!) 112/46 94 %   21 1502  67 15 (!) 113/46 93 %   21 1500  66 15 (!) 102/49 92 %   21 1445 97.4 °F (36.3 °C) 68 14 (!) 118/42 95 %   07/02/21 1430  70 15 (!) 118/47 97 %   07/02/21 1415  69 18 (!) 116/45 97 %   07/02/21 1400  70 18 (!) 119/46 96 %   07/02/21 1345  73 20 (!) 127/50 94 %   07/02/21 1340  72 18 (!) 129/53 95 %   07/02/21 1335  74 17 (!) 134/49 98 %   07/02/21 1330  74 18 (!) 132/50 99 %   07/02/21 1325  72 17 (!) 133/47 94 %   07/02/21 1322 98.8 °F (37.1 °C) 73 14 (!) 134/48 98 %   07/02/21 1000 98.2 °F (36.8 °C) 64 14 (!) 143/64 97 %       Intake/Output Summary (Last 24 hours) at 7/3/2021 0926  Last data filed at 7/3/2021 0800  Gross per 24 hour   Intake 796.25 ml   Output 1100 ml   Net -303.75 ml        I had a face to face encounter and independently examined this patient on 7/3/2021, as outlined below:  PHYSICAL EXAM:  General: WD, WN. Alert, cooperative, no acute distress    EENT:  EOMI. Anicteric sclerae. MMM  Resp:  CTA bilaterally, no wheezing or rales. No accessory muscle use  CV:  Regular  rhythm,  No edema  GI:  Soft, Non distended, Non tender. +Bowel sounds  Neurologic:  Alert and oriented X 3, normal speech,   Psych:   Good insight. Not anxious nor agitated  Skin:  No rashes. No jaundice    Reviewed most current lab test results and cultures  YES  Reviewed most current radiology test results   YES  Review and summation of old records today    NO  Reviewed patient's current orders and MAR    YES  PMH/SH reviewed - no change compared to H&P  ________________________________________________________________________  Care Plan discussed with:    Comments   Patient y    Family      RN y    Care Manager     Consultant                        [facility] rounds were held today with , nursing, pharmacist and clinical coordinator. Patient's plan of care was discussed; medications were reviewed and discharge planning was addressed.      ________________________________________________________________________  Total NON critical care TIME:  35   Minutes    Total CRITICAL CARE TIME Spent:   Minutes non procedure based      Comments   >50% of visit spent in counseling and coordination of care y    ________________________________________________________________________  Erin Chinchilla MD     Procedures: see electronic medical records for all procedures/Xrays and details which were not copied into this note but were reviewed prior to creation of Plan. LABS:  I reviewed today's most current labs and imaging studies. Pertinent labs include:  Recent Labs     07/03/21  0335 07/02/21  0522 07/01/21  1449   WBC 14.1* 16.1* 18.7*   HGB 11.5 14.5 13.5   HCT 36.6 43.7 41.6    268 301     Recent Labs     07/03/21  0335 07/02/21  0522 07/01/21  1449   * 131* 135*   K 4.3 3.6 3.6   CL 98 96* 97   CO2 31 30 33*   GLU 99 97 86   BUN 19 13 14   CREA 1.12* 0.61 0.68   CA 8.9 9.6 8.5   MG  --   --  1.8   ALB  --  3.3* 3.0*   TBILI  --  0.6 0.6   ALT  --  22 23   INR  --   --  1.1       Signed:  Erin Chinchilla MD

## 2021-07-03 NOTE — PROGRESS NOTES
Problem: Self Care Deficits Care Plan (Adult)  Goal: *Acute Goals and Plan of Care (Insert Text)  Description:   FUNCTIONAL STATUS PRIOR TO ADMISSION: Patient was modified independent using a rolling walker for functional mobility. Patient assist for basic and instrumental ADLs. Patient required assist for LB ADLs and bathing. Patient may be unreliable historian secondary to confusion. HOME SUPPORT: The patient lived with daughter and son-in-law. Occupational Therapy Goals  Initiated 7/3/2021  1. Patient will perform grooming with supervision/set-up within 7 day(s). 2.  Patient will perform upper body dressing with supervision/set-up within 7 day(s). 3.  Patient will perform lower body dressing using AE PRN with supervision/set-up within 7 day(s). 4.  Patient will perform toilet transfers with contact guard assist within 7 day(s). 5.  Patient will perform all aspects of toileting with minimal assistance/contact guard assist within 7 day(s). Outcome: Not Met   OCCUPATIONAL THERAPY EVALUATION  Patient: Joel Hidalgo (69 y.o. female)  Date: 7/3/2021  Primary Diagnosis: Femoral neck fracture ([address] [S72.009A]  Procedure(s) (LRB):  LEFT HIP HEMIARTHROPLASTY (Left) 1 Day Post-Op   Precautions: Fall, WBAT    ASSESSMENT  Based on the objective data described below, the patient presents with decreased independence in self-care and functional mobility secondary to [facility] resulting in POD 1 L hip hemiarthroplasty (anterior, WBAT), 8/10 L hip pain, intermittent confusion/drowsiness, general weakness, impaired balance, and poor activity tolerance. Patient provided PLOF but questionable historian secondary to intermittent confusion (reported having caregiver but then talked about her daughter assisting her).  Patient is functioning below her self-reported baseline for self-care and functional mobility, now completing self-care with set-up/supervision to total assist and functional mobility with CGA to mod assist x2 using RW. Patient received semisupine in bed on 2L O2 and agreeable for therapy with encouragement. Patient required additional time and max encouragement to complete all tasks this session. At times, patient was speaking coherently but would begin speaking non-coherently throughout the session. Patient was left sitting in chair with all needs met and chair alarmed. Patient would benefit from skilled OT services during acute hospital stay. Anticipate patient will need SNF rehab at discharge, pending progress. Current Level of Function (ADLs/self-care): set-up/supervision to total assist for self-care, CGA to mod assist x2 for functional mobility/transfers using RW    Functional Outcome Measure: The patient scored 30 on the Barthel Index outcome measure which is indicative of 70% functional impairment in ADLs. Other factors to consider for discharge: fall risk, L hip hemiarthroplasty, pain      PLAN :  Recommendations and Planned Interventions: self care training, functional mobility training, therapeutic exercise, balance training, therapeutic activities, endurance activities, patient education, home safety training, and family training/education    Frequency/Duration: Patient will be followed by occupational therapy 5 times a week to address goals. Recommendation for discharge: (in order for the patient to meet his/her long term goals)  Therapy up to 5 days/week in SNF setting    This discharge recommendation:  Has not yet been discussed the attending provider and/or case management    IF patient discharges home will need the following DME: TBD pending progress       SUBJECTIVE:   Patient stated \"My daughter helps me.     OBJECTIVE DATA SUMMARY:   HISTORY:   Past Medical History:   Diagnosis Date    Arthritis     Bladder disorder     Cancer [facility])     breast cancer    Gastrointestinal disorder     acid reflux    GERD (gastroesophageal reflux disease)     High cholesterol     Hypertension     Other ill-defined conditions(799.89)     pneumonia    Psychiatric disorder     depression    [facility]     Stroke ([facility]     Thromboembolus ([facility]      Past Surgical History:   Procedure Laterality Date    HX CATARACT REMOVAL      bilateral    HX  SECTION      x 3    HX CHOLECYSTECTOMY      HX ORTHOPAEDIC      left arm, left leg, right wrist    HX ORTHOPAEDIC  13    ELBOW OPEN REDUCTION INTERNAL FIXATION (Right    HX OTHER SURGICAL      Brain surgery s/p CVA    HX OTHER SURGICAL      Bladder stimulator 12    HX OTHER SURGICAL      gastric bypass    RI BREAST SURGERY PROCEDURE UNLISTED      leftt breast lumpectomy     Expanded or extensive additional review of patient history:     Home Situation  Home Environment: Private residence  # Steps to Enter: 0  Wheelchair Ramp: Yes  One/Two Story Residence: One story  Living Alone: No  Support Systems: Child(marcelina)  Patient Expects to be Discharged to[de-identified] [employer]  Current DME Used/Available at Home: Commode, bedside, Shower chair, Walker, rolling, Wheelchair  Tub or Shower Type: Shower    Hand dominance: Right    EXAMINATION OF PERFORMANCE DEFICITS:  Cognitive/Behavioral Status:  Neurologic State: Alert  Orientation Level: Oriented to person;Oriented to place; Disoriented to situation;Disoriented to time  Cognition: Decreased attention/concentration; Follows commands; Impaired decision making  Perception: Appears intact  Perseveration: Perseverates during conversation  Safety/Judgement: Fall prevention;Decreased insight into deficits    Hearing: Auditory  Auditory Impairment: Hard of hearing, bilateral  Hearing Aids/Status: Does not own    Vision/Perceptual:    Acuity:  (unable to formally assess)      Range of Motion:  AROM: Generally decreased, functional    Strength:  Strength: Generally decreased, functional    Coordination:  Coordination: Generally decreased, functional  Fine Motor Skills-Upper: Left Intact; Right Intact    Gross Motor Skills-Upper: Left Intact; Right Intact    Tone & Sensation:  Tone: Normal    Balance:  Sitting: Intact  Sitting - Static: Good (unsupported)  Sitting - Dynamic: Not tested  Standing: Impaired  Standing - Static: Constant support;Good  Standing - Dynamic : Constant support;Good;Fair    Functional Mobility and Transfers for ADLs:  Bed Mobility:  Supine to Sit: Moderate assistance; Additional time;Assist x2  Scooting: Moderate assistance; Additional time;Assist x2    Transfers:  Sit to Stand: Minimum assistance; Additional time;Assist x2  Stand to Sit: Contact guard assistance  Bed to Chair: Minimum assistance;Assist x2 (verbal cues for sequencing and RW management)    ADL Assessment:  Feeding: Setup;Supervision  Oral Facial Hygiene/Grooming: Minimum assistance  Bathing: Maximum assistance  Upper Body Dressing: Minimum assistance  Lower Body Dressing: Total assistance  Toileting: Maximum assistance     ADL Intervention and task modifications:    Grooming  Position Performed: Seated edge of bed  Washing Face: Contact guard assistance  Brushing/Combing Hair: Minimum assistance  Cues: Physical assistance; Tactile cues provided;Verbal cues provided    Lower Body Dressing Assistance  Socks: Total assistance (dependent)    Cognitive Retraining  Safety/Judgement: Fall prevention;Decreased insight into deficits    Functional Measure:  Barthel Index:    Bathin  Bladder: 0  Bowels: 10  Groomin  Dressin  Feeding: 10  Mobility: 0  Stairs: 0  Toilet Use: 5  Transfer (Bed to Chair and Back): 5  Total: 30/100        The Barthel ADL Index: Guidelines  1. The index should be used as a record of what a patient does, not as a record of what a patient could do. 2. The main aim is to establish degree of independence from any help, physical or verbal, however minor and for whatever reason. 3. The need for supervision renders the patient not independent. 4. A patient's performance should be established using the best available evidence.  Asking the patient, friends/relatives and nurses are the usual sources, but direct observation and common sense are also important. However direct testing is not needed. 5. Usually the patient's performance over the preceding 24-48 hours is important, but occasionally longer periods will be relevant. 6. Middle categories imply that the patient supplies over 50 per cent of the effort. 7. Use of aids to be independent is allowed. Noman Krishnamurthy., Barthel DYolaW. (1050). Functional evaluation: the Barthel Index. [facility] (14)2. Marquise Clayton natalie YANIRA Trujillo, Moreno Anaya., Caio Darby., Deja, [address] [facility] (1999). Measuring the change indisability after inpatient rehabilitation; comparison of the responsiveness of the Barthel Index and Functional [place] Measure. Journal of Neurology, Neurosurgery, and Psychiatry, 66(4), 428-650. DELON Fatima, JUSTYNA Joshi, & Toya Felix M.A. (2004.) Assessment of post-stroke quality of life in cost-effectiveness studies: The usefulness of the Barthel Index and the EuroQoL-5D. Quality of Life Research, 13, 785-94     Based on the above components, the patient evaluation is determined to be of the following complexity level: MEDIUM  Pain Rating:  Patient c/o 8/10 L hip pain. Activity Tolerance:   Fair, desaturates with exertion and requires oxygen, and requires rest breaks      After treatment patient left in no apparent distress:    Sitting in chair, Call bell within reach, and Bed / chair alarm activated    COMMUNICATION/EDUCATION:   The patients plan of care was discussed with: Physical therapist and Registered nurse.      Thank you for this referral.  Mike Terrell, OTR/L  Time Calculation: 39 mins

## 2021-07-03 NOTE — PROGRESS NOTES
Problem: Falls - Risk of  Goal: *Absence of Falls  Description: Document Josi Leiva Fall Risk and appropriate interventions in the flowsheet.   Outcome: Progressing Towards Goal  Note: Fall Risk Interventions:  Mobility Interventions: Assess mobility with egress test, Communicate number of staff needed for ambulation/transfer, Bed/chair exit alarm, OT consult for ADLs, Patient to call before getting OOB, PT Consult for mobility concerns, PT Consult for assist device competence, Strengthening exercises (ROM-active/passive), Utilize walker, cane, or other assistive device, Utilize gait belt for transfers/ambulation    Mentation Interventions: Door open when patient unattended, Bed/chair exit alarm, Adequate sleep, hydration, pain control, Evaluate medications/consider consulting pharmacy, Eyeglasses and hearing aids, Familiar objects from home, Family/sitter at bedside, Gait belt with transfers/ambulation, Increase mobility, More frequent rounding, Reorient patient, Update white board, Toileting rounds, Room close to nurse's station    Medication Interventions: Assess postural VS orthostatic hypotension, Bed/chair exit alarm, Evaluate medications/consider consulting pharmacy, Patient to call before getting OOB, Teach patient to arise slowly, Utilize gait belt for transfers/ambulation    Elimination Interventions: Call light in reach, Elevated toilet seat, Stay With Me (per policy), Toileting schedule/hourly rounds, Toilet paper/wipes in reach, Patient to call for help with toileting needs, Bed/chair exit alarm    History of Falls Interventions: Bed/chair exit alarm, Consult care management for discharge planning, Door open when patient unattended, Evaluate medications/consider consulting pharmacy, Room close to nurse's station, Utilize gait belt for transfer/ambulation, Assess for delayed presentation/identification of injury for 48 hrs (comment for end date), Vital signs minimum Q4HRs X 24 hrs (comment for end date)         Problem: Patient Education: Go to Patient Education Activity  Goal: Patient/Family Education  Outcome: Progressing Towards Goal     Problem: Pressure Injury - Risk of  Goal: *Prevention of pressure injury  Description: Document Greg Scale and appropriate interventions in the flowsheet. Outcome: Progressing Towards Goal  Note: Pressure Injury Interventions:  Sensory Interventions: Assess changes in LOC, Assess need for specialty bed, Chair cushion, Check visual cues for pain, Avoid rigorous massage over bony prominences, Discuss PT/OT consult with provider, Float heels, Keep linens dry and wrinkle-free, Minimize linen layers, Maintain/enhance activity level, Pad between skin to skin, Monitor skin under medical devices, Pressure redistribution bed/mattress (bed type)    Moisture Interventions: Absorbent underpads, Assess need for specialty bed, Check for incontinence Q2 hours and as needed, Apply protective barrier, creams and emollients, Contain wound drainage, Limit adult briefs, Maintain skin hydration (lotion/cream), Minimize layers, Moisture barrier, Offer toileting Q_hr    Activity Interventions: Assess need for specialty bed, Chair cushion, Increase time out of bed, PT/OT evaluation, Pressure redistribution bed/mattress(bed type)    Mobility Interventions: Assess need for specialty bed, Chair cushion, Float heels, HOB 30 degrees or less, Pressure redistribution bed/mattress (bed type), PT/OT evaluation, Turn and reposition approx.  every two hours(pillow and wedges)    Nutrition Interventions: Document food/fluid/supplement intake, Discuss nutritional consult with provider, Offer support with meals,snacks and hydration    Friction and Shear Interventions: Apply protective barrier, creams and emollients, Feet elevated on foot rest, Foam dressings/transparent film/skin sealants, HOB 30 degrees or less, Lift sheet, Lift team/patient mobility team, Minimize layers                Problem: Patient Education: Go to Patient Education Activity  Goal: Patient/Family Education  Outcome: Progressing Towards Goal

## 2021-07-03 NOTE — PROGRESS NOTES
Transition of Care Plan:    RUR:13%   Disposition:SNF-[facility](referral is pending and will need auth started)  Follow up appointments:PCP and specialist  DME needed:No DME needed  Transportation at [address] or means to access home:   Dtr     IM Medicare letter:2nd IMM letter   Caregiver Contact:Rosa Elena Mendoza dtr [phone]  Discharge Caregiver contacted prior to discharge? Reason for Admission:  13%                     RUR Score: Fall                 Plan for utilizing home health:      Hx of using [address]    PCP: First and Last name:  Ricco Springer MD     Name of Practice:    Are you a current patient: Yes/No: Yes   Approximate date of last visit:  A week ago   Can you participate in a virtual visit with your PCP:  NO                    Current Advanced Directive/Advance Care Plan: Full Code      Healthcare Decision Maker:   Click here to complete [address] including selection of the Healthcare Decision Maker Relationship (ie \"Primary\")             Primary Decision MakerTommi Her - Child - [phone]    Primary Decision Maker: Suzanne Bailey - Child - [phone]    Primary Decision Maker: Jules Souaz - Child - [phone] [facility] (ACP) Conversation      Date of Conversation: 7/3/2021  Conducted with: Patient with [address]: Next of Kin by law (only applies in absence of a Healthcare Power of  or Legal Guardian)    Healthcare Decision Maker:     Primary Decision Maker: Argelia Freedman - Child - [phone]    Primary Decision Maker: Medina Armstrong Child - [phone]    Primary Decision Maker: Jules Souza - Child - [phone]  Click here to [address] including selection of the Healthcare Decision Maker Relationship (ie \"Primary\")  Today we documented Decision Maker(s).  The patient will provide ACP documents. Content/Action Overview: Has ACP document(s) NOT on file - requested patient to provide  Reviewed DNR/DNI and patient elects Full Code (Attempt Resuscitation)         Length of Voluntary ACP Conversation in minutes:  <16 minutes (Non-Billable)    Arun Weaver       Transition of Care Plan:                           Pt is a [de-identified] yo female who was admitted to [facility] with a dx of a fall. CM introduce self, explain role and confirmed demographics with pt and pt's dtr. Pt lives with her dtr in an one story home with a ramp to enter. She has a wheelchair, rolling walker, a shower chair, and a neb. Pt has a hx of using home health services with [facility] and pt's dtr is agreeable to reusing [facility] and being admitted to inpatient rehab, [facility]. No hx of outpatient rehab or being admitted to SNF. Pt needs assistance with bathing which the pt's dtr pays someone. Pt is independent in her dressing, toileting and feeding. Pt's dtr assist pt with her IADLs. Pt's dtr transport pt to her medical appointments. Pt uses [facility] for mail order medications and [facility] [employer] in [place]. CM spoke to pt's dtr regarding the recommendation is once pt is medically stable for SNF placement. Pt is agreeable and would like referral to be sent to [facility]. CM sent referral via [facility]. Pt will need auth for placement. At the time of d/c pt's family or [facility] will transport. CM will continue to follow and assist with d/c planning. Care Management Interventions  PCP Verified by CM: Yes [facility])  Mode of Transport at Discharge:  Other (see comment) ([facility] Vs. Family)  Transition of Care Consult (CM Consult): Discharge Planning, SNF (Pt lives alone but the recommendation is for SNF)  Discharge Durable Medical Equipment: No (Wheelchair, Rolling Walker, Shower Chair, and [employer])  Physical Therapy Consult: Yes  Occupational Therapy Consult: Yes  Speech Therapy Consult: No  Current Support Network: Family Lives Nearby, Other (Family is supportive and involved)  Confirm Follow Up Transport: Family (Pt's dtr transport pt to her medical appointments)  The Plan for Transition of Care is Related to the Following Treatment Goals : SNF  The Patient and/or Patient Representative was Provided with a Choice of Provider and Agrees with the Discharge Plan?: Yes  Name of the Patient Representative Who was Provided with a Choice of Provider and Agrees with the Discharge Plan: Pt's dtr  Freedom of Choice List was Provided with Basic Dialogue that Supports the Patient's Individualized Plan of Care/Goals, Treatment Preferences and Shares the Quality Data Associated with the Providers?: Yes  Discharge Location  Discharge Placement: Skilled nursing facility    [place], [place].   Care Manager [facility]  [phone]

## 2021-07-03 NOTE — PROGRESS NOTES
Problem: Mobility Impaired (Adult and Pediatric)  Goal: *Acute Goals and Plan of Care (Insert Text)  Description: FUNCTIONAL STATUS PRIOR TO ADMISSION: Per pt she ambulated using a rolling walker, but when asked more significant questions pt gave inconsistent answers, so not sure if pt used a rolling walker or rollator. HOME SUPPORT PRIOR TO ADMISSION: Pt lives with her daughter and son in law and reports she has a caregiver come in the home to assist her. Unclear how many days the caregiver comes a week or how  many hours she stays each visit. Pt endorses receiving assistance for bathing and dressing. Physical Therapy Goals  Initiated 7/3/2021  1. Patient will move from supine to sit and sit to supine , scoot up and down, and roll side to side in bed with minimal assistance/contact guard assist within 7 day(s). 2.  Patient will transfer from bed to chair and chair to bed with minimal assistance/contact guard assist using the least restrictive device within 7 day(s). 3.  Patient will perform sit to stand with minimal assistance/contact guard assist within 7 day(s). 4.  Patient will ambulate with minimal assistance/contact guard assist for 50 feet with the least restrictive device within 7 day(s). Outcome: Progressing Towards Goal   PHYSICAL THERAPY EVALUATION  Patient: Yanna Britt (89 y.o. female)  Date: 7/3/2021  Primary Diagnosis: Femoral neck fracture ([facility] [address] [S72.009A]  Procedure(s) (LRB):  LEFT HIP HEMIARTHROPLASTY (Left) 1 Day Post-Op   Precautions:  Fall, WBAT    ASSESSMENT  Based on the objective data described below, the patient presents with expected post op LLE pain, impaired mobility, lethargy, confusion, impaired standing balance, difficulty sequencing activity, and decreased activity tolerance. Pt on 2L of O2 and unable to wean to RA this session. Pt received supine in bed with intermittent confusion, speaking coherently at times and non-coherently at other times.   She was very fearful of the pain, stating what she couldn't do before attempting to try. Overall pt requires min/mod a x 2 for all mobility. She demonstrates good sitting balance eob and good to fair standing balance with RW. Pt able to transfer bed to bedside chair, taking a few small steps to achieve, requiring constant verbal cuing for sequencing and manual assistance to manage the RW at times. Pt left sitting in bedside chair with all needs met. She will need rehab at discharge. Current Level of Function Impacting Discharge (mobility/balance): min/mod a x 2    Functional Outcome Measure: The patient scored 30/100 on the Barthel Index outcome measure which is indicative of 70% impaired function/adls      Other factors to consider for discharge: at home alone at times, functioning below her baseline     Patient will benefit from skilled therapy intervention to address the above noted impairments. PLAN :  Recommendations and Planned Interventions: bed mobility training, transfer training, gait training, therapeutic exercises, patient and family training/education, and therapeutic activities      Frequency/Duration: Patient will be followed by physical therapy:  daily to address goals. Recommendation for discharge: (in order for the patient to meet his/her long term goals)  Therapy up to 5 days/week in SNF setting    This discharge recommendation:  A follow-up discussion with the attending provider and/or case management is planned    IF patient discharges home will need the following DME: patient owns DME required for discharge         SUBJECTIVE:   Patient stated my daughter helps me with my shower.     OBJECTIVE DATA SUMMARY:   HISTORY:    Past Medical History:   Diagnosis Date    Arthritis     Bladder disorder     Cancer [facility])     breast cancer    Gastrointestinal disorder     acid reflux    GERD (gastroesophageal reflux disease)     High cholesterol     Hypertension     Other ill-defined conditions(799.89)     pneumonia    Psychiatric disorder     depression    Seizures (HCC)     Stroke ([facility] [address]     Thromboembolus ([facility] [address]      Past Surgical History:   Procedure Laterality Date    HX CATARACT REMOVAL      bilateral    HX  SECTION      x 3    HX CHOLECYSTECTOMY      HX ORTHOPAEDIC      left arm, left leg, right wrist    HX ORTHOPAEDIC  13    ELBOW OPEN REDUCTION INTERNAL FIXATION (Right    HX OTHER SURGICAL      Brain surgery s/p CVA    HX OTHER SURGICAL      Bladder stimulator 12    HX OTHER SURGICAL      gastric bypass    IL BREAST SURGERY PROCEDURE UNLISTED      leftt breast lumpectomy       Personal factors and/or comorbidities impacting plan of care: pain control, confusion    Home Situation  Home Environment: Private residence  # Steps to Enter: 0  Wheelchair Ramp: Yes  One/Two Story Residence: One story  Living Alone: No  Support Systems: Child(marcelina)  Patient Expects to be Discharged to[de-identified] [employer]  Current DME Used/Available at Home: Commode, bedside, Shower chair, Walker, rolling, Wheelchair  Tub or Shower Type: Shower    EXAMINATION/PRESENTATION/DECISION MAKING:   Critical Behavior:  Neurologic State: Drowsy, Eyes open spontaneously  Orientation Level: Oriented to person, Oriented to place, Oriented to situation, Disoriented to time  Cognition: Follows commands     Hearing: Auditory  Auditory Impairment: None  Hearing Aids/Status: Does not own  Range Of Motion:  AROM: Generally decreased, functional                       Strength:    Strength: Generally decreased, functional                    Tone & Sensation:   Tone: Normal          Coordination:  Coordination: Generally decreased, functional     Functional Mobility:  Bed Mobility:     Supine to Sit: Moderate assistance; Additional time;Assist x2     Scooting: Moderate assistance; Additional time;Assist x2  Transfers:  Sit to Stand: Minimum assistance; Additional time;Assist x2  Stand to Sit: Contact guard assistance  Stand Pivot Transfers: Minimum assistance; Additional time;Assist x2 (vc for sequencing and RW management)     Bed to Chair: Minimum assistance;Assist x2 (verbal cues for sequencing and RW management)              Balance:   Sitting: Intact  Sitting - Static: Good (unsupported)  Sitting - Dynamic: Not tested  Standing: Impaired  Standing - Static: Constant support;Good  Standing - Dynamic : Constant support;Good;Fair      Functional Measure:  Barthel Index:    Bathin  Bladder: 0  Bowels: 10  Groomin  Dressin  Feeding: 10  Mobility: 0  Stairs: 0  Toilet Use: 5  Transfer (Bed to Chair and Back): 5  Total: 30/100       The Barthel ADL Index: Guidelines  1. The index should be used as a record of what a patient does, not as a record of what a patient could do. 2. The main aim is to establish degree of independence from any help, physical or verbal, however minor and for whatever reason. 3. The need for supervision renders the patient not independent. 4. A patient's performance should be established using the best available evidence. Asking the patient, friends/relatives and nurses are the usual sources, but direct observation and common sense are also important. However direct testing is not needed. 5. Usually the patient's performance over the preceding 24-48 hours is important, but occasionally longer periods will be relevant. 6. Middle categories imply that the patient supplies over 50 per cent of the effort. 7. Use of aids to be independent is allowed. Karen Omalley., Barthel, DYolaW. (4694). Functional evaluation: the Barthel Index. [facility] (14)2. YANIRA Talamantes, Devi Bowels., Twila Owens., [place], [address] [place] [address] (). Measuring the change indisability after inpatient rehabilitation; comparison of the responsiveness of the Barthel Index and Functional Pollock Measure. Journal of Neurology, Neurosurgery, and Psychiatry, 66(4), 915-772.   DELON Ortiz, Jez Stoddard, JUSTYNA, & Aida Spence M.A. (2004.) Assessment of post-stroke quality of life in cost-effectiveness studies: The usefulness of the Barthel Index and the EuroQoL-5D. Quality of Life Research, 15, 446-35           Physical Therapy Evaluation Charge Determination   History Examination Presentation Decision-Making   LOW Complexity : Zero comorbidities / personal factors that will impact the outcome / POC LOW Complexity : 1-2 Standardized tests and measures addressing body structure, function, activity limitation and / or participation in recreation  MEDIUM Complexity : Evolving with changing characteristics  LOW Complexity : FOTO score of       Based on the above components, the patient evaluation is determined to be of the following complexity level: LOW     Pain Rating:  LLE, not rated     Activity Tolerance:   Fair and requires rest breaks    After treatment patient left in no apparent distress:   Sitting in chair, Call bell within reach, and Bed / chair alarm activated    COMMUNICATION/EDUCATION:   The patients plan of care was discussed with: Occupational therapist and Registered nurse. Fall prevention education was provided and the patient/caregiver indicated understanding., Patient/family have participated as able in goal setting and plan of care. , and Patient/family agree to work toward stated goals and plan of care.     Thank you for this referral.  Major Martin, PT   Time Calculation: 40 mins

## 2021-07-03 NOTE — PROGRESS NOTES
Pod 1 hip fracture. A bit confused. Slow with pt  hgb 14.5    Patient Vitals for the past 24 hrs:   Temp Pulse Resp BP SpO2   07/03/21 1154 97.5 °F (36.4 °C) 60 18 (!) 117/48 98 %   07/03/21 1130  (!) 59  (!) 114/52    07/03/21 1118  64  137/69    07/03/21 1110  62  112/74    07/03/21 0714 97.7 °F (36.5 °C) 69 16 113/67 95 %   07/03/21 0320 97.9 °F (36.6 °C) 67 16 139/67 94 %   07/02/21 1950 97.7 °F (36.5 °C) 63 18 109/70 95 %   07/02/21 1853 98.2 °F (36.8 °C) (!) 59 18 (!) 109/53 94 %   07/02/21 1753 97.9 °F (36.6 °C) (!) 59 18 (!) 112/57 95 %   07/02/21 1653 97.3 °F (36.3 °C) 68 18 (!) 115/58 92 %   07/02/21 1553 97.3 °F (36.3 °C) 67 18 134/65 90 %   07/02/21 1530  64 15 (!) 109/45 94 %   07/02/21 1515  65 14 (!) 112/46 94 %   07/02/21 1502  67 15 (!) 113/46 93 %   07/02/21 1500  66 15 (!) 102/49 92 %   07/02/21 1445 97.4 °F (36.3 °C) 68 14 (!) 118/42 95 %   07/02/21 1430  70 15 (!) 118/47 97 %   07/02/21 1415  69 18 (!) 116/45 97 %   07/02/21 1400  70 18 (!) 119/46 96 %   07/02/21 1345  73 20 (!) 127/50 94 %   07/02/21 1340  72 18 (!) 129/53 95 %   07/02/21 1335  74 17 (!) 134/49 98 %   07/02/21 1330  74 18 (!) 132/50 99 %   07/02/21 1325  72 17 (!) 133/47 94 %   07/02/21 1322 98.8 °F (37.1 °C) 73 14 (!) 134/48 98 %     Dressing clean and dry  Musculoskeletal: Samira's sign negative in bilateral lower extremities. Calves soft, supple, non-tender upon palpation or with passive stretch.      oob with pt  wbat  Dc planning

## 2021-07-03 NOTE — PROGRESS NOTES
End of Shift Note    Bedside shift change report given to [facility] (oncoming nurse) by Delia Bello RN (offgoing nurse). Report included the following information SBAR, Kardex, Intake/Output, MAR, Recent Results and Cardiac Rhythm nsr    Shift worked:  day     Shift summary and any significant changes:     pt voided unmeasurable amounts post hopkins catheter removal. Bladder scan post void only showed 7 ml of retention. pt up to the chair for lunch. Vs stable     Concerns for physician to address:       Zone phone for oncoming shift:   [phone]       Activity:  Activity Level: Up with Assistance  Number times ambulated in hallways past shift: 0  Number of times OOB to chair past shift: 1    Cardiac:   Cardiac Monitoring: Yes      Cardiac Rhythm: Sinus Rhythm    Access:   Current line(s): PIV     Genitourinary:   Urinary status: voiding, incontinent and external catheter    Respiratory:   O2 Device: Nasal cannula  Chronic home O2 use?: NO  Incentive spirometer at bedside: YES     GI:  Last Bowel Movement Date: 07/01/21  Current diet:  ADULT DIET Regular  Passing flatus: YES  Tolerating current diet: YES       Pain Management:   Patient states pain is manageable on current regimen: YES    Skin:  Greg Score: 16  Interventions: float heels, increase time out of bed, foam dressing, PT/OT consult, limit briefs and internal/external urinary devices    Patient Safety:  Fall Score:  Total Score: 5  Interventions: bed/chair alarm, assistive device (walker, cane, etc), gripper socks, pt to call before getting OOB, stay with me (per policy) and gait belt  High Fall Risk: Yes    Length of Stay:  Expected LOS: - - -  Actual LOS: 2      Aretha Hollingsworth RN

## 2021-07-03 NOTE — PROGRESS NOTES
End of Shift Note    Bedside shift change report given to ALEKSANDAR Carias (oncoming nurse) by Allen Chandler RN (offgoing nurse). Report included the following information SBAR, Kardex, ED Summary, OR Summary, Intake/Output, MAR, Recent Results and Med Rec Status    Shift worked:  7p-7a     Shift summary and any significant changes:          Concerns for physician to address:       Zone phone for oncoming shift:   [phone]       Activity:  Activity Level: Up with Assistance  Number times ambulated in hallways past shift: 0  Number of times OOB to chair past shift: 0    Cardiac:   Cardiac Monitoring: Yes      Cardiac Rhythm: Sinus Rhythm    Access:   Current line(s): PIV     Genitourinary:   Urinary status: voiding    Respiratory:   O2 Device: Nasal cannula  Chronic home O2 use?: NO  Incentive spirometer at bedside: NO     GI:  Last Bowel Movement Date: 07/01/21  Current diet:  ADULT DIET Regular  Passing flatus: NO  Tolerating current diet: YES       Pain Management:   Patient states pain is manageable on current regimen: YES    Skin:  Greg Score: 16  Interventions: float heels, increase time out of bed and PT/OT consult    Patient Safety:  Fall Score:  Total Score: 5  Interventions: assistive device (walker, cane, etc), gripper socks, pt to call before getting OOB and stay with me (per policy)  High Fall Risk: Yes    Length of Stay:  Expected LOS: - - -  Actual LOS: 2      Hyun Brown RN

## 2021-07-04 LAB
HEMOCCULT STL QL: POSITIVE
HGB BLD-MCNC: 10.3 G/DL (ref 11.5–16)

## 2021-07-04 PROCEDURE — 74011250637 HC RX REV CODE- 250/637: Performed by: ORTHOPAEDIC SURGERY

## 2021-07-04 PROCEDURE — 77010033678 HC OXYGEN DAILY

## 2021-07-04 PROCEDURE — 94760 N-INVAS EAR/PLS OXIMETRY 1: CPT

## 2021-07-04 PROCEDURE — 65660000000 HC RM CCU STEPDOWN

## 2021-07-04 PROCEDURE — 36415 COLL VENOUS BLD VENIPUNCTURE: CPT

## 2021-07-04 PROCEDURE — 85018 HEMOGLOBIN: CPT

## 2021-07-04 PROCEDURE — 82272 OCCULT BLD FECES 1-3 TESTS: CPT

## 2021-07-04 RX ADMIN — ACETAMINOPHEN 650 MG: 325 TABLET ORAL at 20:58

## 2021-07-04 RX ADMIN — PHENYTOIN SODIUM 200 MG: 100 CAPSULE ORAL at 17:03

## 2021-07-04 RX ADMIN — Medication 1 TABLET: at 17:03

## 2021-07-04 RX ADMIN — FERROUS SULFATE TAB 325 MG (65 MG ELEMENTAL FE) 325 MG: 325 (65 FE) TAB at 08:36

## 2021-07-04 RX ADMIN — CITALOPRAM HYDROBROMIDE 40 MG: 20 TABLET ORAL at 08:36

## 2021-07-04 RX ADMIN — ACETAMINOPHEN 650 MG: 325 TABLET ORAL at 15:25

## 2021-07-04 RX ADMIN — ATORVASTATIN CALCIUM 20 MG: 20 TABLET, FILM COATED ORAL at 08:36

## 2021-07-04 RX ADMIN — FERROUS SULFATE TAB 325 MG (65 MG ELEMENTAL FE) 325 MG: 325 (65 FE) TAB at 17:03

## 2021-07-04 RX ADMIN — PHENYTOIN SODIUM 200 MG: 100 CAPSULE ORAL at 08:36

## 2021-07-04 RX ADMIN — Medication 10 ML: at 15:13

## 2021-07-04 RX ADMIN — Medication 1 AMPULE: at 08:40

## 2021-07-04 RX ADMIN — TRAMADOL HYDROCHLORIDE 50 MG: 50 TABLET, FILM COATED ORAL at 20:58

## 2021-07-04 RX ADMIN — ASPIRIN 81 MG: 81 TABLET, COATED ORAL at 17:03

## 2021-07-04 RX ADMIN — TRAMADOL HYDROCHLORIDE 50 MG: 50 TABLET, FILM COATED ORAL at 14:04

## 2021-07-04 RX ADMIN — Medication 10 ML: at 15:14

## 2021-07-04 RX ADMIN — ACETAMINOPHEN 650 MG: 325 TABLET ORAL at 08:35

## 2021-07-04 RX ADMIN — Medication 1 TABLET: at 08:35

## 2021-07-04 RX ADMIN — POLYETHYLENE GLYCOL 3350 17 G: 17 POWDER, FOR SOLUTION ORAL at 08:35

## 2021-07-04 RX ADMIN — Medication 1 TABLET: at 11:14

## 2021-07-04 RX ADMIN — TRAMADOL HYDROCHLORIDE 50 MG: 50 TABLET, FILM COATED ORAL at 08:35

## 2021-07-04 RX ADMIN — Medication 1 AMPULE: at 21:01

## 2021-07-04 RX ADMIN — ACETAMINOPHEN 650 MG: 325 TABLET ORAL at 05:48

## 2021-07-04 RX ADMIN — ACETAMINOPHEN 650 MG: 325 TABLET ORAL at 11:43

## 2021-07-04 RX ADMIN — METOPROLOL SUCCINATE 50 MG: 50 TABLET, EXTENDED RELEASE ORAL at 08:35

## 2021-07-04 RX ADMIN — ASPIRIN 81 MG: 81 TABLET, COATED ORAL at 08:36

## 2021-07-04 RX ADMIN — DILTIAZEM HYDROCHLORIDE 240 MG: 240 CAPSULE, COATED, EXTENDED RELEASE ORAL at 08:36

## 2021-07-04 NOTE — PROGRESS NOTES
Po hip fracture. Pain well managed. A bit more clear today  hgb 11.5    Patient Vitals for the past 24 hrs:   Temp Pulse Resp BP SpO2   07/04/21 1122 98.2 °F (36.8 °C) 62 18 133/80 96 %   07/04/21 0709 98.2 °F (36.8 °C) 72 18 128/84 93 %   07/04/21 0310 99.2 °F (37.3 °C) 61 18 (!) 110/49 92 %   07/03/21 2330 98.9 °F (37.2 °C) 64 16 (!) 108/53 92 %   07/03/21 2004 98.1 °F (36.7 °C) 62 18 (!) 134/56 96 %   07/03/21 1511 97.9 °F (36.6 °C) 63 18 (!) 109/45 94 %     Dressing with some dry blood  Some swelling operative site  Musculoskeletal: Samira's sign negative in bilateral lower extremities. Calves soft, supple, non-tender upon palpation or with passive stretch.      Dc planning  wbat

## 2021-07-04 NOTE — PROGRESS NOTES
Hospitalist Progress Note    NAME: Lory Cid   :  1940   MRN:  [id number]       Assessment / Plan:  Ground-level fall andAcute left femoral neck fracture POA  Ortho consultation  s/p left hip hemiarthroplasty     Patient has diarrhea over the last few days  Leukocytosis  Follow-up stool study        Stool softer that she was on held   Hypertension continue home medications  Chronic atrial fibrillation not on anticoagulation seen in chart. Continue  rate control medications  History of seizure continue with seizure medication        30.0 - 39.9 Obese / Body mass index is 35.43 kg/m².     Estimated discharge date:   Barriers:     Code status: Full  Prophylaxis: Lovenox and Per Ortho  Recommended Disposition: SNF/LTC              Subjective:     Chief Complaint / Reason for Physician Visit    Discussed with RN events overnight. Complaining from pain     Review of Systems:  Symptom Y/N Comments  Symptom Y/N Comments   Fever/Chills n   Chest Pain n    Poor Appetite    Edema     Cough    Abdominal Pain     Sputum    Joint Pain y    SOB/STEIN    Pruritis/Rash     Nausea/vomit n   Tolerating PT/OT     Diarrhea    Tolerating Diet y    Constipation    Other       Could NOT obtain due to:      Objective:     VITALS:   Last 24hrs VS reviewed since prior progress note.  Most recent are:  Patient Vitals for the past 24 hrs:   Temp Pulse Resp BP SpO2   21 1122 98.2 °F (36.8 °C) 62 18 133/80 96 %   21 0709 98.2 °F (36.8 °C) 72 18 128/84 93 %   21 0310 99.2 °F (37.3 °C) 61 18 (!) 110/49 92 %   21 2330 98.9 °F (37.2 °C) 64 16 (!) 108/53 92 %   21 2004 98.1 °F (36.7 °C) 62 18 (!) 134/56 96 %   21 1511 97.9 °F (36.6 °C) 63 18 (!) 109/45 94 %   21 1154 97.5 °F (36.4 °C) 60 18 (!) 117/48 98 %     No intake or output data in the 24 hours ending 21 1145     I had a face to face encounter and independently examined this patient on 2021, as outlined below:  PHYSICAL EXAM:  General: WD, WN. Alert, cooperative, no acute distress    EENT:  EOMI. Anicteric sclerae. MMM  Resp:  CTA bilaterally, no wheezing or rales. No accessory muscle use  CV:  Regular  rhythm,  No edema  GI:  Soft, Non distended, Non tender. +Bowel sounds  Neurologic:  Alert and oriented X 2, normal speech,   Psych:   Good insight. Not anxious nor agitated  Skin:  No rashes. No jaundice    Reviewed most current lab test results and cultures  YES  Reviewed most current radiology test results   YES  Review and summation of old records today    NO  Reviewed patient's current orders and MAR    YES  PMH/SH reviewed - no change compared to H&P  ________________________________________________________________________  Care Plan discussed with:    Comments   Patient y    Family      RN y    Care Manager     Consultant                        Multidiciplinary team rounds were held today with , nursing, pharmacist and clinical coordinator. Patient's plan of care was discussed; medications were reviewed and discharge planning was addressed. ________________________________________________________________________  Total NON critical care TIME: 35   Minutes    Total CRITICAL CARE TIME Spent:   Minutes non procedure based      Comments   >50% of visit spent in counseling and coordination of care y    ________________________________________________________________________  Severa Pare, MD     Procedures: see electronic medical records for all procedures/Xrays and details which were not copied into this note but were reviewed prior to creation of Plan. LABS:  I reviewed today's most current labs and imaging studies. Pertinent labs include:  Recent Labs     07/04/21  0307 07/03/21  0335 07/02/21  0522 07/01/21  1449 07/01/21  1449   WBC  --  14.1* 16.1*  --  18.7*   HGB 10.3* 11.5 14.5   < > 13.5   HCT  --  36.6 43.7  --  41.6   PLT  --  264 268  --  301    < > = values in this interval not displayed. Recent Labs     07/03/21  0335 07/02/21  0522 07/01/21  1449   * 131* 135*   K 4.3 3.6 3.6   CL 98 96* 97   CO2 31 30 33*   GLU 99 97 86   BUN 19 13 14   CREA 1.12* 0.61 0.68   CA 8.9 9.6 8.5   MG  --   --  1.8   ALB  --  3.3* 3.0*   TBILI  --  0.6 0.6   ALT  --  22 23   INR  --   --  1.1       Signed:  Erin Chinchilla MD

## 2021-07-04 NOTE — PROGRESS NOTES
End of Shift Note    Bedside shift change report given to [facility] (oncoming nurse) by Hillary Zavala RN (offgoing nurse). Report included the following information SBAR, Kardex, Intake/Output, MAR, Recent Results and Cardiac Rhythm nsr    Shift worked:  day     Shift summary and any significant changes:    Vs stable. Pt voided 500 cc of nola urine. Small smear greenish black bm. Occult stool sample sent to lab from smear. Concerns for physician to address:       Zone phone for oncoming shift:   [phone]       Activity:  Activity Level: Up with Assistance  Number times ambulated in hallways past shift: 0  Number of times OOB to chair past shift:     Cardiac:   Cardiac Monitoring: Yes      Cardiac Rhythm: Sinus Rhythm    Access:   Current line(s): PIV     Genitourinary:   Urinary status: voiding, incontinent and external catheter    Respiratory:   O2 Device: None (Room air)  Chronic home O2 use?: NO  Incentive spirometer at bedside: YES     GI:  Last Bowel Movement Date: 07/04/21  Current diet:  ADULT DIET Regular  Passing flatus: YES  Tolerating current diet: YES       Pain Management:   Patient states pain is manageable on current regimen: YES    Skin:  Greg Score: 15  Interventions: float heels, increase time out of bed, foam dressing, PT/OT consult, limit briefs and internal/external urinary devices    Patient Safety:  Fall Score:  Total Score: 5  Interventions: bed/chair alarm, assistive device (walker, cane, etc), gripper socks, pt to call before getting OOB, stay with me (per policy) and gait belt  High Fall Risk: Yes    Length of Stay:  Expected LOS: - - -  Actual LOS: 3      Aretha Jaimes RN

## 2021-07-04 NOTE — PROGRESS NOTES
End of Shift Note    Bedside shift change report given to Aretha HUERTAS (oncoming nurse) by Jeffery Alvares LPN (offgoing nurse). Report included the following information SBAR, Kardex, Intake/Output, MAR and Recent Results    Shift worked:  night     Shift summary and any significant changes:     pt had period of confusion this evening; A/Ox3 but disoriented to time     Concerns for physician to address:       Zone phone for oncoming shift:   [phone]       Activity:  Activity Level: Up with Assistance  Number times ambulated in hallways past shift: 0  Number of times OOB to chair past shift: 0    Cardiac:   Cardiac Monitoring: Yes      Cardiac Rhythm: Sinus Rhythm    Access:   Current line(s): PIV     Genitourinary:   Urinary status: voiding and external catheter    Respiratory:   O2 Device: Nasal cannula  Chronic home O2 use?: NO  Incentive spirometer at bedside: YES     GI:  Last Bowel Movement Date: 07/01/21  Current diet:  ADULT DIET Regular  Passing flatus: YES  Tolerating current diet: YES       Pain Management:   Patient states pain is manageable on current regimen: YES    Skin:  Greg Score: 15  Interventions: float heels, increase time out of bed, PT/OT consult and internal/external urinary devices    Patient Safety:  Fall Score:  Total Score: 5  Interventions: bed/chair alarm, assistive device (walker, cane, etc), gripper socks, pt to call before getting OOB, stay with me (per policy) and gait belt  High Fall Risk: Yes    Length of Stay:  Expected LOS: - - -  Actual LOS: 3      Jeffery Alvares LPN

## 2021-07-04 NOTE — PROGRESS NOTES
Chart reviewed for PT treatment, and patient cleared by nursing to participate with caveat \"I don't know how much she's going to do for you, she's in a lot of pain\". Patient moaning in bed with eyes closed at time of speaking with nursing. Nursing states that patient's pain is difficult to manage because she's sensitive to narcotics and is easily over-medicated. Patient is able to open her eyes with repeated encouragement/verbal cues, she says \"no\" to all offers of therapeutic intervention/assist. Will defer for now and try back later today, if time, or resume daily PT treatments as per plan of care tomorrow.

## 2021-07-04 NOTE — PROGRESS NOTES
Problem: Falls - Risk of  Goal: *Absence of Falls  Description: Document Ramya Lozoya Fall Risk and appropriate interventions in the flowsheet.   Outcome: Progressing Towards Goal  Note: Fall Risk Interventions:  Mobility Interventions: Assess mobility with egress test, Communicate number of staff needed for ambulation/transfer, OT consult for ADLs, PT Consult for assist device competence, Patient to call before getting OOB, PT Consult for mobility concerns, Strengthening exercises (ROM-active/passive), Utilize walker, cane, or other assistive device, Utilize gait belt for transfers/ambulation, Bed/chair exit alarm    Mentation Interventions: Adequate sleep, hydration, pain control, Bed/chair exit alarm, Door open when patient unattended, Evaluate medications/consider consulting pharmacy, Eyeglasses and hearing aids, Familiar objects from home, Gait belt with transfers/ambulation, Update white board, Toileting rounds, Room close to nurse's station, Reorient patient, More frequent rounding, Increase mobility    Medication Interventions: Assess postural VS orthostatic hypotension, Bed/chair exit alarm, Evaluate medications/consider consulting pharmacy, Patient to call before getting OOB, Teach patient to arise slowly, Utilize gait belt for transfers/ambulation    Elimination Interventions: Bed/chair exit alarm, Call light in reach, Elevated toilet seat, Patient to call for help with toileting needs, Stay With Me (per policy), Toilet paper/wipes in reach, Toileting schedule/hourly rounds    History of Falls Interventions: Bed/chair exit alarm, Consult care management for discharge planning, Door open when patient unattended, Evaluate medications/consider consulting pharmacy, Room close to nurse's station, Vital signs minimum Q4HRs X 24 hrs (comment for end date), Utilize gait belt for transfer/ambulation, Assess for delayed presentation/identification of injury for 48 hrs (comment for end date)         Problem: Patient Education: Go to Patient Education Activity  Goal: Patient/Family Education  Outcome: Progressing Towards Goal     Problem: Pressure Injury - Risk of  Goal: *Prevention of pressure injury  Description: Document Greg Scale and appropriate interventions in the flowsheet. Outcome: Progressing Towards Goal  Note: Pressure Injury Interventions:  Sensory Interventions: Assess changes in LOC, Assess need for specialty bed, Avoid rigorous massage over bony prominences, Chair cushion, Check visual cues for pain, Float heels, Discuss PT/OT consult with provider, Maintain/enhance activity level, Keep linens dry and wrinkle-free, Minimize linen layers, Monitor skin under medical devices, Pad between skin to skin, Pressure redistribution bed/mattress (bed type)    Moisture Interventions: Absorbent underpads, Apply protective barrier, creams and emollients, Assess need for specialty bed, Check for incontinence Q2 hours and as needed, Contain wound drainage, Limit adult briefs, Minimize layers, Maintain skin hydration (lotion/cream), Moisture barrier    Activity Interventions: Assess need for specialty bed, Chair cushion, Increase time out of bed, Pressure redistribution bed/mattress(bed type), PT/OT evaluation    Mobility Interventions: Assess need for specialty bed, HOB 30 degrees or less, Float heels, Chair cushion, Pressure redistribution bed/mattress (bed type), PT/OT evaluation, Turn and reposition approx.  every two hours(pillow and wedges)    Nutrition Interventions: Document food/fluid/supplement intake, Discuss nutritional consult with provider, Offer support with meals,snacks and hydration    Friction and Shear Interventions: Apply protective barrier, creams and emollients, Feet elevated on foot rest, Foam dressings/transparent film/skin sealants, HOB 30 degrees or less, Lift sheet, Lift team/patient mobility team, Minimize layers                Problem: Patient Education: Go to Patient Education Activity  Goal: Patient/Family Education  Outcome: Progressing Towards Goal     Problem: Patient Education: Go to Patient Education Activity  Goal: Patient/Family Education  Outcome: Progressing Towards Goal     Problem: Patient Education: Go to Patient Education Activity  Goal: Patient/Family Education  Outcome: Progressing Towards Goal

## 2021-07-05 ENCOUNTER — APPOINTMENT (OUTPATIENT)
Dept: CT IMAGING | Age: 81
DRG: 522 | End: 2021-07-05
Attending: INTERNAL MEDICINE
Payer: MEDICARE

## 2021-07-05 LAB
ANION GAP SERPL CALC-SCNC: 6 MMOL/L (ref 5–15)
BASOPHILS # BLD: 0 K/UL (ref 0–0.1)
BASOPHILS NFR BLD: 0 % (ref 0–1)
BUN SERPL-MCNC: 22 MG/DL (ref 6–20)
BUN/CREAT SERPL: 42 (ref 12–20)
CALCIUM SERPL-MCNC: 8.9 MG/DL (ref 8.5–10.1)
CHLORIDE SERPL-SCNC: 99 MMOL/L (ref 97–108)
CO2 SERPL-SCNC: 29 MMOL/L (ref 21–32)
CREAT SERPL-MCNC: 0.53 MG/DL (ref 0.55–1.02)
DIFFERENTIAL METHOD BLD: ABNORMAL
EOSINOPHIL # BLD: 0.5 K/UL (ref 0–0.4)
EOSINOPHIL NFR BLD: 4 % (ref 0–7)
ERYTHROCYTE [DISTWIDTH] IN BLOOD BY AUTOMATED COUNT: 13.9 % (ref 11.5–14.5)
GLUCOSE SERPL-MCNC: 87 MG/DL (ref 65–100)
HCT VFR BLD AUTO: 34.4 % (ref 35–47)
HGB BLD-MCNC: 11 G/DL (ref 11.5–16)
IMM GRANULOCYTES # BLD AUTO: 0.1 K/UL (ref 0–0.04)
IMM GRANULOCYTES NFR BLD AUTO: 0 % (ref 0–0.5)
LYMPHOCYTES # BLD: 0.8 K/UL (ref 0.8–3.5)
LYMPHOCYTES NFR BLD: 7 % (ref 12–49)
MCH RBC QN AUTO: 31.3 PG (ref 26–34)
MCHC RBC AUTO-ENTMCNC: 32 G/DL (ref 30–36.5)
MCV RBC AUTO: 98 FL (ref 80–99)
MONOCYTES # BLD: 1.2 K/UL (ref 0–1)
MONOCYTES NFR BLD: 10 % (ref 5–13)
NEUTS SEG # BLD: 9.7 K/UL (ref 1.8–8)
NEUTS SEG NFR BLD: 79 % (ref 32–75)
NRBC # BLD: 0 K/UL (ref 0–0.01)
NRBC BLD-RTO: 0 PER 100 WBC
PLATELET # BLD AUTO: 250 K/UL (ref 150–400)
PMV BLD AUTO: 9.4 FL (ref 8.9–12.9)
POTASSIUM SERPL-SCNC: 4 MMOL/L (ref 3.5–5.1)
RBC # BLD AUTO: 3.51 M/UL (ref 3.8–5.2)
SODIUM SERPL-SCNC: 134 MMOL/L (ref 136–145)
WBC # BLD AUTO: 12.3 K/UL (ref 3.6–11)

## 2021-07-05 PROCEDURE — 77030038269 HC DRN EXT URIN PURWCK BARD -A

## 2021-07-05 PROCEDURE — 74011000250 HC RX REV CODE- 250: Performed by: INTERNAL MEDICINE

## 2021-07-05 PROCEDURE — 85025 COMPLETE CBC W/AUTO DIFF WBC: CPT

## 2021-07-05 PROCEDURE — 74011250637 HC RX REV CODE- 250/637: Performed by: ORTHOPAEDIC SURGERY

## 2021-07-05 PROCEDURE — 65660000000 HC RM CCU STEPDOWN

## 2021-07-05 PROCEDURE — 97530 THERAPEUTIC ACTIVITIES: CPT

## 2021-07-05 PROCEDURE — 94760 N-INVAS EAR/PLS OXIMETRY 1: CPT

## 2021-07-05 PROCEDURE — 94640 AIRWAY INHALATION TREATMENT: CPT

## 2021-07-05 PROCEDURE — 80048 BASIC METABOLIC PNL TOTAL CA: CPT

## 2021-07-05 PROCEDURE — 74011250636 HC RX REV CODE- 250/636: Performed by: ORTHOPAEDIC SURGERY

## 2021-07-05 PROCEDURE — 2709999900 HC NON-CHARGEABLE SUPPLY

## 2021-07-05 PROCEDURE — 36415 COLL VENOUS BLD VENIPUNCTURE: CPT

## 2021-07-05 PROCEDURE — 72131 CT LUMBAR SPINE W/O DYE: CPT

## 2021-07-05 PROCEDURE — 87506 IADNA-DNA/RNA PROBE TQ 6-11: CPT

## 2021-07-05 RX ORDER — IPRATROPIUM BROMIDE AND ALBUTEROL SULFATE 2.5; .5 MG/3ML; MG/3ML
3 SOLUTION RESPIRATORY (INHALATION)
Status: DISCONTINUED | OUTPATIENT
Start: 2021-07-05 | End: 2021-07-07 | Stop reason: HOSPADM

## 2021-07-05 RX ORDER — LIDOCAINE 4 G/100G
1 PATCH TOPICAL EVERY 24 HOURS
Status: DISCONTINUED | OUTPATIENT
Start: 2021-07-05 | End: 2021-07-07 | Stop reason: HOSPADM

## 2021-07-05 RX ADMIN — PHENYTOIN SODIUM 200 MG: 100 CAPSULE ORAL at 08:29

## 2021-07-05 RX ADMIN — POLYETHYLENE GLYCOL 3350 17 G: 17 POWDER, FOR SOLUTION ORAL at 08:27

## 2021-07-05 RX ADMIN — ACETAMINOPHEN 650 MG: 325 TABLET ORAL at 03:59

## 2021-07-05 RX ADMIN — Medication 1 TABLET: at 17:11

## 2021-07-05 RX ADMIN — Medication 1 AMPULE: at 21:47

## 2021-07-05 RX ADMIN — Medication 1 TABLET: at 11:57

## 2021-07-05 RX ADMIN — PHENYTOIN SODIUM 200 MG: 100 CAPSULE ORAL at 17:11

## 2021-07-05 RX ADMIN — ASPIRIN 81 MG: 81 TABLET, COATED ORAL at 17:10

## 2021-07-05 RX ADMIN — DILTIAZEM HYDROCHLORIDE 240 MG: 240 CAPSULE, COATED, EXTENDED RELEASE ORAL at 08:30

## 2021-07-05 RX ADMIN — CITALOPRAM HYDROBROMIDE 40 MG: 20 TABLET ORAL at 08:27

## 2021-07-05 RX ADMIN — ACETAMINOPHEN 650 MG: 325 TABLET ORAL at 17:08

## 2021-07-05 RX ADMIN — ONDANSETRON 4 MG: 2 INJECTION INTRAMUSCULAR; INTRAVENOUS at 14:25

## 2021-07-05 RX ADMIN — HYDRALAZINE HYDROCHLORIDE 10 MG: 20 INJECTION INTRAMUSCULAR; INTRAVENOUS at 14:12

## 2021-07-05 RX ADMIN — Medication 1 AMPULE: at 08:27

## 2021-07-05 RX ADMIN — Medication 10 ML: at 21:44

## 2021-07-05 RX ADMIN — ATORVASTATIN CALCIUM 20 MG: 20 TABLET, FILM COATED ORAL at 08:30

## 2021-07-05 RX ADMIN — ACETAMINOPHEN 650 MG: 325 TABLET ORAL at 08:28

## 2021-07-05 RX ADMIN — Medication 1 TABLET: at 08:30

## 2021-07-05 RX ADMIN — Medication 5 ML: at 06:39

## 2021-07-05 RX ADMIN — ASPIRIN 81 MG: 81 TABLET, COATED ORAL at 08:27

## 2021-07-05 RX ADMIN — Medication 5 ML: at 02:35

## 2021-07-05 RX ADMIN — TRAMADOL HYDROCHLORIDE 50 MG: 50 TABLET, FILM COATED ORAL at 03:59

## 2021-07-05 RX ADMIN — FERROUS SULFATE TAB 325 MG (65 MG ELEMENTAL FE) 325 MG: 325 (65 FE) TAB at 08:29

## 2021-07-05 RX ADMIN — METOPROLOL SUCCINATE 50 MG: 50 TABLET, EXTENDED RELEASE ORAL at 08:29

## 2021-07-05 RX ADMIN — IPRATROPIUM BROMIDE AND ALBUTEROL SULFATE 3 ML: .5; 3 SOLUTION RESPIRATORY (INHALATION) at 15:27

## 2021-07-05 RX ADMIN — FERROUS SULFATE TAB 325 MG (65 MG ELEMENTAL FE) 325 MG: 325 (65 FE) TAB at 17:12

## 2021-07-05 NOTE — PROGRESS NOTES
End of Shift Note    Bedside shift change report given to Evelia Mar RN (oncoming nurse) by Brooke Hanson LPN (offgoing nurse). Report included the following information SBAR, Kardex, Intake/Output, MAR and Recent Results    Shift worked:  night     Shift summary and any significant changes:     more confusion throughout the night     Concerns for physician to address:       Zone phone for oncoming shift:   [phone]       Activity:  Activity Level: Up with Assistance  Number times ambulated in hallways past shift: 0  Number of times OOB to chair past shift: 0    Cardiac:   Cardiac Monitoring: Yes      Cardiac Rhythm: Sinus Rhythm    Access:   Current line(s): PIV     Genitourinary:   Urinary status: voiding, incontinent and external catheter    Respiratory:   O2 Device: None (Room air)  Chronic home O2 use?: NO  Incentive spirometer at bedside: YES     GI:  Last Bowel Movement Date: 07/04/21  Current diet:  ADULT DIET Regular  Passing flatus: YES  Tolerating current diet: YES       Pain Management:   Patient states pain is manageable on current regimen: YES    Skin:  Greg Score: 15  Interventions: float heels, increase time out of bed, PT/OT consult and internal/external urinary devices    Patient Safety:  Fall Score:  Total Score: 5  Interventions: bed/chair alarm, assistive device (walker, cane, etc), gripper socks, pt to call before getting OOB and stay with me (per policy)  High Fall Risk: Yes    Length of Stay:  Expected LOS: - - -  Actual LOS: 4      Brooke Hanson LPN

## 2021-07-05 NOTE — PROGRESS NOTES
Bedside shift change report given to *** (oncoming nurse) by Annalisa Fernandes LPN (offgoing nurse). Report included the following information SBAR, Kardex, Intake/Output, MAR, Recent Results and Cardiac Rhythm NSR.

## 2021-07-05 NOTE — PROGRESS NOTES
Bedside and Verbal shift change report given to [place] (oncoming nurse) by True Chambers (offgoing nurse). Report included the following information SBAR, Kardex, Intake/Output and MAR.

## 2021-07-05 NOTE — PROGRESS NOTES
Hospitalist Progress Note    NAME: Joel Hidalgo   :  1940   MRN:  [id number]       Assessment / Plan:  Ground-level fall andAcute left femoral neck fracture POA  Ortho consultation  s/p left hip hemiarthroplasty     Leukocytosis improving     Back pian   -follow up CT lumbar     Hypertension continue home medications  Chronic atrial fibrillation not on anticoagulation seen in chart. Continue  rate control medications  History of seizure continue with seizure medication        30.0 - 39.9 Obese / Body mass index is 35.43 kg/m².     Estimated discharge date:   Barriers:     Code status: Full  Prophylaxis: Lovenox and Per Ortho  Recommended Disposition: SNF/LTC           Subjective:     Chief Complaint / Reason for Physician Visit  . Discussed with RN events overnight. Ready to be d/c waiting for placement     Review of Systems:  Symptom Y/N Comments  Symptom Y/N Comments   Fever/Chills n   Chest Pain     Poor Appetite    Edema     Cough    Abdominal Pain     Sputum    Joint Pain y    SOB/STEIN n   Pruritis/Rash     Nausea/vomit    Tolerating PT/OT     Diarrhea    Tolerating Diet y    Constipation n   Other       Could NOT obtain due to:      Objective:     VITALS:   Last 24hrs VS reviewed since prior progress note.  Most recent are:  Patient Vitals for the past 24 hrs:   Temp Pulse Resp BP SpO2   21 1907 97.4 °F (36.3 °C) 66 20 124/66 90 %   21 1531     93 %   21 1509 98.5 °F (36.9 °C) 69 20 (!) 140/80 93 %   21 1404 97.9 °F (36.6 °C) 60 19 (!) 161/63 95 %   21 0736 97.9 °F (36.6 °C) 66 26 (!) 166/71 94 %   21 0350 98.6 °F (37 °C) 64 20 (!) 154/66 92 %   21 2322 97.9 °F (36.6 °C) 62 16 (!) 140/72 94 %   21 1941 °F (36.8 °C) 62 24 (!) 150/68 93 %       Intake/Output Summary (Last 24 hours) at 2021 1918  Last data filed at 2021 0350  Gross per 24 hour   Intake    Output 750 ml   Net -750 ml        I had a face to face encounter and independently examined this patient on 7/5/2021, as outlined below:  PHYSICAL EXAM:  General: WD, WN. Alert, cooperative, no acute distress    EENT:  EOMI. Anicteric sclerae. MMM  Resp:  CTA bilaterally, no wheezing or rales. No accessory muscle use  CV:  Regular  rhythm,  No edema  GI:  Soft, Non distended, Non tender. +Bowel sounds  Neurologic:  Alert and oriented X2,   Psych:   Good insight. Not anxious nor agitated  Skin:  No rashes. No jaundice    Reviewed most current lab test results and cultures  YES  Reviewed most current radiology test results   YES  Review and summation of old records today    NO  Reviewed patient's current orders and MAR    YES  PMH/SH reviewed - no change compared to H&P  ________________________________________________________________________  Care Plan discussed with:    Comments   Patient y    Family      RN y    Care Manager     Consultant                        Multidiciplinary team rounds were held today with , nursing, pharmacist and clinical coordinator. Patient's plan of care was discussed; medications were reviewed and discharge planning was addressed. ________________________________________________________________________  Total NON critical care TIME:  35   Minutes    Total CRITICAL CARE TIME Spent:   Minutes non procedure based      Comments   >50% of visit spent in counseling and coordination of care     ________________________________________________________________________  Ignacio Lora MD     Procedures: see electronic medical records for all procedures/Xrays and details which were not copied into this note but were reviewed prior to creation of Plan. LABS:  I reviewed today's most current labs and imaging studies.   Pertinent labs include:  Recent Labs     07/05/21 0246 07/04/21  0307 07/03/21  0335   WBC 12.3*  --  14.1*   HGB 11.0* 10.3* 11.5   HCT 34.4*  --  36.6     --  264     Recent Labs     07/05/21 0246 07/03/21  0335   NA 134* 134*   K 4.0 4.3   CL 99 98   CO2 29 31   GLU 87 99   BUN 22* 19   CREA 0.53* 1.12*   CA 8.9 8.9       Signed:  Bonita Ganser, MD

## 2021-07-06 ENCOUNTER — APPOINTMENT (OUTPATIENT)
Dept: MRI IMAGING | Age: 81
DRG: 522 | End: 2021-07-06
Attending: INTERNAL MEDICINE
Payer: MEDICARE

## 2021-07-06 LAB
BACTERIA SPEC CULT: NORMAL
CAMPYLOBACTER SPECIES, DNA: NEGATIVE
ENTEROTOXIGEN E COLI, DNA: NEGATIVE
P SHIGELLOIDES DNA STL QL NAA+PROBE: NEGATIVE
PROCALCITONIN SERPL-MCNC: 0.16 NG/ML
SALMONELLA SPECIES, DNA: NEGATIVE
SERVICE CMNT-IMP: NORMAL
SHIGA TOXIN PRODUCING, DNA: NEGATIVE
SHIGELLA SP+EIEC IPAH STL QL NAA+PROBE: NEGATIVE
VIBRIO SPECIES, DNA: NEGATIVE
Y. ENTEROCOLITICA, DNA: NEGATIVE

## 2021-07-06 PROCEDURE — 65660000000 HC RM CCU STEPDOWN

## 2021-07-06 PROCEDURE — 36415 COLL VENOUS BLD VENIPUNCTURE: CPT

## 2021-07-06 PROCEDURE — 74011250637 HC RX REV CODE- 250/637: Performed by: ORTHOPAEDIC SURGERY

## 2021-07-06 PROCEDURE — 74011000250 HC RX REV CODE- 250: Performed by: INTERNAL MEDICINE

## 2021-07-06 PROCEDURE — 72148 MRI LUMBAR SPINE W/O DYE: CPT

## 2021-07-06 PROCEDURE — 84145 PROCALCITONIN (PCT): CPT

## 2021-07-06 PROCEDURE — 77010033678 HC OXYGEN DAILY

## 2021-07-06 PROCEDURE — 94640 AIRWAY INHALATION TREATMENT: CPT

## 2021-07-06 PROCEDURE — 94760 N-INVAS EAR/PLS OXIMETRY 1: CPT

## 2021-07-06 PROCEDURE — 74011250637 HC RX REV CODE- 250/637: Performed by: INTERNAL MEDICINE

## 2021-07-06 PROCEDURE — 77030038269 HC DRN EXT URIN PURWCK BARD -A

## 2021-07-06 PROCEDURE — 87086 URINE CULTURE/COLONY COUNT: CPT

## 2021-07-06 PROCEDURE — 97530 THERAPEUTIC ACTIVITIES: CPT | Performed by: OCCUPATIONAL THERAPIST

## 2021-07-06 RX ORDER — NITROFURANTOIN 25; 75 MG/1; MG/1
100 CAPSULE ORAL 2 TIMES DAILY
Status: DISCONTINUED | OUTPATIENT
Start: 2021-07-06 | End: 2021-07-07 | Stop reason: HOSPADM

## 2021-07-06 RX ORDER — FAMOTIDINE 20 MG/1
20 TABLET, FILM COATED ORAL DAILY
Status: DISCONTINUED | OUTPATIENT
Start: 2021-07-07 | End: 2021-07-07 | Stop reason: HOSPADM

## 2021-07-06 RX ADMIN — FERROUS SULFATE TAB 325 MG (65 MG ELEMENTAL FE) 325 MG: 325 (65 FE) TAB at 08:31

## 2021-07-06 RX ADMIN — METOPROLOL SUCCINATE 50 MG: 50 TABLET, EXTENDED RELEASE ORAL at 08:30

## 2021-07-06 RX ADMIN — TRAMADOL HYDROCHLORIDE 50 MG: 50 TABLET, FILM COATED ORAL at 14:12

## 2021-07-06 RX ADMIN — IPRATROPIUM BROMIDE AND ALBUTEROL SULFATE 3 ML: .5; 3 SOLUTION RESPIRATORY (INHALATION) at 21:17

## 2021-07-06 RX ADMIN — ACETAMINOPHEN 650 MG: 325 TABLET ORAL at 21:02

## 2021-07-06 RX ADMIN — ASPIRIN 81 MG: 81 TABLET, COATED ORAL at 17:03

## 2021-07-06 RX ADMIN — ACETAMINOPHEN 650 MG: 325 TABLET ORAL at 08:31

## 2021-07-06 RX ADMIN — Medication 1 AMPULE: at 21:04

## 2021-07-06 RX ADMIN — Medication 1 TABLET: at 16:55

## 2021-07-06 RX ADMIN — Medication 10 ML: at 13:15

## 2021-07-06 RX ADMIN — TRAMADOL HYDROCHLORIDE 50 MG: 50 TABLET, FILM COATED ORAL at 08:31

## 2021-07-06 RX ADMIN — FERROUS SULFATE TAB 325 MG (65 MG ELEMENTAL FE) 325 MG: 325 (65 FE) TAB at 17:03

## 2021-07-06 RX ADMIN — CITALOPRAM HYDROBROMIDE 40 MG: 20 TABLET ORAL at 08:30

## 2021-07-06 RX ADMIN — Medication 1 TABLET: at 08:30

## 2021-07-06 RX ADMIN — Medication 10 ML: at 06:21

## 2021-07-06 RX ADMIN — PHENYTOIN SODIUM 200 MG: 100 CAPSULE ORAL at 08:31

## 2021-07-06 RX ADMIN — NITROFURANTOIN (MONOHYDRATE/MACROCRYSTALS) 100 MG: 75; 25 CAPSULE ORAL at 17:03

## 2021-07-06 RX ADMIN — ASPIRIN 81 MG: 81 TABLET, COATED ORAL at 08:31

## 2021-07-06 RX ADMIN — Medication 1 TABLET: at 13:15

## 2021-07-06 RX ADMIN — PHENYTOIN SODIUM 200 MG: 100 CAPSULE ORAL at 17:03

## 2021-07-06 RX ADMIN — ACETAMINOPHEN 650 MG: 325 TABLET ORAL at 14:12

## 2021-07-06 RX ADMIN — ACETAMINOPHEN 650 MG: 325 TABLET ORAL at 04:40

## 2021-07-06 RX ADMIN — ATORVASTATIN CALCIUM 20 MG: 20 TABLET, FILM COATED ORAL at 08:31

## 2021-07-06 RX ADMIN — Medication 10 ML: at 21:05

## 2021-07-06 RX ADMIN — Medication 10 ML: at 14:12

## 2021-07-06 RX ADMIN — DILTIAZEM HYDROCHLORIDE 240 MG: 240 CAPSULE, COATED, EXTENDED RELEASE ORAL at 08:31

## 2021-07-06 RX ADMIN — Medication 1 AMPULE: at 08:33

## 2021-07-06 NOTE — PROGRESS NOTES
Transition of Care Plan:  RUR:15%   Disposition:SNF-[facility] (insurance auth obtained 7/6)  Follow up appointments:PCP and specialist  DME needed:No DME needed- pt going to SNF  Transportation at Discharge: [facility]  Keys or means to access home: n/a pt going to snf      Medicare letter: to be provided prior to d/c  Caregiver Contact:RejiRosa Elena dtr [phone]  Discharge Caregiver contacted prior to discharge? to be contacted prior to d/c    3:55pm  Insurance Nika Herr has been obtained. Auth # [id number] and reference # [id number]. MRI pending for new L3 and L4 fractures. 1:55pm  [facility]. Per [facility] referral is still under review. 8:15am  [facility] has accepted patient for SNF. CM acknowledged updated therapy notes in chart. CM has sent information to [facility] to start insurance authorization.      Boo Leroy, [facility]

## 2021-07-06 NOTE — PROGRESS NOTES
MRI Pending:    Need completed online [facility] screening form. Sign and fax to [phone]. Call [phone] once faxed.

## 2021-07-06 NOTE — PROGRESS NOTES
Problem: Falls - Risk of  Goal: *Absence of Falls  Description: Document Venkat Hubbard Fall Risk and appropriate interventions in the flowsheet.   Outcome: Progressing Towards Goal  Note: Fall Risk Interventions:  Mobility Interventions: Assess mobility with egress test, Communicate number of staff needed for ambulation/transfer, OT consult for ADLs, Patient to call before getting OOB, PT Consult for assist device competence, Strengthening exercises (ROM-active/passive), Bed/chair exit alarm, PT Consult for mobility concerns, Utilize walker, cane, or other assistive device, Utilize gait belt for transfers/ambulation    Mentation Interventions: Adequate sleep, hydration, pain control, Bed/chair exit alarm, Door open when patient unattended, Eyeglasses and hearing aids, Evaluate medications/consider consulting pharmacy, Familiar objects from home, Family/sitter at bedside, Gait belt with transfers/ambulation, Update white board, Toileting rounds, Room close to nurse's station, Reorient patient, More frequent rounding, Increase mobility    Medication Interventions: Assess postural VS orthostatic hypotension, Bed/chair exit alarm, Evaluate medications/consider consulting pharmacy, Patient to call before getting OOB, Utilize gait belt for transfers/ambulation, Teach patient to arise slowly    Elimination Interventions: Bed/chair exit alarm, Call light in reach, Elevated toilet seat, Patient to call for help with toileting needs, Stay With Me (per policy), Toilet paper/wipes in reach, Toileting schedule/hourly rounds    History of Falls Interventions: Bed/chair exit alarm, Consult care management for discharge planning, Door open when patient unattended, Evaluate medications/consider consulting pharmacy, Room close to nurse's station, Utilize gait belt for transfer/ambulation, Vital signs minimum Q4HRs X 24 hrs (comment for end date), Assess for delayed presentation/identification of injury for 48 hrs (comment for end date)         Problem: Patient Education: Go to Patient Education Activity  Goal: Patient/Family Education  Outcome: Progressing Towards Goal     Problem: Pressure Injury - Risk of  Goal: *Prevention of pressure injury  Description: Document Greg Scale and appropriate interventions in the flowsheet. Outcome: Progressing Towards Goal  Note: Pressure Injury Interventions:  Sensory Interventions: Assess changes in LOC, Assess need for specialty bed, Avoid rigorous massage over bony prominences, Chair cushion, Check visual cues for pain, Float heels, Keep linens dry and wrinkle-free, Discuss PT/OT consult with provider, Maintain/enhance activity level, Minimize linen layers, Monitor skin under medical devices, Pad between skin to skin, Pressure redistribution bed/mattress (bed type)    Moisture Interventions: Absorbent underpads, Apply protective barrier, creams and emollients, Check for incontinence Q2 hours and as needed, Contain wound drainage, Assess need for specialty bed, Limit adult briefs, Minimize layers, Moisture barrier, Offer toileting Q_hr, Maintain skin hydration (lotion/cream), Internal/External urinary devices    Activity Interventions: Assess need for specialty bed, Chair cushion, Increase time out of bed, Pressure redistribution bed/mattress(bed type), PT/OT evaluation    Mobility Interventions: Assess need for specialty bed, Chair cushion, Float heels, HOB 30 degrees or less, Pressure redistribution bed/mattress (bed type), PT/OT evaluation, Turn and reposition approx.  every two hours(pillow and wedges)    Nutrition Interventions: Document food/fluid/supplement intake, Discuss nutritional consult with provider, Offer support with meals,snacks and hydration    Friction and Shear Interventions: Apply protective barrier, creams and emollients, Feet elevated on foot rest, Foam dressings/transparent film/skin sealants, HOB 30 degrees or less, Lift sheet, Lift team/patient mobility team, Minimize layers                Problem: Patient Education: Go to Patient Education Activity  Goal: Patient/Family Education  Outcome: Progressing Towards Goal     Problem: Patient Education: Go to Patient Education Activity  Goal: Patient/Family Education  Outcome: Progressing Towards Goal     Problem: Patient Education: Go to Patient Education Activity  Goal: Patient/Family Education  Outcome: Progressing Towards Goal

## 2021-07-06 NOTE — PROGRESS NOTES
End of Shift Note    Bedside shift change report given to [address] [facility] (oncoming nurse) by Eamon Pardo RN (offgoing nurse). Report included the following information SBAR, Kardex, Intake/Output, MAR, Recent Results and Cardiac Rhythm nsr    Shift worked:  2346-2672     Shift summary and any significant changes:     urine culture and procalcitonin level sent. MRI completed. VS stable     Concerns for physician to address:       Zone phone for oncoming shift:   [phone]       Activity:  Activity Level: Up with Assistance  Number times ambulated in hallways past shift: 0  Number of times OOB to chair past shift: 0    Cardiac:   Cardiac Monitoring: Yes      Cardiac Rhythm: Sinus Rhythm    Access:   Current line(s): PIV     Genitourinary:   Urinary status: incontinent and external catheter    Respiratory:   O2 Device: Nasal cannula  Chronic home O2 use?: NO  Incentive spirometer at bedside: YES     GI:  Last Bowel Movement Date: 07/05/21  Current diet:  ADULT DIET Regular  Passing flatus: YES  Tolerating current diet: YES       Pain Management:   Patient states pain is manageable on current regimen: YES    Skin:  Greg Score: 15  Interventions: turn team, float heels, increase time out of bed, foam dressing, PT/OT consult, limit briefs, internal/external urinary devices and nutritional support     Patient Safety:  Fall Score:  Total Score: 5  Interventions: bed/chair alarm, assistive device (walker, cane, etc), gripper socks, pt to call before getting OOB and stay with me (per policy)  High Fall Risk: Yes    Length of Stay:  Expected LOS: 4d 2h  Actual LOS: 5      Aretha Baker RN

## 2021-07-06 NOTE — PROGRESS NOTES

## 2021-07-06 NOTE — PROGRESS NOTES
ORTHO - Progress Note  Post Op day: 4 Days Post-Op    Edward Zamarripa     [id number]  female    [de-identified] y.o.    1940    Admit date:2021  Date of Surgery:2021   Procedures:Procedure(s):LEFT HIP HEMIARTHROPLASTY  Surgeon:Surgeon(s) and Role:   * Tammie Sellers MD - Primary        SUBJECTIVE:     Edward Zamarripa is a [de-identified] y.o. female resting in the bed. Patient has complaints of appropriate post-op pain, tolerating PO pain medications. OBJECTIVE:       Physical Exam:  General: sleepy but alert to light touch, cooperative, no distress. Gastrointestinal:  non-distended . Cardiovascular: equal pulses in the lower extremities,  Brisk cap refill in all distal extremities   Genitourinary: Voiding independently   Respiratory: No respiratory distress   Neurological:Neurovascular exam within normal limits. Senstion intact: LE bilat. Motor: + DF/PF/EHL. Musculoskeletal: Samira's sign negative in bilateral lower extremities. Calves soft, supple, non-tender upon palpation or with passive stretch. Dressing/Wound:  Clean, dry and intact. No significant erythema or swelling.     Vital Signs:         Patient Vitals for the past 8 hrs:   BP Temp Pulse Resp SpO2   21 1928 (!) 149/76 98 °F (36.7 °C) 65 20 95 %   21 1543 (!) 152/81 97.9 °F (36.6 °C) 63 20 95 %                                          Temp (24hrs), Av.8 °F (36.6 °C), Min:97.4 °F (36.3 °C), Max:98 °F (36.7 °C)      Labs:        Recent Labs     21  0246   HCT 34.4*   HGB 11.0*     PT/OT:              ASSESSMENT / PLAN:   Active Problems:    Femoral neck fracture (HCC) (2021)       -  MRI l- spine- chronic degenerative finding- NO acute fx  -  Continue PT/OT WBAT  -  DVT prophylaxis- SCD w/ ASA 81 BID with pepcid-- Lovenox held  -  DC planning - SNF    No urgent ortho concerns- SIGN OFF- re consult as needed    Signed By: Daryl Payan PA-C

## 2021-07-06 NOTE — PROGRESS NOTES
End of Shift Note    Bedside shift change report given to Aretha (oncoming nurse) by Miller Dubon (offgoing nurse). Report included the following information SBAR, Kardex, Intake/Output and MAR    Shift worked:  Night     Shift summary and any significant changes:          Concerns for physician to address:       Zone phone for oncoming shift:   [id number]       Activity:  Activity Level: Up with Assistance  Number times ambulated in hallways past shift: 0  Number of times OOB to chair past shift: 0    Cardiac:   Cardiac Monitoring: Yes      Cardiac Rhythm: Sinus Rhythm    Access:   Current line(s): PIV     Genitourinary:   Urinary status: voiding and external catheter    Respiratory:   O2 Device: Nasal cannula  Chronic home O2 use?: NO  Incentive spirometer at bedside: YES     GI:  Last Bowel Movement Date: 07/05/21  Current diet:  ADULT DIET Regular  Passing flatus: YES  Tolerating current diet: YES       Pain Management:   Patient states pain is manageable on current regimen: YES    Skin:  Greg Score: 15  Interventions: float heels, increase time out of bed, foam dressing, PT/OT consult and internal/external urinary devices    Patient Safety:  Fall Score:  Total Score: 5  Interventions: bed/chair alarm, assistive device (walker, cane, etc), gripper socks and pt to call before getting OOB  High Fall Risk: Yes    Length of Stay:  Expected LOS: - - -  Actual LOS: [facility]

## 2021-07-06 NOTE — PROGRESS NOTES
Problem: Self Care Deficits Care Plan (Adult)  Goal: *Acute Goals and Plan of Care (Insert Text)  Description:   FUNCTIONAL STATUS PRIOR TO ADMISSION: Patient was modified independent using a rolling walker for functional mobility. Patient assist for basic and instrumental ADLs. Patient required assist for LB ADLs and bathing. Patient may be unreliable historian secondary to confusion. HOME SUPPORT: The patient lived with daughter and son-in-law. Occupational Therapy Goals  Initiated 7/3/2021  1. Patient will perform grooming with supervision/set-up within 7 day(s). 2.  Patient will perform upper body dressing with supervision/set-up within 7 day(s). 3.  Patient will perform lower body dressing using AE PRN with supervision/set-up within 7 day(s). 4.  Patient will perform toilet transfers with contact guard assist within 7 day(s). 5.  Patient will perform all aspects of toileting with minimal assistance/contact guard assist within 7 day(s). OCCUPATIONAL THERAPY TREATMENT  Patient: Ana Persaud (89 y.o. female)  Date: 7/6/2021  Diagnosis: Femoral neck fracture ([facility] [address] [S72.009A] <principal problem not specified>  Procedure(s) (LRB):  LEFT HIP HEMIARTHROPLASTY (Left) 4 Days Post-Op  Precautions: Fall, WBAT  Chart, occupational therapy assessment, plan of care, and goals were reviewed. ASSESSMENT  Patient continues with skilled OT services and was drowsy this session and was unable to stay fully awake for session. She did report feeling uncomfortable in the bed. Hand over hand total assist needed for combing hair at bed level. Total assist to reposition in bed. Pt was given tramadol prior to session and I am unusure if this was causing pt to be drowsy. On previous sessions pt has been needing significant assist with mobiltiy and ADLS which is not pts baseline. Continue to recommend SNF for rehab at discharge.  Noted that pt is pending MRI due to new findings of compression fractures and pending findings pt may be appropriate for a kyphoplasty. Current Level of Function Impacting Discharge (ADLs): total assist repositioning in bed (unsafe to attempt EOB due to drowsiness), total assist for combing hair at bed level             PLAN :  Patient continues to benefit from skilled intervention to address the above impairments. Continue treatment per established plan of care to address goals. Recommend with staff: frequent turns    Recommend next OT session: mobility, ADLs    Recommendation for discharge: (in order for the patient to meet his/her long term goals)  Therapy up to 5 days/week in SNF setting    This discharge recommendation:  Has been made in collaboration with the attending provider and/or case management    IF patient discharges home will need the following DME: needs rehab       SUBJECTIVE:   Patient stated I am uncomfortable.     OBJECTIVE DATA SUMMARY:   Cognitive/Behavioral Status:  Neurologic State: Drowsy; Confused  Orientation Level: Oriented to person;Oriented to place;Oriented to situation;Disoriented to time  Cognition: Decreased attention/concentration;Decreased command following     Perseveration: No perseveration noted       Functional Mobility and Transfers for ADLs:  Bed Mobility:  Rolling: Total assistance        ADL Intervention:       Grooming  Brushing/Combing Hair: Total assistance (dependent)      Activity Tolerance:   Poor    After treatment patient left in no apparent distress:   Supine in bed, Heels elevated for pressure relief, and Call bell within reach    COMMUNICATION/COLLABORATION:   The patients plan of care was discussed with: Registered nurse.      JERI Collier/L  Time Calculation: 8 mins

## 2021-07-07 VITALS
WEIGHT: 200 LBS | DIASTOLIC BLOOD PRESSURE: 69 MMHG | BODY MASS INDEX: 35.44 KG/M2 | SYSTOLIC BLOOD PRESSURE: 162 MMHG | TEMPERATURE: 98.3 F | OXYGEN SATURATION: 97 % | HEIGHT: 63 IN | RESPIRATION RATE: 18 BRPM | HEART RATE: 60 BPM

## 2021-07-07 LAB
ERYTHROCYTE [DISTWIDTH] IN BLOOD BY AUTOMATED COUNT: 14.2 % (ref 11.5–14.5)
HCT VFR BLD AUTO: 35.4 % (ref 35–47)
HGB BLD-MCNC: 11.2 G/DL (ref 11.5–16)
MCH RBC QN AUTO: 31.9 PG (ref 26–34)
MCHC RBC AUTO-ENTMCNC: 31.6 G/DL (ref 30–36.5)
MCV RBC AUTO: 100.9 FL (ref 80–99)
NRBC # BLD: 0 K/UL (ref 0–0.01)
NRBC BLD-RTO: 0 PER 100 WBC
PLATELET # BLD AUTO: 309 K/UL (ref 150–400)
PMV BLD AUTO: 9.1 FL (ref 8.9–12.9)
RBC # BLD AUTO: 3.51 M/UL (ref 3.8–5.2)
WBC # BLD AUTO: 8.3 K/UL (ref 3.6–11)

## 2021-07-07 PROCEDURE — 74011250637 HC RX REV CODE- 250/637: Performed by: PHYSICIAN ASSISTANT

## 2021-07-07 PROCEDURE — 74011250637 HC RX REV CODE- 250/637: Performed by: ORTHOPAEDIC SURGERY

## 2021-07-07 PROCEDURE — 36415 COLL VENOUS BLD VENIPUNCTURE: CPT

## 2021-07-07 PROCEDURE — 85027 COMPLETE CBC AUTOMATED: CPT

## 2021-07-07 PROCEDURE — 94760 N-INVAS EAR/PLS OXIMETRY 1: CPT

## 2021-07-07 PROCEDURE — 77010033678 HC OXYGEN DAILY

## 2021-07-07 PROCEDURE — 74011250637 HC RX REV CODE- 250/637: Performed by: INTERNAL MEDICINE

## 2021-07-07 PROCEDURE — 74011000250 HC RX REV CODE- 250: Performed by: INTERNAL MEDICINE

## 2021-07-07 RX ORDER — NITROFURANTOIN 25; 75 MG/1; MG/1
100 CAPSULE ORAL 2 TIMES DAILY
Qty: 4 CAPSULE | Refills: 0 | Status: SHIPPED | OUTPATIENT
Start: 2021-07-07 | End: 2021-07-12

## 2021-07-07 RX ORDER — ACETAMINOPHEN 325 MG/1
650 TABLET ORAL
Qty: 30 TABLET | Refills: 0 | Status: SHIPPED | OUTPATIENT
Start: 2021-07-07 | End: 2021-07-22

## 2021-07-07 RX ORDER — ASPIRIN 81 MG/1
81 TABLET ORAL 2 TIMES DAILY
Qty: 60 TABLET | Refills: 0 | Status: SHIPPED | OUTPATIENT
Start: 2021-07-07 | End: 2021-08-06

## 2021-07-07 RX ADMIN — FERROUS SULFATE TAB 325 MG (65 MG ELEMENTAL FE) 325 MG: 325 (65 FE) TAB at 09:24

## 2021-07-07 RX ADMIN — ACETAMINOPHEN 650 MG: 325 TABLET ORAL at 09:27

## 2021-07-07 RX ADMIN — POLYETHYLENE GLYCOL 3350 17 G: 17 POWDER, FOR SOLUTION ORAL at 09:24

## 2021-07-07 RX ADMIN — Medication 1 AMPULE: at 09:27

## 2021-07-07 RX ADMIN — PHENYTOIN SODIUM 200 MG: 100 CAPSULE ORAL at 09:24

## 2021-07-07 RX ADMIN — Medication 1 TABLET: at 09:25

## 2021-07-07 RX ADMIN — ATORVASTATIN CALCIUM 20 MG: 20 TABLET, FILM COATED ORAL at 09:24

## 2021-07-07 RX ADMIN — FAMOTIDINE 20 MG: 20 TABLET ORAL at 09:25

## 2021-07-07 RX ADMIN — ASPIRIN 81 MG: 81 TABLET, COATED ORAL at 09:24

## 2021-07-07 RX ADMIN — Medication 10 ML: at 06:26

## 2021-07-07 RX ADMIN — DILTIAZEM HYDROCHLORIDE 240 MG: 240 CAPSULE, COATED, EXTENDED RELEASE ORAL at 09:25

## 2021-07-07 RX ADMIN — METOPROLOL SUCCINATE 50 MG: 50 TABLET, EXTENDED RELEASE ORAL at 09:25

## 2021-07-07 RX ADMIN — NITROFURANTOIN (MONOHYDRATE/MACROCRYSTALS) 100 MG: 75; 25 CAPSULE ORAL at 09:24

## 2021-07-07 RX ADMIN — CITALOPRAM HYDROBROMIDE 40 MG: 20 TABLET ORAL at 09:25

## 2021-07-07 NOTE — PROGRESS NOTES
[facility] 52                                                                        [de-identified] y.o.   female    [address]   Room: [facility] 3 ORTHOPEDICS  Unit Phone# :  [phone]  [facility] [address]  Dept: [phone]  Loc: [phone]                    SITUATION     Admitted:  7/1/2021         Attending Provider:  Alejandro Kennedy MD       Consultations:  None    PCP:  Evette Almeida MD   [phone]    Treatment Team: Attending Provider: Alejandro Kennedy MD; Consulting Provider: Guillermo Ann MD; Utilization Review: Kirt Coreas, RN; Care Manager: Benjamín Kasper; Utilization Review: Jenniffer Torres, RN; Utilization Review: Cecilio Palmer, RN; Care Manager: Loral Sever    Admitting Dx: Femoral neck fracture ([facility] [address] [S72.009A]       Principal Problem: <principal problem not specified>    5 Days Post-Op of   Procedure(s):  LEFT HIP HEMIARTHROPLASTY   BY: Guillermo Ann MD             ON: 7/2/2021                  Code Status: Full Code                Advance Directives:   Advance Care Planning 7/2/2021   Confirm Advance Directive Yes, not on file    (Send w/patient)   Not Received       Isolation:  There are currently no Active Isolations       MDRO: No current active infections    Pain Medications given:  Tylenol    Last dose: 7/7/2021 at  [address] needed: yes  Type of equipment: Hedwig Blotter      (Not currently on dialysis)  (Not currently on dialysis)  (Not currently on dialysis)     BACKGROUND     Allergies:   Allergies   Allergen Reactions    Codeine Rash     Doesn't do well with morphine and its derivatives, tolerates tramadol    Penicillins Swelling       Past Medical History:   Diagnosis Date    Arthritis     Bladder disorder     Cancer [facility])     breast cancer    Gastrointestinal disorder     acid reflux    GERD (gastroesophageal reflux disease)     High cholesterol     Hypertension     Other ill-defined conditions(799.89)     pneumonia    Psychiatric disorder     depression    Seizures ([facility] [address]     Stroke ([facility] [address]     Thromboembolus ([facility] [address]        Past Surgical History:   Procedure Laterality Date    HX CATARACT REMOVAL      bilateral    HX  SECTION      x 3    HX CHOLECYSTECTOMY      HX ORTHOPAEDIC      left arm, left leg, right wrist    HX ORTHOPAEDIC  13    ELBOW OPEN REDUCTION INTERNAL FIXATION (Right    HX OTHER SURGICAL      Brain surgery s/p CVA    HX OTHER SURGICAL      Bladder stimulator 12    HX OTHER SURGICAL      gastric bypass    WA BREAST SURGERY PROCEDURE UNLISTED      leftt breast lumpectomy       Medications Prior to Admission   Medication Sig    metoprolol succinate (TOPROL-XL) 50 mg XL tablet Take 50 mg by mouth daily.  phenytoin ER (DILANTIN ER) 100 mg ER capsule Take 200 mg by mouth two (2) times a day.  citalopram (CELEXA) 40 mg tablet Take 40 mg by mouth daily.  diltiazem CD (CARTIA XT) 240 mg ER capsule Take 240 mg by mouth daily.  aspirin delayed-release 81 mg tablet Take 1 Tab by mouth daily.  ferrous sulfate (IRON) 325 mg (65 mg Iron) tablet Take 65 mg by mouth two (2) times a day.  simvastatin (ZOCOR) 40 mg tablet Take 40 mg by mouth nightly. Hard scripts included in transfer packet no    Vaccinations:    Immunization History   Administered Date(s) Administered    (RETIRED) Pneumococcal Vaccine (Unspecified Type) 04/15/2011    Influenza Vaccine (Madin Cynthia Canine Kidney) PF 2014       Readmission Risks:    Known Risks: The Charlson CoMorbitiy Index tool is an evidenced based tool that has more automatic generated information. The tool looks at many different items such as the age of the patient, how many times they were admitted in the last calendar year, current length of stay in the hospital and their diagnosis.  All of these items are pulled automatically from information documented in the chart from various places and will generate a score that predicts whether a patient is at low (less than 13), medium (13-20) or high (21 or greater) risk of being readmitted. ASSESSMENT                Temp: 98.3 °F (36.8 °C) (07/07/21 1133) Pulse (Heart Rate): 60 (07/07/21 1133)     Resp Rate: 18 (07/07/21 1133)           BP: (!) 162/69 (07/07/21 1133)     O2 Sat (%): 97 % (07/07/21 1133)     Weight: 90.7 kg (200 lb)    Height: 5' 3\" (160 cm) (07/02/21 1000)       If above not within 1 hour of discharge:    BP:_____  P:____  R:____ T:_____ O2 Sat: ___%  O2: ______    Active Orders   Diet    ADULT DIET Regular         Orientation: disoriented     Active Behaviors: None                                   Active Lines/Drains:  (Peg Tube / Xavier / CL or S/L?): no    Urinary Status: Voiding, External catheter     Last BM: Last Bowel Movement Date: 07/07/21     Skin Integrity: Excoriation, Incision (comment)             Mobility: Very limited   Weight Bearing Status: WBAT (Weight Bearing as Tolerated)                Lab Results   Component Value Date/Time    Glucose 87 07/05/2021 02:46 AM    Hemoglobin A1c 5.1 04/15/2011 03:15 AM    INR 1.1 07/01/2021 02:49 PM    INR 4.3 (H) 02/26/2012 04:00 AM    HGB 11.2 (L) 07/07/2021 09:58 AM    HGB 11.0 (L) 07/05/2021 02:46 AM        RECOMMENDATION     See After Visit Summary (AVS) for:  · Discharge instructions  · After [address]   · Special equipment needed (entered pre-discharge by Care Management)  · Medication Reconciliation    · Follow up Appointment(s)         Report given/sent by: Trell Ledesma RN                    Verbal report given to: Breann?   FAXED to:          Estimated discharge time:  7/7/2021 at 1200

## 2021-07-07 NOTE — PROGRESS NOTES
Bedside and Verbal shift change report given to Eliana RN (oncoming nurse) by Genevieve Rubio RN (offgoing nurse). Report included the following information SBAR, Kardex, Intake/Output, MAR, Med Rec Status and Cardiac Rhythm NSR.

## 2021-07-07 NOTE — DISCHARGE INSTRUCTIONS
Discharge Instructions: How to [address]  Surgery: Hip Fracture Repair  Surgeon:   Dio Lyles MD  Surgery Date:  7/2/2021     To relieve pain:   Use ice/gel packs.  Put the ice pack directly over the wound, or anywhere you are hurting or swollen.  To control pain and swelling, keep ice on regularly, especially after physical activity.  The packs should stay cold for 3-4 hours. When it is not cold anymore, rotate with the packs in the freezer.  Elevate your leg. This will also keep swelling down.  Rest for at least 20 minutes between activity or exercises.  To keep track of your pain medications, write down what you take and when you take it.  The last dose of pain medication you got in the hospital was:     Medication    Dose    Date & Time           Choose your medications based on the pain scale below:     To keep your pain under control, take Tylenol every 6 hours for 14 days - even if you feel like you dont need it.  For mild to moderate pain (1-6 on pain scale), take one pain pill every 3-4 hours as needed.  For severe pain (7-10 on pain scale), take two pain pills every 3-4 hours as needed.  To prevent nausea, take your pain medications with food. Pain Scale                 As your pain lessens:     Slowly start taking less pain medication. You may do this by waiting longer between doses or by taking smaller doses.  Stop using the pain medications as soon as you no longer need it, usually in 2-3 weeks.  Aspirin   To prevent blood clots, you will need to take Aspirin 81 mg twice a day for 30 days.  To prevent stomach upset or bleeding:   Take Pepcid 20 mg twice a day, or a similar home medication, while you are taking a blood thinner.    Do not take non-steroidal anti-inflammatory (NSAID) medications (Ibuprofen, Advil, Motrin, Naproxen, etc.)                                                 OPSITE (Honeycomb dressing)     Keep your clear, waterproof dressing in place for 5-7 days after your leave the hospital.      If you are still having drainage, you will need to change your dressing in 5-7 days. You will be given one extra dressing to use at home.  If there is no more drainage from the wound, you may leave it open to the air. OPSITE DRESSING INSTRUCTIONS                 DO NOTs:   Do not rub your surgical wound   Do not put lotion or oils on your wound.  Do not take a tub bath or go swimming until your doctor says it is ok.  You may shower with this dressing over your wound.  After showering pat the dressing dry.  If you have staples a home health nurse will remove them in about 10 days.  To increase and promote healing:     Stop Smoking (or at least cut back on       Smoking).  Eat a well-balanced diet (high in protein       and vitamin C).  If you have a poor appetite, drink Ensure, Glucerna, or CarnationInstant Breakfast for the next 30 days.  If you are diabetic, you should control your blood         sugars to prevent infection and help your wound         to heal.       To prevent constipation, stay active & drink plenty of fluid.  While using pain medications, you should also take stool softeners and laxatives, such as Pericolace and Miralax.  If you are having too many bowel movements, then you may need  to stop taking the laxatives.  You should have a bowel movement 3-4 days after surgery and then at least every other day while on pain medication.  To improve your recovery, you must be active!  WEIGHT BEARING   As Tolerated = You can put as much weight on your leg as you can tolerate while walking.          THERAPY   If you go to a rehab facility, physical therapy (PT) will need to work with you daily, and sometimes twice a day to teach you how to:     Get in and out of the bed   Walk (gait training) and climb stairs   Do exercises and gain strength   Use a walker, crutches or cane     You may also need to have occupational therapy (OT) work with you to help you practice:     Getting on and off the toilet   Self-care (brushing teeth, eating, bathing, etc)     If you go directly home, home health physical therapy will come the day after you leave the hospital.  They will visit about 4 times over the next couple of weeks to teach you how to get out of bed, to safely walk in your home, and to do your exercises.  NO DRIVING until your surgeon tells you it is ok.  You can return to work when cleared by a physician.  Please call your physician immediately if you have:     Constant bleeding from your wound.  Increasing redness or swelling around your wound (Some warmth, bruising and swelling is normal).  Change in wound drainage (increase in amount, color, or bad smell).  Change in mental status (unusual behavior)     Temperature over 101.5 degrees Fahrenheit     Increased pain, swelling, or redness in the calf (back of your lower leg), thigh, ankle or foot.  Emergency: CALL 911 if you have:   Shortness of breath   Chest pain when you cough or taking a deep breath     Please call your surgeons office at [address] [place] [address] for a follow up appointment.  You should call as soon as you get home or the next day after discharge. Ask to make an appointment in 2 weeks.                    Patient Discharge Instructions    Jenny Gallardo / [id number] : 1940    Admitted 2021 Discharged: 2021         DISCHARGE DIAGNOSIS:   Acute left femoral neck fracture POA  Ground-level mechanical fall  Abnormal urine analysis concern for UTI  Acute low back pain  Hypertension: Continue metoprolol  Chronic atrial fibrilation, on metoprolol  History of seizure: Continue home phenytoin          Take Home Medications     {Medication reconciliation information is now added to the patient's AVS automatically when it is printed. There is no need to use this SmartLink in discharge instructions. Highlight this text and delete it to clear this message}      General drug facts      If you have a very bad allergy, wear an allergy ID at all times.  It is important that you take the medication exactly as they are prescribed.  Keep your medication in the bottles provided by the pharmacist.  [place] Section a list of all your drugs (prescription, natural products, vitamins, OTC) with you. Give this list to your doctor.  Do not take other medications without consulting your doctor.   Do not share your drugs with others and do not take anyone else's drugs.  Keep all drugs out of the reach of children and pets.   Most drugs may be thrown away in household trash after mixing with coffee grounds or rob litter and sealing in a plastic bag.   Keep a list Call your doctor for help with any side effects. If in the [place] you may also call the FDA at 9-922-NKE-1745     Talk with the doctor before starting any new drug, including OTC, natural products, or vitamins. What to do at Home    1. Recommended diet: Cardiac    2. Recommended activity: as above    3. If you experience any of the following symptoms then please call your primary care physician or return to the emergency room if you cannot get hold of your doctor:    4. Wound Care: as above    5. Lab work: Cbc and Bmp in 1 week     6. Bring these papers with you to your follow up appointments. The papers will help your doctors be sure to continue the care plan from the hospital.      If you have questions regarding the hospital related prescriptions or hospital related issues please call [facility] at [phone].  You can always direct your questions to your primary care doctor if you are unable to reach your hospital physician; your PCP works as an extension of your hospital doctor just like your hospital doctor is an extension of your PCP for your time at the hospital [facility])      Follow-up with:   PCP: Alberto Mcmanus MD  Follow-up Information     Follow up With Specialties Details Why Contact Info    [address] [facility],[address] of [address]    Shayy Jackson MD Orthopedic Surgery In 2 weeks For Follow up after surgery [address] [facility]      Alberto Mcmanus MD Family Medicine   [address] [place] [address] [address] [phone]      Alberto Mcmanus MD Family Medicine Schedule an appointment as soon as possible for a visit in 1 week  [address] [place] [address] [address]      Shayy Jackson MD Orthopedic Surgery Schedule an appointment as soon as possible for a visit in 1 week  [address] [id number] [phone]             Please call for your own appointment        Information obtained by :  I understand that if any problems occur once I am at home I am to contact my physician. I understand and acknowledge receipt of the instructions indicated above.                                                                                                                                            Physician's or R.N.'s Signature                                                                  Date/Time                                                                                                                                              Patient or Representative Signature                                                          Date/Time

## 2021-07-07 NOTE — PROGRESS NOTES
Transition of Care Plan:  RUR:16%   Disposition:SNF-[facility] (insurance auth obtained 7/6)  Follow up appointments:PCP and specialist  DME needed:No DME needed- pt going to SNF  Transportation at Discharge:[facility] @ 12:00pm  Keys or means to access home: n/a pt going to snf IM Medicare letter: provided  Caregiver Contact:Rosa Elena Mendoza dtr [phone]  Discharge Caregiver contacted prior to discharge? yes    [facility] @ 12:00pm  Call report     CM sent perfect serve message to MD inquiring if pt is ready for d/c today. MD confirmed. CM spoke with [address] [place] [address] who confirmed they can accept patient today. Insurance [place] was obtained yesterday, CM provided [address] [place] [address] with auth information. [facility] arranged for 12:00pm. CM made room visit with patient and daughter at bedside. CM informed pt and dtr of d/c today, both agreed with plan and transport time. Dtr reported she would inform the rest of the family. Bedside RN and [facility] aware of transport time. Medicare pt has received, reviewed, and signed 2nd  letter informing them of their right to appeal the discharge. Signed copy has been placed on pt bedside chart. Transition of Care Plan to SNF/Rehab    SNF/Rehab Transition:  Patient has been accepted to [address] [place] [address] and meets criteria for admission. Patient will transported by [facility] and expected to leave at 12:00pm.    Communication to Patient/Family:  Met with patient and daughter (identified care giver) and they are agreeable to the transition plan. Communication to SNF/Rehab:  Bedside RN, Gamal Youssef, has been notified to update the transition plan to the facility and call report (phone number [phone]). Discharge information has been updated on the AVS.     Discharge instructions to be fax'd to facility at [facility] # [phone]). Nursing Please include all hard scripts for controlled substances, med rec and dc summary, and AVS in packet.      Reviewed and confirmed with facility, [address] [place] [address], can manage the patient care needs for the following:     Francisco Cardona with (X) only those applicable:    Medication:  [x]  Medications will be available at the facility  []  IV Antibiotics  [x]  Controlled Substance - hard copy to be sent with patient   []  Weekly Labs   Documents:  [x] Hard RX  [x] MAR  [x] Kardex  [x] AVS  [x]Transfer Summary  [x]Discharge   Equipment:  []  CPAP/BiPAP  []  Wound Vacuum  []  Xavier or Urinary Device  []  PICC/Central Line  []  Nebulizer  []  Ventilator   Treatment:  []Isolation (for MRSA, VRE, etc.)  []Surgical Drain Management  []Tracheostomy Care  []Dressing Changes  []Dialysis with transportation and chair time. []PEG Care  [x]Oxygen 2L O2 NC  []Daily Weights for Heart Failure   Dietary:  []Any diet limitations  []Tube Feedings   []Total Parenteral Management (TPN)   Eligible for Medicaid Long Term Services and Supports  Yes:  [] Eligible for medical assistance or will become eligible within 180 days and UAI completed. [] Provider/Patient and/or support system has requested screening. [] UAI copy provided to patient or responsible party. [] UAI unavailable at discharge will send once processed to SNF provider. [] UAI unavailable at discharged mailed to patient  No:   [] Private pay and is not financially eligible for Medicaid within the next 180 days. [] Reside out-of-state. [] A residents of a state owned/operated facility that is licensed  by [place] [address] [employer] or [facility]  [] Enrollment in [facility] hospice services  [] [address]  [x] Patient /Family declines to have screening completed or provide financial information for screening     Financial Resources:  Medicaid    [] Initiated and application pending   [] Full coverage     Advanced Care Plan:  []Surrogate Decision Maker of Care  []POA  [x]Communicated Code Status full   Other     Care Management Interventions  PCP Verified by CM:  Yes Myriam Mensah Aretha [place]  Mode of Transport at Discharge: BLS ([facility] )  Transition of Care Consult (CM Consult): Discharge Planning, SNF (Pt lives alone but the recommendation is for SNF)  Discharge Durable Medical Equipment: No (Wheelchair, Rolling Walker, Shower Chair, and [employer])  Physical Therapy Consult: Yes  Occupational Therapy Consult: Yes  Speech Therapy Consult: No  Current Support Network: Family Lives Nearby, Other (Family is supportive and involved)  Confirm Follow Up Transport: Family (Pt's dtr transport pt to her medical appointments)  The Plan for Transition of Care is Related to the Following Treatment Goals : SNF  The Patient and/or Patient Representative was Provided with a Choice of Provider and Agrees with the Discharge Plan?: Yes  Name of the Patient Representative Who was Provided with a Choice of Provider and Agrees with the Discharge Plan: Pt's dtr  Freedom of Choice List was Provided with Basic Dialogue that Supports the Patient's Individualized Plan of Care/Goals, Treatment Preferences and Shares the Quality Data Associated with the Providers?: Yes  Discharge Location  Discharge Placement: Skilled nursing facility    [place], [address] [facility], [address] [facility]

## 2021-07-07 NOTE — PROGRESS NOTES
End of Shift Note    Bedside shift change report given to Ti Stephens RN (oncoming nurse) by Lane Underwood (offgoing nurse). Report included the following information SBAR, Kardex, Intake/Output, MAR, Recent Results and Med Rec Status    Shift worked:  2418-3906     Shift summary and any significant changes:     No Changes   Concerns for physician to address:  none     Zone phone for oncoming shift:           Activity:  Activity Level: Up with Assistance  Number times ambulated in hallways past shift: 0  Number of times OOB to chair past shift: 0    Cardiac:   Cardiac Monitoring: Yes      Cardiac Rhythm: Sinus Gordy    Access:   Current line(s): PIV     Genitourinary:   Urinary status: incontinent and external catheter    Respiratory:   O2 Device: Nasal cannula  Chronic home O2 use?: YES  Incentive spirometer at bedside: NO     GI:  Last Bowel Movement Date: 07/05/21  Current diet:  ADULT DIET Regular  Passing flatus: YES  Tolerating current diet: YES       Pain Management:   Patient states pain is manageable on current regimen: YES    Skin:  Greg Score: 15  Interventions: turn team    Patient Safety:  Fall Score:  Total Score: 5  Interventions: bed/chair alarm, assistive device (walker, cane, etc) and gripper socks  High Fall Risk: Yes    Length of Stay:  Expected LOS: 4d 2h  Actual LOS: [address]

## 2021-07-07 NOTE — PROGRESS NOTES
Pharmacist Discharge Medication Reconciliation    Significant PMH:   Past Medical History:   Diagnosis Date    Arthritis     Bladder disorder     Cancer ([facility]     breast cancer    Gastrointestinal disorder     acid reflux    GERD (gastroesophageal reflux disease)     High cholesterol     Hypertension     Other ill-defined conditions(799.89)     pneumonia    Psychiatric disorder     depression    Seizures ([facility]     Stroke ([facility]     Thromboembolus ([facility]      Encounter Diagnoses   Name Primary? Closed fracture of left hip, initial encounter ([facility]) Yes    Leukocytosis, unspecified type     Diarrhea, unspecified type        Allergies: Codeine and Penicillins    Discharge Medications:   Current Discharge Medication List        START taking these medications    Details   nitrofurantoin, macrocrystal-monohydrate, (MACROBID) 100 mg capsule Take 1 Capsule by mouth two (2) times a day for 2 days. Qty: 4 Capsule, Refills: 0  Start date: 7/7/2021, End date: 7/9/2021      acetaminophen (TylenoL) 325 mg tablet Take 2 Tablets by mouth every four (4) hours as needed for Pain for up to 15 days. Qty: 30 Tablet, Refills: 0  Start date: 7/7/2021, End date: 7/22/2021           CONTINUE these medications which have CHANGED    Details   aspirin delayed-release 81 mg tablet Take 1 Tablet by mouth two (2) times a day for 30 days. Qty: 60 Tablet, Refills: 0  Start date: 7/7/2021, End date: 8/6/2021           CONTINUE these medications which have NOT CHANGED    Details   metoprolol succinate (TOPROL-XL) 50 mg XL tablet Take 50 mg by mouth daily. phenytoin ER (DILANTIN ER) 100 mg ER capsule Take 200 mg by mouth two (2) times a day. citalopram (CELEXA) 40 mg tablet Take 40 mg by mouth daily. diltiazem CD (CARTIA XT) 240 mg ER capsule Take 240 mg by mouth daily. ferrous sulfate (IRON) 325 mg (65 mg Iron) tablet Take 65 mg by mouth two (2) times a day. simvastatin (ZOCOR) 40 mg tablet Take 40 mg by mouth nightly. The patient's chart, MAR and AVS were reviewed by Ab Holland [facility].     Discharging Provider: Yohana Hope MD      Thank you,     Ab Holland, [facility]

## 2021-07-07 NOTE — PROGRESS NOTES
Bedside and Verbal shift change report given to Eliana RN (oncoming nurse) by Alen Blankenship RN (offgoing nurse). Report included the following information SBAR, Kardex, OR Summary, Procedure Summary, Intake/Output, MAR and Recent Results.

## 2021-07-07 NOTE — PROGRESS NOTES
Spiritual Care Assessment/Progress Note  [facility]      NAME: Johnie Liz      MRN: [id number]  AGE: [de-identified] y.o. SEX: female  Gnosticist Affiliation: Hoahaoism   Language: English     7/7/2021     Total Time (in minutes): 5     Spiritual Assessment begun in [facility] through conversation with:         []Patient        [] Family    [] Friend(s)        Reason for Consult: Initial/Spiritual assessment, patient floor     Spiritual beliefs: (Please include comment if needed)     [] Identifies with a donell tradition:         [] Supported by a donell community:            [] Claims no spiritual orientation:           [] Seeking spiritual identity:                [] Adheres to an individual form of spirituality:           [x] Not able to assess:                           Identified resources for coping:      [] Prayer                               [] Music                  [] Guided Imagery     [] Family/friends                 [] Pet visits     [] Devotional reading                         [x] Unknown     [] Other:                                               Interventions offered during this visit: (See comments for more details)    Patient Interventions: Initial visit     Family/Friend(s): Initial Assessment     Plan of Care:     [] Support spiritual and/or cultural needs    [] Support AMD and/or advance care planning process      [] Support grieving process   [] Coordinate Rites and/or Rituals    [] Coordination with community clergy   [] No spiritual needs identified at this time   [] Detailed Plan of Care below (See Comments)  [] Make referral to Music Therapy  [] Make referral to Pet Therapy     [] Make referral to Addiction services  [] Make referral to [facility]  [] Make referral to Spiritual Care Partner  [] No future visits requested        [x] Follow up upon further referrals     Comments:     Initial Spiritual Care Assessment attempt for Ms. Gomez Gerardo in [phone].  Performed chart review before the visit. Upon arrival, patient was in consult with the doctor.  respected patient's consult time. Contact  if emotional/spiritual needs arise.      [id number] [facility] Vira Wilson, M.S., Th.M.  Spiritual Care Provider   Paging Service [phone]

## 2021-07-07 NOTE — DISCHARGE SUMMARY
Hospitalist Discharge Summary     Patient ID:  Leighann Bragg  [id number]  71 y.o.  1940 7/1/2021    PCP on record: Beth Pérez MD    Admit date: 7/1/2021  Discharge date and time: 7/7/2021    DISCHARGE DIAGNOSIS:    Acute left femoral neck fracture POA  Ground-level mechanical fall  Abnormal urine analysis concern for UTI  Acute low back pain  Hypertension: Continue metoprolol  Chronic atrial fibrilation, on metoprolol  History of seizure: Continue home phenytoin    CONSULTATIONS:  None    Excerpted HPI from H&P of Hattie Canavan, MD:  Leighann Bragg is a [de-identified] y.o.  female who presents with fall and left-sided hip pain. Patient said that she has been having diarrhea over the last 1 week. Sometimes feels lightheaded. Patient suspects probably she fall down because she feels weak from the daughter. Patient's not a good historian. Tried to call daughter for no answer. Otherwise patient denied any chest pain or shortness of breath at this point. Patient denied any urinary habit changes.       ______________________________________________________________________  DISCHARGE SUMMARY/HOSPITAL COURSE:  for full details see H&P, daily progress notes, labs, consult notes. Acute left femoral neck fracture POA  Ground-level mechanical fall  -pt admitted to hospital and under went work as below  -S/p left hip hemiarthroplasty. Postoperative care per orthopedics  -Continue aspirin 81 mg twice daily for DVT prophylaxis per Ortho  -Pain control with tylenol and no narcotics due to drowsiness        Abnormal urine analysis concern for UTI  Leukocytosis likely due to UTI  -Urinalysis was abnormal on 7/1. Urine cs still pending  -Allergic to penicillin so cannot receive cephalosporins  -Cont nitrofurantoin. Follow urine culture results.        Acute low back pain  New L3 and L4 fractures  -CT abnormal with new L3 and L4 fractures with indeterminate age. MRI shows old fractures. Cont PT/OT.       Hypertension: Continue metoprolol  Chronic atrial fibrillation, on metoprolol     History of seizure: Continue home phenytoin    Pt seen and examined today, vitals are stable and lab work at "[employer] and cleared by all consultants to be discharged for out pt follow up.      Urine cs that was pending is inconclusive but pt is already on Macrobid. _______________________________________________________________________  Patient seen and examined by me on discharge day. Pertinent Findings:  Gen:    Not in distress  Chest: Clear lungs  CVS:   Regular rhythm. No edema  Abd:  Soft, not distended, not tender  Neuro:  Alert, awake  _______________________________________________________________________  DISCHARGE MEDICATIONS:   Current Discharge Medication List      START taking these medications    Details   nitrofurantoin, macrocrystal-monohydrate, (MACROBID) 100 mg capsule Take 1 Capsule by mouth two (2) times a day for 2 days. Qty: 4 Capsule, Refills: 0  Start date: 7/7/2021, End date: 7/9/2021      acetaminophen (TylenoL) 325 mg tablet Take 2 Tablets by mouth every four (4) hours as needed for Pain for up to 15 days. Qty: 30 Tablet, Refills: 0  Start date: 7/7/2021, End date: 7/22/2021         CONTINUE these medications which have CHANGED    Details   aspirin delayed-release 81 mg tablet Take 1 Tablet by mouth two (2) times a day for 30 days. Qty: 60 Tablet, Refills: 0  Start date: 7/7/2021, End date: 8/6/2021         CONTINUE these medications which have NOT CHANGED    Details   metoprolol succinate (TOPROL-XL) 50 mg XL tablet Take 50 mg by mouth daily. phenytoin ER (DILANTIN ER) 100 mg ER capsule Take 200 mg by mouth two (2) times a day. citalopram (CELEXA) 40 mg tablet Take 40 mg by mouth daily. diltiazem CD (CARTIA XT) 240 mg ER capsule Take 240 mg by mouth daily. ferrous sulfate (IRON) 325 mg (65 mg Iron) tablet Take 65 mg by mouth two (2) times a day. simvastatin (ZOCOR) 40 mg tablet Take 40 mg by mouth nightly. Patient Follow Up Instructions:     1. Recommended diet: Cardiac    2. Recommended activity: as above    3. If you experience any of the following symptoms then please call your primary care physician or return to the emergency room if you cannot get hold of your doctor:    4. Wound Care: as above    5. Lab work: Cbc and Bmp in 1 week     6. Bring these papers with you to your follow up appointments.  The papers will help your doctors be sure to continue the care plan from the hospital.        Follow-up Information     Follow up With Specialties Details Why Contact Info    [address] [place] [address],[address] [facility]    Phong Quijano MD Orthopedic Surgery In 2 weeks For Follow up after surgery [address] [place] [address]      Aylin Bedoya MD Family Medicine   [address] [place] [address]  [phone]      Aylin Bedoya MD Family Medicine Schedule an appointment as soon as possible for a visit in 1 week  [address] [place] [address] Road   [phone]      Phong Quijano MD Orthopedic Surgery Schedule an appointment as soon as possible for a visit in 1 week  [address] [place] [address]          ________________________________________________________________    Risk of deterioration: High    Condition at Discharge:  Stable  __________________________________________________________________    Disposition  SNF/LTC    ____________________________________________________________________    Code Status: Full Code  ___________________________________________________________________      Total time in minutes spent coordinating this discharge (includes going over instructions, follow-up, prescriptions, and preparing report for sign off to her PCP) :  35  minutes    Signed:  Edd Byrd MD

## 2021-07-07 NOTE — PROGRESS NOTES
Physician Progress Note      Rere Washington  CSN #:                  [id number]  :                       1940  ADMIT DATE:       2021 12:44 PM  [address] [place] DATE:  RESPONDING  PROVIDER #:        Pastor Frank TABARES          QUERY TEXT:    Pt admitted with ***. Pt noted to have ***. If possible, please document in the progress notes and discharge summary if you are evaluating and/or treating any of the following: The medical record reflects the following:  Risk Factors: Femoral neck fracture left hip, diarrhea pta  Clinical Indicators: buncr 13/0.61/60---1.  Treatment: ivfs@ 125/hr  Thanks,  Qasim Arriaga RN/CDI [id number]  Options provided:  -- Acute kidney failure  -- Acute kidney failure with acute tubular necrosis  -- Acute kidney injury  -- Other - I will add my own diagnosis  -- Disagree - Not applicable / Not valid  -- Disagree - Clinically unable to determine / Unknown  -- Refer to Clinical Documentation Reviewer    PROVIDER RESPONSE TEXT:    This patient has an Acute kidney injury.     Query created by: Nico Spencer on 2021 10:15 AM      Electronically signed by:  Pastor Frank TABARES 2021 10:32 AM

## 2021-07-08 LAB
BACTERIA SPEC CULT: NORMAL
CC UR VC: NORMAL
SERVICE CMNT-IMP: NORMAL

## 2021-07-10 ENCOUNTER — HOSPITAL ENCOUNTER (INPATIENT)
Age: 81
LOS: 2 days | Discharge: HOME HEALTH | DRG: 205 | End: 2021-07-12
Attending: EMERGENCY MEDICINE | Admitting: INTERNAL MEDICINE
Payer: MEDICARE

## 2021-07-10 ENCOUNTER — APPOINTMENT (OUTPATIENT)
Dept: GENERAL RADIOLOGY | Age: 81
DRG: 205 | End: 2021-07-10
Attending: EMERGENCY MEDICINE
Payer: MEDICARE

## 2021-07-10 ENCOUNTER — APPOINTMENT (OUTPATIENT)
Dept: CT IMAGING | Age: 81
DRG: 205 | End: 2021-07-10
Attending: INTERNAL MEDICINE
Payer: MEDICARE

## 2021-07-10 DIAGNOSIS — J96.01 ACUTE RESPIRATORY FAILURE WITH HYPOXIA (HCC): Primary | ICD-10-CM

## 2021-07-10 DIAGNOSIS — J18.9 PNEUMONIA OF BOTH LOWER LOBES DUE TO INFECTIOUS ORGANISM: ICD-10-CM

## 2021-07-10 PROBLEM — A41.9 SEPSIS (HCC): Status: ACTIVE | Noted: 2021-07-10

## 2021-07-10 LAB
ALBUMIN SERPL-MCNC: 2.3 G/DL (ref 3.5–5)
ALBUMIN/GLOB SERPL: 0.7 {RATIO} (ref 1.1–2.2)
ALP SERPL-CCNC: 110 U/L (ref 45–117)
ALT SERPL-CCNC: 19 U/L (ref 12–78)
ANION GAP SERPL CALC-SCNC: 2 MMOL/L (ref 5–15)
APPEARANCE UR: CLEAR
AST SERPL-CCNC: 50 U/L (ref 15–37)
B PERT DNA SPEC QL NAA+PROBE: NOT DETECTED
BACTERIA URNS QL MICRO: NEGATIVE /HPF
BASOPHILS # BLD: 0 K/UL (ref 0–0.1)
BASOPHILS NFR BLD: 0 % (ref 0–1)
BILIRUB SERPL-MCNC: 0.5 MG/DL (ref 0.2–1)
BILIRUB UR QL: NEGATIVE
BNP SERPL-MCNC: 988 PG/ML
BORDETELLA PARAPERTUSSIS PCR, BORPAR: NOT DETECTED
BUN SERPL-MCNC: 8 MG/DL (ref 6–20)
BUN/CREAT SERPL: 20 (ref 12–20)
C PNEUM DNA SPEC QL NAA+PROBE: NOT DETECTED
CALCIUM SERPL-MCNC: 8.3 MG/DL (ref 8.5–10.1)
CHLORIDE SERPL-SCNC: 95 MMOL/L (ref 97–108)
CO2 SERPL-SCNC: 38 MMOL/L (ref 21–32)
COLOR UR: ABNORMAL
CREAT SERPL-MCNC: 0.4 MG/DL (ref 0.55–1.02)
DIFFERENTIAL METHOD BLD: ABNORMAL
EOSINOPHIL # BLD: 0.7 K/UL (ref 0–0.4)
EOSINOPHIL NFR BLD: 8 % (ref 0–7)
EPITH CASTS URNS QL MICRO: ABNORMAL /LPF
ERYTHROCYTE [DISTWIDTH] IN BLOOD BY AUTOMATED COUNT: 14.3 % (ref 11.5–14.5)
FLUAV H1 2009 PAND RNA SPEC QL NAA+PROBE: NOT DETECTED
FLUAV H1 RNA SPEC QL NAA+PROBE: NOT DETECTED
FLUAV H3 RNA SPEC QL NAA+PROBE: NOT DETECTED
FLUAV SUBTYP SPEC NAA+PROBE: NOT DETECTED
FLUBV RNA SPEC QL NAA+PROBE: NOT DETECTED
GLOBULIN SER CALC-MCNC: 3.1 G/DL (ref 2–4)
GLUCOSE SERPL-MCNC: 112 MG/DL (ref 65–100)
GLUCOSE UR STRIP.AUTO-MCNC: NEGATIVE MG/DL
HADV DNA SPEC QL NAA+PROBE: NOT DETECTED
HCOV 229E RNA SPEC QL NAA+PROBE: NOT DETECTED
HCOV HKU1 RNA SPEC QL NAA+PROBE: NOT DETECTED
HCOV NL63 RNA SPEC QL NAA+PROBE: NOT DETECTED
HCOV OC43 RNA SPEC QL NAA+PROBE: NOT DETECTED
HCT VFR BLD AUTO: 32.4 % (ref 35–47)
HGB BLD-MCNC: 10.6 G/DL (ref 11.5–16)
HGB UR QL STRIP: NEGATIVE
HMPV RNA SPEC QL NAA+PROBE: NOT DETECTED
HPIV1 RNA SPEC QL NAA+PROBE: NOT DETECTED
HPIV2 RNA SPEC QL NAA+PROBE: NOT DETECTED
HPIV3 RNA SPEC QL NAA+PROBE: NOT DETECTED
HPIV4 RNA SPEC QL NAA+PROBE: NOT DETECTED
IMM GRANULOCYTES # BLD AUTO: 0 K/UL (ref 0–0.04)
IMM GRANULOCYTES NFR BLD AUTO: 0 % (ref 0–0.5)
KETONES UR QL STRIP.AUTO: NEGATIVE MG/DL
LACTATE BLD-SCNC: 0.86 MMOL/L (ref 0.4–2)
LEUKOCYTE ESTERASE UR QL STRIP.AUTO: NEGATIVE
LYMPHOCYTES # BLD: 0.3 K/UL (ref 0.8–3.5)
LYMPHOCYTES NFR BLD: 3 % (ref 12–49)
M PNEUMO DNA SPEC QL NAA+PROBE: NOT DETECTED
MCH RBC QN AUTO: 31.9 PG (ref 26–34)
MCHC RBC AUTO-ENTMCNC: 32.7 G/DL (ref 30–36.5)
MCV RBC AUTO: 97.6 FL (ref 80–99)
MONOCYTES # BLD: 0.7 K/UL (ref 0–1)
MONOCYTES NFR BLD: 8 % (ref 5–13)
MUCOUS THREADS URNS QL MICRO: ABNORMAL /LPF
NEUTS SEG # BLD: 7.4 K/UL (ref 1.8–8)
NEUTS SEG NFR BLD: 81 % (ref 32–75)
NITRITE UR QL STRIP.AUTO: NEGATIVE
NRBC # BLD: 0 K/UL (ref 0–0.01)
NRBC BLD-RTO: 0 PER 100 WBC
PH UR STRIP: 5.5 [PH] (ref 5–8)
PLATELET # BLD AUTO: 335 K/UL (ref 150–400)
PMV BLD AUTO: 9.1 FL (ref 8.9–12.9)
POTASSIUM SERPL-SCNC: 3.9 MMOL/L (ref 3.5–5.1)
PROT SERPL-MCNC: 5.4 G/DL (ref 6.4–8.2)
PROT UR STRIP-MCNC: NEGATIVE MG/DL
RBC # BLD AUTO: 3.32 M/UL (ref 3.8–5.2)
RBC #/AREA URNS HPF: ABNORMAL /HPF (ref 0–5)
RBC MORPH BLD: ABNORMAL
RBC MORPH BLD: ABNORMAL
RSV RNA SPEC QL NAA+PROBE: NOT DETECTED
RV+EV RNA SPEC QL NAA+PROBE: NOT DETECTED
SARS-COV-2 PCR, COVPCR: NOT DETECTED
SODIUM SERPL-SCNC: 135 MMOL/L (ref 136–145)
SP GR UR REFRACTOMETRY: 1.02 (ref 1–1.03)
TROPONIN I SERPL-MCNC: <0.05 NG/ML
UA: UC IF INDICATED,UAUC: ABNORMAL
UROBILINOGEN UR QL STRIP.AUTO: 1 EU/DL (ref 0.2–1)
WBC # BLD AUTO: 9.1 K/UL (ref 3.6–11)
WBC URNS QL MICRO: ABNORMAL /HPF (ref 0–4)

## 2021-07-10 PROCEDURE — 74011250636 HC RX REV CODE- 250/636: Performed by: EMERGENCY MEDICINE

## 2021-07-10 PROCEDURE — 96375 TX/PRO/DX INJ NEW DRUG ADDON: CPT

## 2021-07-10 PROCEDURE — 93005 ELECTROCARDIOGRAM TRACING: CPT

## 2021-07-10 PROCEDURE — 71045 X-RAY EXAM CHEST 1 VIEW: CPT

## 2021-07-10 PROCEDURE — 0202U NFCT DS 22 TRGT SARS-COV-2: CPT

## 2021-07-10 PROCEDURE — 74011000636 HC RX REV CODE- 636: Performed by: INTERNAL MEDICINE

## 2021-07-10 PROCEDURE — 77010033678 HC OXYGEN DAILY

## 2021-07-10 PROCEDURE — 80053 COMPREHEN METABOLIC PANEL: CPT

## 2021-07-10 PROCEDURE — 99285 EMERGENCY DEPT VISIT HI MDM: CPT

## 2021-07-10 PROCEDURE — 85025 COMPLETE CBC W/AUTO DIFF WBC: CPT

## 2021-07-10 PROCEDURE — 83605 ASSAY OF LACTIC ACID: CPT

## 2021-07-10 PROCEDURE — 96365 THER/PROPH/DIAG IV INF INIT: CPT

## 2021-07-10 PROCEDURE — 71275 CT ANGIOGRAPHY CHEST: CPT

## 2021-07-10 PROCEDURE — 84484 ASSAY OF TROPONIN QUANT: CPT

## 2021-07-10 PROCEDURE — 74011250637 HC RX REV CODE- 250/637: Performed by: INTERNAL MEDICINE

## 2021-07-10 PROCEDURE — 87040 BLOOD CULTURE FOR BACTERIA: CPT

## 2021-07-10 PROCEDURE — 36415 COLL VENOUS BLD VENIPUNCTURE: CPT

## 2021-07-10 PROCEDURE — 83880 ASSAY OF NATRIURETIC PEPTIDE: CPT

## 2021-07-10 PROCEDURE — 65660000000 HC RM CCU STEPDOWN

## 2021-07-10 PROCEDURE — 81001 URINALYSIS AUTO W/SCOPE: CPT

## 2021-07-10 RX ORDER — LANOLIN ALCOHOL/MO/W.PET/CERES
65 CREAM (GRAM) TOPICAL 2 TIMES DAILY
Status: DISCONTINUED | OUTPATIENT
Start: 2021-07-10 | End: 2021-07-12 | Stop reason: HOSPADM

## 2021-07-10 RX ORDER — DILTIAZEM HYDROCHLORIDE 240 MG/1
240 CAPSULE, COATED, EXTENDED RELEASE ORAL DAILY
Status: DISCONTINUED | OUTPATIENT
Start: 2021-07-11 | End: 2021-07-12 | Stop reason: HOSPADM

## 2021-07-10 RX ORDER — ACETAMINOPHEN 325 MG/1
650 TABLET ORAL
Status: DISCONTINUED | OUTPATIENT
Start: 2021-07-10 | End: 2021-07-12 | Stop reason: HOSPADM

## 2021-07-10 RX ORDER — CITALOPRAM 20 MG/1
40 TABLET, FILM COATED ORAL DAILY
Status: DISCONTINUED | OUTPATIENT
Start: 2021-07-11 | End: 2021-07-12 | Stop reason: HOSPADM

## 2021-07-10 RX ORDER — METOPROLOL SUCCINATE 50 MG/1
50 TABLET, EXTENDED RELEASE ORAL DAILY
Status: DISCONTINUED | OUTPATIENT
Start: 2021-07-11 | End: 2021-07-10

## 2021-07-10 RX ORDER — SODIUM CHLORIDE 0.9 % (FLUSH) 0.9 %
5-40 SYRINGE (ML) INJECTION EVERY 8 HOURS
Status: DISCONTINUED | OUTPATIENT
Start: 2021-07-10 | End: 2021-07-12 | Stop reason: HOSPADM

## 2021-07-10 RX ORDER — FENTANYL CITRATE 50 UG/ML
50 INJECTION, SOLUTION INTRAMUSCULAR; INTRAVENOUS
Status: COMPLETED | OUTPATIENT
Start: 2021-07-10 | End: 2021-07-10

## 2021-07-10 RX ORDER — ATORVASTATIN CALCIUM 20 MG/1
20 TABLET, FILM COATED ORAL
Status: DISCONTINUED | OUTPATIENT
Start: 2021-07-10 | End: 2021-07-12 | Stop reason: HOSPADM

## 2021-07-10 RX ORDER — SODIUM CHLORIDE 0.9 % (FLUSH) 0.9 %
5-40 SYRINGE (ML) INJECTION AS NEEDED
Status: DISCONTINUED | OUTPATIENT
Start: 2021-07-10 | End: 2021-07-12 | Stop reason: HOSPADM

## 2021-07-10 RX ORDER — METOPROLOL SUCCINATE 25 MG/1
25 TABLET, EXTENDED RELEASE ORAL DAILY
Status: DISCONTINUED | OUTPATIENT
Start: 2021-07-11 | End: 2021-07-12 | Stop reason: HOSPADM

## 2021-07-10 RX ORDER — LEVOFLOXACIN 5 MG/ML
750 INJECTION, SOLUTION INTRAVENOUS
Status: COMPLETED | OUTPATIENT
Start: 2021-07-10 | End: 2021-07-10

## 2021-07-10 RX ORDER — ENOXAPARIN SODIUM 100 MG/ML
40 INJECTION SUBCUTANEOUS EVERY 24 HOURS
Status: DISCONTINUED | OUTPATIENT
Start: 2021-07-10 | End: 2021-07-10

## 2021-07-10 RX ORDER — ONDANSETRON 2 MG/ML
4 INJECTION INTRAMUSCULAR; INTRAVENOUS
Status: COMPLETED | OUTPATIENT
Start: 2021-07-10 | End: 2021-07-10

## 2021-07-10 RX ORDER — HYDRALAZINE HYDROCHLORIDE 20 MG/ML
10 INJECTION INTRAMUSCULAR; INTRAVENOUS
Status: DISCONTINUED | OUTPATIENT
Start: 2021-07-10 | End: 2021-07-12 | Stop reason: HOSPADM

## 2021-07-10 RX ORDER — VANCOMYCIN/0.9 % SOD CHLORIDE 1.5G/250ML
1500 PLASTIC BAG, INJECTION (ML) INTRAVENOUS
Status: COMPLETED | OUTPATIENT
Start: 2021-07-10 | End: 2021-07-11

## 2021-07-10 RX ORDER — PHENYTOIN SODIUM 100 MG/1
200 CAPSULE, EXTENDED RELEASE ORAL 2 TIMES DAILY
Status: DISCONTINUED | OUTPATIENT
Start: 2021-07-10 | End: 2021-07-12 | Stop reason: HOSPADM

## 2021-07-10 RX ORDER — ASPIRIN 81 MG/1
81 TABLET ORAL 2 TIMES DAILY
Status: DISCONTINUED | OUTPATIENT
Start: 2021-07-10 | End: 2021-07-12 | Stop reason: HOSPADM

## 2021-07-10 RX ORDER — LEVOFLOXACIN 5 MG/ML
750 INJECTION, SOLUTION INTRAVENOUS EVERY 24 HOURS
Status: DISCONTINUED | OUTPATIENT
Start: 2021-07-11 | End: 2021-07-12

## 2021-07-10 RX ADMIN — FERROUS SULFATE TAB 325 MG (65 MG ELEMENTAL FE) 325 MG: 325 (65 FE) TAB at 17:53

## 2021-07-10 RX ADMIN — FENTANYL CITRATE 50 MCG: 50 INJECTION, SOLUTION INTRAMUSCULAR; INTRAVENOUS at 13:40

## 2021-07-10 RX ADMIN — ASPIRIN 81 MG: 81 TABLET, COATED ORAL at 17:53

## 2021-07-10 RX ADMIN — IOPAMIDOL 80 ML: 755 INJECTION, SOLUTION INTRAVENOUS at 21:01

## 2021-07-10 RX ADMIN — VANCOMYCIN HYDROCHLORIDE 1500 MG: 10 INJECTION, POWDER, LYOPHILIZED, FOR SOLUTION INTRAVENOUS at 13:44

## 2021-07-10 RX ADMIN — Medication 10 ML: at 22:17

## 2021-07-10 RX ADMIN — Medication 10 ML: at 17:54

## 2021-07-10 RX ADMIN — ONDANSETRON 4 MG: 2 INJECTION INTRAMUSCULAR; INTRAVENOUS at 13:40

## 2021-07-10 RX ADMIN — PHENYTOIN SODIUM 200 MG: 100 CAPSULE ORAL at 17:53

## 2021-07-10 RX ADMIN — LEVOFLOXACIN 750 MG: 5 INJECTION, SOLUTION INTRAVENOUS at 11:54

## 2021-07-10 RX ADMIN — ATORVASTATIN CALCIUM 20 MG: 20 TABLET, FILM COATED ORAL at 22:17

## 2021-07-10 NOTE — PROGRESS NOTES
Pharmacy Antimicrobial Kinetic Dosing    Indication for Antimicrobials: CAp     Current Regimen of Each Antimicrobial:  Vancomycin consult - started 7/10 day 1  Levofloxacin 750mg IV q24h - started 7/10 day 1    Previous Antimicrobial Therapy:  Nitrofurantoin OP     Abx Allergies:  PCN = swelling    Vancomycin Trough Goal Level:  15 - 20     Date Dose & Interval Measured (mcg/mL) Extrapolated (mcg/mL)    01:30                    Significant Positive Cultures:   7/10 Bcx - pending  7/10 R panel w/ COVID - pending    Conditions for Dosing Consideration: None    Labs:  Recent Labs     07/10/21  1047   CREA 0.40*   BUN 8     Recent Labs     07/10/21  1047   WBC 9.1     Temp (24hrs), Av.8 °F (37.1 °C), Min:98 °F (36.7 °C), Max:100.1 °F (37.8 °C)    Creatinine Clearance (mL/min):   CrCl (Ideal Body Weight): 64.7   If actual weight < IBW: CrCl (Actual Body Weight) 94.1    Impression/Plan:   Vancomycin 1000mg (10.8mg/kg) IV q12h for a projected trough at Css of 15.6  Continue Lq  BMP and CBC for  already ordered   Plan to order Vancomycin trough level before  2am dose  Antimicrobial stop date - pending     Pharmacy will follow daily and adjust medications as appropriate for renal function and/or serum levels.     Thank you,  Nurys Whyte, [facility]

## 2021-07-10 NOTE — PROGRESS NOTES
Spiritual Care Assessment/Progress Note  [facility]      NAME: Naz Haq      MRN: [id number]  AGE: [de-identified] y.o. SEX: female  Sabianism Affiliation: Synagogue   Language: English     7/10/2021     Total Time (in minutes): 25     Spiritual Assessment begun in [facility] EMERGENCY DEPT through conversation with:         [x]Patient        [] Family    [] Friend(s)        Reason for Consult:  Other (comment) (In Basket)     Spiritual beliefs: (Please include comment if needed)     [x] Identifies with a donell tradition: Synagogue         [] Supported by a donell community:            [] Claims no spiritual orientation:           [] Seeking spiritual identity:                [] Adheres to an individual form of spirituality:           [] Not able to assess:                           Identified resources for coping:      [x] Prayer                               [] Music                  [] Guided Imagery     [] Family/friends                 [] Pet visits     [] Devotional reading                         [] Unknown     [] Other:                                               Interventions offered during this visit: (See comments for more details)    Patient Interventions: Affirmation of emotions/emotional suffering, Prayer (actual), Prayer (assurance of), Life review/legacy, Initial visit, Initial/Spiritual assessment, patient floor, Catharsis/review of pertinent events in supportive environment           Plan of Care:     [] Support spiritual and/or cultural needs    [] Support AMD and/or advance care planning process      [] Support grieving process   [] Coordinate Rites and/or Rituals    [] Coordination with community clergy   [] No spiritual needs identified at this time   [] Detailed Plan of Care below (See Comments)  [] Make referral to Music Therapy  [] Make referral to Pet Therapy     [] Make referral to Addiction services  [] Make referral to [facility]  [] Make referral to Spiritual Care Partner  [] No future visits requested        [x] Follow up upon further referrals     Comments:      responded to the [facility] request for Ms. Kalee Price in [place]. At the first visit, patient was in medical intervention, at the second visit, pt was available to be visited. Patient was alert, no family was present.  explored her concerns and provided attentive listening. Pt reviewed her donell as Hinduism and expressed her frustration that she could not attend churches as the circumstances got worse (her mobility and COVId) and her will to recover from current admission.  encouraged her to keep her positive attitude, ended the visit with prayer by her request. She was thankful. Advised of  availability.      [address] Al Wilson., M.S., Th.M.  Spiritual Care Provider   Paging Service [phone]

## 2021-07-10 NOTE — ED NOTES
Patient is being transferred to [address], Room # [phone] (Telemetry pt; on PCU due to no other tele beds available). Report given to Kenyon Villalobos RN on Tyrone Woo for routine progression of care. Report consisted of the following information SBAR, ED Summary, Intake/Output, MAR and Recent Results. Patient transferred to receiving unit by: transport (RN or tech name). Outstanding consults needed: No     Next labs due: No     The following personal items will be sent with the patient during transfer to the floor: All valuables:    Cardiac monitoring ordered: Yes    The following CURRENT information was reported to the receiving RN:    Code status: Full Code at time of transfer    Last set of vital signs:  Vital Signs  Level of Consciousness: Alert (0) (07/10/21 1017)  Temp: 100.1 °F (37.8 °C) (07/10/21 1017)  Temp Source: Rectal (07/10/21 1017)  Pulse (Heart Rate): 61 (07/10/21 1430)  Resp Rate: 18 (07/10/21 1430)  BP: (!) 159/53 (07/10/21 1430)  MAP (Monitor): 79 (07/10/21 1430)  MAP (Calculated): 88 (07/10/21 1430)  MEWS Score: 3 (07/10/21 1017)         Oxygen Therapy  O2 Sat (%): 99 % (07/10/21 1430)  Pulse via Oximetry: 58 beats per minute (07/10/21 1430)  O2 Device: Nasal cannula (07/10/21 0946)  O2 Flow Rate (L/min): 4 l/min (07/10/21 0946)      Last pain assessment:  Pain 1  Pain Scale 1: Numeric (0 - 10)      Wounds: No     Urinary catheter: incontinent  Is there a hopkins order: No     LDAs:       Peripheral IV 07/10/21 Left Antecubital (Active)   Site Assessment Clean, dry, & intact 07/10/21 1031   Phlebitis Assessment 0 07/10/21 1031   Infiltration Assessment 4 07/10/21 1031   Dressing Type Transparent 07/10/21 1031   Hub Color/Line Status Green 07/10/21 1031         Opportunity for questions and clarification was provided.     Wolf Britton RN

## 2021-07-10 NOTE — H&P
Hospitalist Admission Note    NAME: Sandra Duncan   :  1940   MRN:  [id number]     Date/Time:  7/10/2021 3:48 PM    Patient PCP: Nellie Gilbert MD  ______________________________________________________________________  Given the patient's current clinical presentation, I have a high level of concern for decompensation if discharged from the emergency department. Complex decision making was performed, which includes reviewing the patient's available past medical records, laboratory results, and x-ray films. My assessment of this patient's clinical condition and my plan of care is as follows. Assessment / Plan:  Acute respiratory failure with hypoxia most likely secondary to HCAP  We will admit to telemetry, continue with 4 L of oxygen to keep saturation above 92%  Continue with IV antibiotics vancomycin and Levaquin , patient is allergic to cephalosporin   Follow-up blood cultures, respiratory panel with Covid PCR  Keep droplet plus. Patient reported having both for Covid vaccine shots  We will rule out PE get a CTA of the chest as patient had a recent hip fracture    Recent left femoral neck fracture this post left hip hemiarthroplasty  Continue with aspirin 81mg twice daily    Hypertension, uncontrolled  Sinus bradycardia  Chronic A. fib  Continue with home BP meds Cardizem and metoprolol with holding parameters  Prn  Hydralazine  History of seizures disorder  Continue with Dilantin, check Dilantin level    Code Status: Full code  Surrogate Decision Maker:    DVT Prophylaxis: Aspirin  GI Prophylaxis: not indicated    Baseline: Ambulate with a cane      Subjective:   CHIEF COMPLAINT: Shortness of breath breath and cough    HISTORY OF PRESENT ILLNESS:     Sandra Duncan is a [de-identified] y.o.  female past medical history of recent hip fracture, hypertension, A. fib, history of seizures who presents with shortness of breath and productive cough.   patient is a poor historian  In the ED, patient was found to have low-grade temperature, was hypertensive and bradycardic,  also hypoxic requiring 4 L of oxygen  Her labs revealed normal CBC, mild hyponatremia, mildly elevated proBNP  X-ray showed Bibasilar airspace opacities may reflect atelectasis or pneumonia  with possible pleural effusions. We were asked to admit for work up and evaluation of the above problems. Past Medical History:   Diagnosis Date    Arthritis     Bladder disorder     Cancer [facility])     breast cancer    Gastrointestinal disorder     acid reflux    GERD (gastroesophageal reflux disease)     High cholesterol     Hypertension     Other ill-defined conditions(799.89)     pneumonia    Psychiatric disorder     depression    Seizures (HCC)     Stroke ([facility] [address]     Thromboembolus ([facility] [address]         Past Surgical History:   Procedure Laterality Date    HX CATARACT REMOVAL      bilateral    HX  SECTION      x 3    HX CHOLECYSTECTOMY      HX ORTHOPAEDIC      left arm, left leg, right wrist    HX ORTHOPAEDIC  13    ELBOW OPEN REDUCTION INTERNAL FIXATION (Right    HX OTHER SURGICAL      Brain surgery s/p CVA    HX OTHER SURGICAL      Bladder stimulator 12    HX OTHER SURGICAL      gastric bypass    CA BREAST SURGERY PROCEDURE UNLISTED      leftt breast lumpectomy       Social History     Tobacco Use    Smoking status: Never Smoker    Smokeless tobacco: Never Used   Substance Use Topics    Alcohol use: No        Family History   Problem Relation Age of Onset    Stroke Father      Allergies   Allergen Reactions    Codeine Rash     Doesn't do well with morphine and its derivatives, tolerates tramadol    Penicillins Swelling        Prior to Admission medications    Medication Sig Start Date End Date Taking? Authorizing Provider   aspirin delayed-release 81 mg tablet Take 1 Tablet by mouth two (2) times a day for 30 days.  21  Sarah Teresa MD   nitrofurantoin, macrocrystal-monohydrate, (MACROBID) 100 mg capsule Take 1 Capsule by mouth two (2) times a day for 2 days. 7/7/21 7/9/21  Angelita Mullen MD   acetaminophen (TylenoL) 325 mg tablet Take 2 Tablets by mouth every four (4) hours as needed for Pain for up to 15 days. 7/7/21 7/22/21  Angelita Mullen MD   metoprolol succinate (TOPROL-XL) 50 mg XL tablet Take 50 mg by mouth daily. 9/1/14   Lars Rosen MD   phenytoin ER (DILANTIN ER) 100 mg ER capsule Take 200 mg by mouth two (2) times a day. 9/1/14   Lars Rosen MD   citalopram (CELEXA) 40 mg tablet Take 40 mg by mouth daily. Provider, Carol   diltiazem CD (CARTIA XT) 240 mg ER capsule Take 240 mg by mouth daily. Lars Rosen MD   ferrous sulfate (IRON) 325 mg (65 mg Iron) tablet Take 65 mg by mouth two (2) times a day. 9/27/10   Lars Rosen MD   simvastatin (ZOCOR) 40 mg tablet Take 40 mg by mouth nightly. Lars Rosen MD       REVIEW OF SYSTEMS:     I am not able to complete the review of systems because:    The patient is intubated and sedated    The patient has altered mental status due to his acute medical problems    The patient has baseline aphasia from prior stroke(s)    The patient has baseline dementia and is not reliable historian    The patient is in acute medical distress and unable to provide information           Total of 12 systems reviewed as follows:       POSITIVE= underlined text  Negative = text not underlined  General:  fever, chills, sweats, generalized weakness, weight loss/gain,      loss of appetite   Eyes:    blurred vision, eye pain, loss of vision, double vision  ENT:    rhinorrhea, pharyngitis   Respiratory:   cough, sputum production, SOB, STEIN, wheezing, pleuritic pain   Cardiology:   chest pain, palpitations, orthopnea, PND, edema, syncope   Gastrointestinal:  abdominal pain , N/V, diarrhea, dysphagia, constipation, bleeding   Genitourinary:  frequency, urgency, dysuria, hematuria, incontinence   Muskuloskeletal : arthralgia, myalgia, back pain  Hematology:  easy bruising, nose or gum bleeding, lymphadenopathy   Dermatological: rash, ulceration, pruritis, color change / jaundice  Endocrine:   hot flashes or polydipsia   Neurological:  headache, dizziness, confusion, focal weakness, paresthesia,     Speech difficulties, memory loss, gait difficulty  Psychological: Feelings of anxiety, depression, agitation    Objective:   VITALS:    Visit Vitals  BP (!) 140/53   Pulse 60   Temp 98 °F (36.7 °C)   Resp 12   Ht 5' 8\" (1.727 m)   Wt 93 kg (205 lb)   SpO2 100%   BMI 31.17 kg/m²       PHYSICAL EXAM:    General:    Alert, cooperative, no distress, appears stated age. HEENT: Atraumatic, anicteric sclerae, pink conjunctivae     No oral ulcers, mucosa moist, throat clear, dentition fair  Neck:  Supple, symmetrical,  thyroid: non tender  Lungs:   Decreased auscultation bilaterally. No Wheezing or Rhonchi. No rales. Chest wall:  No tenderness  No Accessory muscle use. Heart:   Regular  rhythm,  No  murmur   No edema  Abdomen:   Soft, non-tender. Not distended. Bowel sounds normal  Extremities: No cyanosis. No clubbing,      Skin turgor normal, Capillary refill normal, Radial dial pulse 2+  Skin:     Not pale. Not Jaundiced  No rashes   Psych:  Good insight. Not depressed. Not anxious or agitated. Neurologic: EOMs intact. No facial asymmetry. No aphasia or slurred speech. Symmetrical strength, Sensation grossly intact.  Alert and oriented X 4.     _______________________________________________________________________  Care Plan discussed with:    Comments   Patient x    Family      RN x    Care Manager                    Consultant:      _______________________________________________________________________  Expected  Disposition:   Home with Family    HH/PT/OT/RN    SNF/LTC    [facility]    ________________________________________________________________________  TOTAL TIME: 65Minutes    Critical Care Provided     Minutes non procedure based      Comments    x Reviewed previous records   >50% of visit spent in counseling and coordination of care x Discussion with patient and/or family and questions answered       ________________________________________________________________________  Signed: Paola Lehman MD    Procedures: see electronic medical records for all procedures/Xrays and details which were not copied into this note but were reviewed prior to creation of Plan. LAB DATA REVIEWED:    Recent Results (from the past 24 hour(s))   EKG, 12 LEAD, INITIAL    Collection Time: 07/10/21  9:49 AM   Result Value Ref Range    Ventricular Rate 58 BPM    Atrial Rate 220 BPM    QRS Duration 112 ms    Q-T Interval 494 ms    QTC Calculation (Bezet) 484 ms    Calculated P Axis 118 degrees    Calculated R Axis -30 degrees    Calculated T Axis 33 degrees    Diagnosis       Atrial flutter with variable AV block  Left axis deviation  Pulmonary disease pattern  Voltage criteria for left ventricular hypertrophy  Cannot rule out Septal infarct , age undetermined  When compared with ECG of 01-JUL-2021 14:20,  Atrial flutter has replaced Sinus rhythm  Minimal criteria for Septal infarct are now present     POC LACTIC ACID    Collection Time: 07/10/21 10:46 AM   Result Value Ref Range    Lactic Acid (POC) 0.86 0.40 - 2.00 mmol/L   CBC WITH AUTOMATED DIFF    Collection Time: 07/10/21 10:47 AM   Result Value Ref Range    WBC 9.1 3.6 - 11.0 K/uL    RBC 3.32 (L) 3.80 - 5.20 M/uL    HGB 10.6 (L) 11.5 - 16.0 g/dL    HCT 32.4 (L) 35.0 - 47.0 %    MCV 97.6 80.0 - 99.0 FL    MCH 31.9 26.0 - 34.0 PG    MCHC 32.7 30.0 - 36.5 g/dL    RDW 14.3 11.5 - 14.5 %    PLATELET 010 331 - 100 K/uL    MPV 9.1 8.9 - 12.9 FL    NRBC 0.0 0  WBC    ABSOLUTE NRBC 0.00 0.00 - 0.01 K/uL    NEUTROPHILS 81 (H) 32 - 75 %    LYMPHOCYTES 3 (L) 12 - 49 %    MONOCYTES 8 5 - 13 %    EOSINOPHILS 8 (H) 0 - 7 %    BASOPHILS 0 0 - 1 %    IMMATURE GRANULOCYTES 0 0.0 - 0.5 %    ABS. NEUTROPHILS 7.4 1.8 - 8.0 K/UL    ABS. LYMPHOCYTES 0.3 (L) 0.8 - 3.5 K/UL    ABS. MONOCYTES 0.7 0.0 - 1.0 K/UL    ABS. EOSINOPHILS 0.7 (H) 0.0 - 0.4 K/UL    ABS. BASOPHILS 0.0 0.0 - 0.1 K/UL    ABS. IMM. GRANS. 0.0 0.00 - 0.04 K/UL    DF MANUAL      RBC COMMENTS MACROCYTOSIS  1+        RBC COMMENTS BASOPHILIC STIPPLING  PRESENT       METABOLIC PANEL, COMPREHENSIVE    Collection Time: 07/10/21 10:47 AM   Result Value Ref Range    Sodium 135 (L) 136 - 145 mmol/L    Potassium 3.9 3.5 - 5.1 mmol/L    Chloride 95 (L) 97 - 108 mmol/L    CO2 38 (H) 21 - 32 mmol/L    Anion gap 2 (L) 5 - 15 mmol/L    Glucose 112 (H) 65 - 100 mg/dL    BUN 8 6 - 20 MG/DL    Creatinine 0.40 (L) 0.55 - 1.02 MG/DL    BUN/Creatinine ratio 20 12 - 20      GFR est AA >60 >60 ml/min/1.73m2    GFR est non-AA >60 >60 ml/min/1.73m2    Calcium 8.3 (L) 8.5 - 10.1 MG/DL    Bilirubin, total 0.5 0.2 - 1.0 MG/DL    ALT (SGPT) 19 12 - 78 U/L    AST (SGOT) 50 (H) 15 - 37 U/L    Alk.  phosphatase 110 45 - 117 U/L    Protein, total 5.4 (L) 6.4 - 8.2 g/dL    Albumin 2.3 (L) 3.5 - 5.0 g/dL    Globulin 3.1 2.0 - 4.0 g/dL    A-G Ratio 0.7 (L) 1.1 - 2.2     TROPONIN I    Collection Time: 07/10/21 10:47 AM   Result Value Ref Range    Troponin-I, Qt. <0.05 <0.05 ng/mL   NT-PRO BNP    Collection Time: 07/10/21 10:47 AM   Result Value Ref Range    NT pro- (H) <450 PG/ML   URINALYSIS W/ REFLEX CULTURE    Collection Time: 07/10/21  2:36 PM    Specimen: Urine   Result Value Ref Range    Color DARK YELLOW      Appearance CLEAR CLEAR      Specific gravity 1.017 1.003 - 1.030      pH (UA) 5.5 5.0 - 8.0      Protein Negative NEG mg/dL    Glucose Negative NEG mg/dL    Ketone Negative NEG mg/dL    Bilirubin Negative NEG      Blood Negative NEG      Urobilinogen 1.0 0.2 - 1.0 EU/dL    Nitrites Negative NEG      Leukocyte Esterase Negative NEG      WBC 0-4 0 - 4 /hpf    RBC 0-5 0 - 5 /hpf    Epithelial cells MODERATE (A) FEW /lpf    Bacteria Negative NEG /hpf    UA:UC IF INDICATED CULTURE NOT INDICATED BY UA RESULT CNI      Mucus TRACE (A) NEG /lpf

## 2021-07-11 ENCOUNTER — APPOINTMENT (OUTPATIENT)
Dept: NON INVASIVE DIAGNOSTICS | Age: 81
DRG: 205 | End: 2021-07-11
Attending: INTERNAL MEDICINE
Payer: MEDICARE

## 2021-07-11 LAB
ANION GAP SERPL CALC-SCNC: 2 MMOL/L (ref 5–15)
ATRIAL RATE: 220 BPM
BUN SERPL-MCNC: 6 MG/DL (ref 6–20)
BUN/CREAT SERPL: 17 (ref 12–20)
CALCIUM SERPL-MCNC: 8.8 MG/DL (ref 8.5–10.1)
CALCULATED P AXIS, ECG09: 118 DEGREES
CALCULATED R AXIS, ECG10: -30 DEGREES
CALCULATED T AXIS, ECG11: 33 DEGREES
CHLORIDE SERPL-SCNC: 93 MMOL/L (ref 97–108)
CO2 SERPL-SCNC: 39 MMOL/L (ref 21–32)
CREAT SERPL-MCNC: 0.36 MG/DL (ref 0.55–1.02)
DIAGNOSIS, 93000: NORMAL
ERYTHROCYTE [DISTWIDTH] IN BLOOD BY AUTOMATED COUNT: 14.4 % (ref 11.5–14.5)
GLUCOSE SERPL-MCNC: 93 MG/DL (ref 65–100)
HCT VFR BLD AUTO: 34.5 % (ref 35–47)
HGB BLD-MCNC: 10.8 G/DL (ref 11.5–16)
MCH RBC QN AUTO: 31.3 PG (ref 26–34)
MCHC RBC AUTO-ENTMCNC: 31.3 G/DL (ref 30–36.5)
MCV RBC AUTO: 100 FL (ref 80–99)
NRBC # BLD: 0 K/UL (ref 0–0.01)
NRBC BLD-RTO: 0 PER 100 WBC
PHENYTOIN SERPL-MCNC: 27 UG/ML (ref 10–20)
PLATELET # BLD AUTO: 336 K/UL (ref 150–400)
PMV BLD AUTO: 9.1 FL (ref 8.9–12.9)
POTASSIUM SERPL-SCNC: 3.9 MMOL/L (ref 3.5–5.1)
PROCALCITONIN SERPL-MCNC: <0.05 NG/ML
Q-T INTERVAL, ECG07: 494 MS
QRS DURATION, ECG06: 112 MS
QTC CALCULATION (BEZET), ECG08: 484 MS
RBC # BLD AUTO: 3.45 M/UL (ref 3.8–5.2)
SODIUM SERPL-SCNC: 134 MMOL/L (ref 136–145)
VENTRICULAR RATE, ECG03: 58 BPM
WBC # BLD AUTO: 8.1 K/UL (ref 3.6–11)

## 2021-07-11 PROCEDURE — 74011250637 HC RX REV CODE- 250/637: Performed by: INTERNAL MEDICINE

## 2021-07-11 PROCEDURE — 80048 BASIC METABOLIC PNL TOTAL CA: CPT

## 2021-07-11 PROCEDURE — 80185 ASSAY OF PHENYTOIN TOTAL: CPT

## 2021-07-11 PROCEDURE — 84145 PROCALCITONIN (PCT): CPT

## 2021-07-11 PROCEDURE — 36415 COLL VENOUS BLD VENIPUNCTURE: CPT

## 2021-07-11 PROCEDURE — 65660000000 HC RM CCU STEPDOWN

## 2021-07-11 PROCEDURE — 85027 COMPLETE CBC AUTOMATED: CPT

## 2021-07-11 PROCEDURE — 77010033678 HC OXYGEN DAILY

## 2021-07-11 PROCEDURE — 74011250636 HC RX REV CODE- 250/636: Performed by: INTERNAL MEDICINE

## 2021-07-11 RX ORDER — BALSAM PERU/CASTOR OIL
OINTMENT (GRAM) TOPICAL 2 TIMES DAILY
Status: DISCONTINUED | OUTPATIENT
Start: 2021-07-11 | End: 2021-07-12 | Stop reason: HOSPADM

## 2021-07-11 RX ADMIN — ASPIRIN 81 MG: 81 TABLET, COATED ORAL at 17:44

## 2021-07-11 RX ADMIN — FERROUS SULFATE TAB 325 MG (65 MG ELEMENTAL FE) 325 MG: 325 (65 FE) TAB at 09:45

## 2021-07-11 RX ADMIN — VANCOMYCIN HYDROCHLORIDE 1000 MG: 1 INJECTION, POWDER, LYOPHILIZED, FOR SOLUTION INTRAVENOUS at 13:54

## 2021-07-11 RX ADMIN — CASTOR OIL AND BALSAM, PERU: 788; 87 OINTMENT TOPICAL at 17:44

## 2021-07-11 RX ADMIN — METOPROLOL SUCCINATE 25 MG: 25 TABLET, EXTENDED RELEASE ORAL at 09:45

## 2021-07-11 RX ADMIN — DILTIAZEM HYDROCHLORIDE 240 MG: 240 CAPSULE, COATED, EXTENDED RELEASE ORAL at 09:45

## 2021-07-11 RX ADMIN — FERROUS SULFATE TAB 325 MG (65 MG ELEMENTAL FE) 325 MG: 325 (65 FE) TAB at 17:44

## 2021-07-11 RX ADMIN — ATORVASTATIN CALCIUM 20 MG: 20 TABLET, FILM COATED ORAL at 21:11

## 2021-07-11 RX ADMIN — Medication 10 ML: at 13:56

## 2021-07-11 RX ADMIN — LEVOFLOXACIN 750 MG: 5 INJECTION, SOLUTION INTRAVENOUS at 11:40

## 2021-07-11 RX ADMIN — ASPIRIN 81 MG: 81 TABLET, COATED ORAL at 09:45

## 2021-07-11 RX ADMIN — Medication 10 ML: at 06:49

## 2021-07-11 RX ADMIN — CITALOPRAM HYDROBROMIDE 40 MG: 20 TABLET ORAL at 09:45

## 2021-07-11 RX ADMIN — Medication 10 ML: at 21:12

## 2021-07-11 RX ADMIN — VANCOMYCIN HYDROCHLORIDE 1000 MG: 1 INJECTION, POWDER, LYOPHILIZED, FOR SOLUTION INTRAVENOUS at 04:26

## 2021-07-11 RX ADMIN — CASTOR OIL AND BALSAM, PERU: 788; 87 OINTMENT TOPICAL at 13:54

## 2021-07-11 NOTE — PROGRESS NOTES
Hospitalist Progress Note    NAME: Ana Persaud   :  1940   MRN:  [id number]       Assessment / Plan:  Acute respiratory failure with hypoxia most likely secondary to HCAP versus atelectasis  on admission she required 4 L of oxygen  currently at 1 L  empirically started on vancomycin and Levaquin  patient does not have white count or fever  suspect x-ray findings are due to atelectasis  continue antibiotics for now, obtain procalcitonin levels  procalcitonin levels are low I will discontinue antibiotics  added incentive spirometry  Follow-up blood cultures,   COVID-19 testing negative  CTA of the chest findings as below    1. Enlarged pulmonary arteries compatible pulmonary artery hypertension with no  acute pulmonary emboli. 2. Dilated cardiomegaly with coronary artery disease. 3. Small bilateral pleural effusions with overlying atelectasis. 4. Suspected cirrhosis and additional incidental findings as above.     Recent left femoral neck fracture this post left hip hemiarthroplasty  Continue with aspirin 81mg twice daily     Hypertension, uncontrolled  Sinus bradycardia  Chronic A. fib  Continue with home BP meds Cardizem and metoprolol with holding parameters  Prn  Hydralazine  History of seizures disorder  Continue to hold Dilantin, Dilantin levels are elevated,  Dilantin levels tomorrow     Code Status: Full code  Surrogate Decision Maker:     DVT Prophylaxis: Aspirin  GI Prophylaxis: not indicated     Baseline: Ambulate with a cane           Subjective:     Chief Complaint / Reason for Physician Visit  \"Shortness of breath is improved, denies any cough or sputum production\". Discussed with RN events overnight.      Review of Systems:  Symptom Y/N Comments  Symptom Y/N Comments   Fever/Chills    Chest Pain     Poor Appetite    Edema     Cough    Abdominal Pain     Sputum    Joint Pain     SOB/STEIN    Pruritis/Rash     Nausea/vomit    Tolerating PT/OT     Diarrhea    Tolerating Diet     Constipation Other       Could NOT obtain due to:      Objective:     VITALS:   Last 24hrs VS reviewed since prior progress note. Most recent are:  Patient Vitals for the past 24 hrs:   Temp Pulse Resp BP SpO2   07/11/21 1140    (!) 144/50    07/11/21 1135  75 16 (!) 161/71 98 %   07/11/21 1115 97.8 °F (36.6 °C) 74 18 (!) 180/68 97 %   07/11/21 0830 97.9 °F (36.6 °C) 82 19 138/67 97 %   07/11/21 0432 97.8 °F (36.6 °C) 73 17 139/60 92 %   07/10/21 2335 97.8 °F (36.6 °C) 66 17 (!) 121/48 91 %   07/10/21 2007 97.9 °F (36.6 °C) (!) 59 13 (!) 135/40 97 %   07/10/21 1805     95 %   07/10/21 1531 98 °F (36.7 °C) 60 12 (!) 140/53 100 %   07/10/21 1430  61 18 (!) 159/53 99 %   07/10/21 1415  (!) 58 13 (!) 153/68 97 %   07/10/21 1400  60 14 (!) 161/59 95 %   07/10/21 1345  (!) 54 11 (!) 149/51 97 %   07/10/21 1330  (!) 54 21 (!) 143/61 99 %   07/10/21 1315  (!) 55 13 (!) 146/52 99 %   07/10/21 1300  (!) 56 21 125/69 96 %   07/10/21 1245  61 19 (!) 156/62 97 %   07/10/21 1230  60 16 (!) 162/63 99 %   07/10/21 1215  60 18 (!) 153/67 99 %       Intake/Output Summary (Last 24 hours) at 7/11/2021 1207  Last data filed at 7/11/2021 0945  Gross per 24 hour   Intake 730 ml   Output 0 ml   Net 730 ml        PHYSICAL EXAM:  General: WD, WN. Alert, cooperative, no acute distress    EENT:  EOMI. Anicteric sclerae. MMM  Resp:  CTA bilaterally, no wheezing or rales. No accessory muscle use  CV:  Regular  rhythm,  No edema  GI:  Soft, Non distended, Non tender.  +Bowel sounds  Neurologic:  Alert and oriented X 3, normal speech,   Psych:   Good insight. Not anxious nor agitated  Skin:  No rashes.   No jaundice    Reviewed most current lab test results and cultures  YES  Reviewed most current radiology test results   YES  Review and summation of old records today    NO  Reviewed patient's current orders and MAR    YES  PMH/SH reviewed - no change compared to H&P  ________________________________________________________________________  Care Plan discussed with:    Comments   Patient x    Family  x  daughter present at bedside   RN x    Care Manager     Consultant                        Multidiciplinary team rounds were held today with , nursing, pharmacist and clinical coordinator. Patient's plan of care was discussed; medications were reviewed and discharge planning was addressed. ________________________________________________________________________  Total NON critical care TIME:  30  Minutes    Total CRITICAL CARE TIME Spent:   Minutes non procedure based      Comments   >50% of visit spent in counseling and coordination of care     ________________________________________________________________________  Magnus Schilder, MD     Procedures: see electronic medical records for all procedures/Xrays and details which were not copied into this note but were reviewed prior to creation of Plan. LABS:  I reviewed today's most current labs and imaging studies.   Pertinent labs include:  Recent Labs     07/11/21 0438 07/10/21  1047   WBC 8.1 9.1   HGB 10.8* 10.6*   HCT 34.5* 32.4*    335     Recent Labs     07/11/21  0438 07/10/21  1047   * 135*   K 3.9 3.9   CL 93* 95*   CO2 39* 38*   GLU 93 112*   BUN 6 8   CREA 0.36* 0.40*   CA 8.8 8.3*   ALB  --  2.3*   TBILI  --  0.5   ALT  --  19       Signed: Magnus Schilder, MD

## 2021-07-11 NOTE — PROGRESS NOTES
Received notification from bedside RN about patient with regards to: elevated Phenytoin level: 27  VS: /60, HR 73, RR 17, O2 sat 92% on NC 1 L    Intervention given: Held scheduled Phenytoin BID

## 2021-07-11 NOTE — ED PROVIDER NOTES
EMERGENCY DEPARTMENT HISTORY AND PHYSICAL EXAM      Date: 7/10/2021  Patient Name: Yanna Britt    History of Presenting Illness     CC: SOA    History Provided By: Patient and Patient's Son    HPI: Yanna Britt, [de-identified] y.o. female presents to the ED with cc of SOA. Pt with recent adm to hospital for hip fx. She had been dc for rehab purposes. Son stated he was called today as it was noted that the pt seemed more confused today with increase work of breathing. She normally has a chronic cough, son is unaware if this is worse than her normal. He states she does appear more fatigued and she is altered. He had been alerted to her having a fever this morning and was being sent to the ED. Pt arrives awake but is confused. She is c/o of chest pain but unable to qualify or quantify her pain. Son states her cough does seem to be worse than her normal cough. There had been no reports of vomiting or diarrhea. Pt denies any pain in hip. There are no other complaints, changes, or physical findings at this time. PCP: Marissa Becker MD    No current facility-administered medications on file prior to encounter. Current Outpatient Medications on File Prior to Encounter   Medication Sig Dispense Refill    aspirin delayed-release 81 mg tablet Take 1 Tablet by mouth two (2) times a day for 30 days. 60 Tablet 0    acetaminophen (TylenoL) 325 mg tablet Take 2 Tablets by mouth every four (4) hours as needed for Pain for up to 15 days. 30 Tablet 0    metoprolol succinate (TOPROL-XL) 50 mg XL tablet Take 50 mg by mouth daily.  phenytoin ER (DILANTIN ER) 100 mg ER capsule Take 200 mg by mouth two (2) times a day.  citalopram (CELEXA) 40 mg tablet Take 40 mg by mouth daily.  diltiazem CD (CARTIA XT) 240 mg ER capsule Take 240 mg by mouth daily.  ferrous sulfate (IRON) 325 mg (65 mg Iron) tablet Take 65 mg by mouth two (2) times a day.       simvastatin (ZOCOR) 40 mg tablet Take 40 mg by mouth nightly. Past History     Past Medical History:  Past Medical History:   Diagnosis Date    Arthritis     Bladder disorder     Cancer ([facility] [address]     breast cancer    Gastrointestinal disorder     acid reflux    GERD (gastroesophageal reflux disease)     High cholesterol     Hypertension     Other ill-defined conditions(799.89)     pneumonia    Psychiatric disorder     depression    Seizures (HCC)     Stroke ([facility] [address]     [facility]        Past Surgical History:  Past Surgical History:   Procedure Laterality Date    HX CATARACT REMOVAL      bilateral    HX  SECTION      x 3    HX CHOLECYSTECTOMY      HX ORTHOPAEDIC      left arm, left leg, right wrist    HX ORTHOPAEDIC  13    ELBOW OPEN REDUCTION INTERNAL FIXATION (Right    HX OTHER SURGICAL      Brain surgery s/p CVA    HX OTHER SURGICAL      Bladder stimulator 12    HX OTHER SURGICAL      gastric bypass    OH BREAST SURGERY PROCEDURE UNLISTED      leftt breast lumpectomy       Family History:  Family History   Problem Relation Age of Onset    Stroke Father        Social History:  Social History     Tobacco Use    Smoking status: Never Smoker    Smokeless tobacco: Never Used   Substance Use Topics    Alcohol use: No    Drug use: No       Allergies: Allergies   Allergen Reactions    Codeine Rash     Doesn't do well with morphine and its derivatives, tolerates tramadol    Penicillins Swelling         Review of Systems   Review of Systems   Unable to perform ROS: Mental status change       Physical Exam   Physical Exam  Vitals and nursing note reviewed. Constitutional:       General: She is not in acute distress. Appearance: She is well-developed. She is obese. She is ill-appearing. She is not diaphoretic. HENT:      Head: Normocephalic and atraumatic. Mouth/Throat:      Mouth: Mucous membranes are dry. Pharynx: No oropharyngeal exudate.    Eyes:      Extraocular Movements: Extraocular movements intact. Conjunctiva/sclera: Conjunctivae normal.      Pupils: Pupils are equal, round, and reactive to light. Neck:      Vascular: No JVD. Trachea: No tracheal deviation. Cardiovascular:      Rate and Rhythm: Bradycardia present. Rhythm irregular. Heart sounds: Normal heart sounds. No murmur heard. Pulmonary:      Effort: Pulmonary effort is normal. No respiratory distress. Breath sounds: No stridor. No wheezing. Comments: Diminished breath sounds in bases bilateral, coarse breath sounds upper lung karimi yogesh, no wheezing       Abdominal:      General: There is no distension. Palpations: Abdomen is soft. Tenderness: There is no abdominal tenderness. There is no guarding or rebound. Musculoskeletal:         General: No tenderness. Normal range of motion. Cervical back: Normal range of motion and neck supple. Right lower leg: Edema (trace) present. Left lower leg: Edema (trace) present. Skin:     General: Skin is warm and dry. Capillary Refill: Capillary refill takes 2 to 3 seconds. Neurological:      Mental Status: She is alert. She is disoriented. Cranial Nerves: No cranial nerve deficit.       Comments: No gross motor or sensory deficits    Psychiatric:      Comments: Flat affect, depressed mood            Diagnostic Study Results     Labs -     Recent Results (from the past 24 hour(s))   EKG, 12 LEAD, INITIAL    Collection Time: 07/10/21  9:49 AM   Result Value Ref Range    Ventricular Rate 58 BPM    Atrial Rate 220 BPM    QRS Duration 112 ms    Q-T Interval 494 ms    QTC Calculation (Bezet) 484 ms    Calculated P Axis 118 degrees    Calculated R Axis -30 degrees    Calculated T Axis 33 degrees    Diagnosis       Atrial flutter with variable AV block  Left axis deviation  Pulmonary disease pattern  Voltage criteria for left ventricular hypertrophy  Cannot rule out Septal infarct , age undetermined  When compared with ECG of 01-JUL-2021 14:20,  Atrial flutter has replaced Sinus rhythm  Minimal criteria for Septal infarct are now present     POC LACTIC ACID    Collection Time: 07/10/21 10:46 AM   Result Value Ref Range    Lactic Acid (POC) 0.86 0.40 - 2.00 mmol/L   CBC WITH AUTOMATED DIFF    Collection Time: 07/10/21 10:47 AM   Result Value Ref Range    WBC 9.1 3.6 - 11.0 K/uL    RBC 3.32 (L) 3.80 - 5.20 M/uL    HGB 10.6 (L) 11.5 - 16.0 g/dL    HCT 32.4 (L) 35.0 - 47.0 %    MCV 97.6 80.0 - 99.0 FL    MCH 31.9 26.0 - 34.0 PG    MCHC 32.7 30.0 - 36.5 g/dL    RDW 14.3 11.5 - 14.5 %    PLATELET 080 261 - 520 K/uL    MPV 9.1 8.9 - 12.9 FL    NRBC 0.0 0  WBC    ABSOLUTE NRBC 0.00 0.00 - 0.01 K/uL    NEUTROPHILS 81 (H) 32 - 75 %    LYMPHOCYTES 3 (L) 12 - 49 %    MONOCYTES 8 5 - 13 %    EOSINOPHILS 8 (H) 0 - 7 %    BASOPHILS 0 0 - 1 %    IMMATURE GRANULOCYTES 0 0.0 - 0.5 %    ABS. NEUTROPHILS 7.4 1.8 - 8.0 K/UL    ABS. LYMPHOCYTES 0.3 (L) 0.8 - 3.5 K/UL    ABS. MONOCYTES 0.7 0.0 - 1.0 K/UL    ABS. EOSINOPHILS 0.7 (H) 0.0 - 0.4 K/UL    ABS. BASOPHILS 0.0 0.0 - 0.1 K/UL    ABS. IMM. GRANS. 0.0 0.00 - 0.04 K/UL    DF MANUAL      RBC COMMENTS MACROCYTOSIS  1+        RBC COMMENTS BASOPHILIC STIPPLING  PRESENT       METABOLIC PANEL, COMPREHENSIVE    Collection Time: 07/10/21 10:47 AM   Result Value Ref Range    Sodium 135 (L) 136 - 145 mmol/L    Potassium 3.9 3.5 - 5.1 mmol/L    Chloride 95 (L) 97 - 108 mmol/L    CO2 38 (H) 21 - 32 mmol/L    Anion gap 2 (L) 5 - 15 mmol/L    Glucose 112 (H) 65 - 100 mg/dL    BUN 8 6 - 20 MG/DL    Creatinine 0.40 (L) 0.55 - 1.02 MG/DL    BUN/Creatinine ratio 20 12 - 20      GFR est AA >60 >60 ml/min/1.73m2    GFR est non-AA >60 >60 ml/min/1.73m2    Calcium 8.3 (L) 8.5 - 10.1 MG/DL    Bilirubin, total 0.5 0.2 - 1.0 MG/DL    ALT (SGPT) 19 12 - 78 U/L    AST (SGOT) 50 (H) 15 - 37 U/L    Alk.  phosphatase 110 45 - 117 U/L    Protein, total 5.4 (L) 6.4 - 8.2 g/dL    Albumin 2.3 (L) 3.5 - 5.0 g/dL    Globulin 3.1 2.0 - 4.0 g/dL A-G Ratio 0.7 (L) 1.1 - 2.2     TROPONIN I    Collection Time: 07/10/21 10:47 AM   Result Value Ref Range    Troponin-I, Qt. <0.05 <0.05 ng/mL   NT-PRO BNP    Collection Time: 07/10/21 10:47 AM   Result Value Ref Range    NT pro- (H) <450 PG/ML   RESPIRATORY VIRUS PANEL W/COVID-19, PCR    Collection Time: 07/10/21  2:01 PM    Specimen: Nasopharyngeal   Result Value Ref Range    Adenovirus Not detected NOTD      Coronavirus 229E Not detected NOTD      Coronavirus HKU1 Not detected NOTD      Coronavirus CVNL63 Not detected NOTD      Coronavirus OC43 Not detected NOTD      SARS-CoV-2, PCR Not detected NOTD      Metapneumovirus Not detected NOTD      Rhinovirus and Enterovirus Not detected NOTD      Influenza A Not detected NOTD      Influenza A, subtype H1 Not detected NOTD      Influenza A, subtype H3 Not detected NOTD      INFLUENZA A H1N1 PCR Not detected NOTD      Influenza B Not detected NOTD      Parainfluenza 1 Not detected NOTD      Parainfluenza 2 Not detected NOTD      Parainfluenza 3 Not detected NOTD      Parainfluenza virus 4 Not detected NOTD      RSV by PCR Not detected NOTD      B. parapertussis, PCR Not detected NOTD      Bordetella pertussis - PCR Not detected NOTD      Chlamydophila pneumoniae DNA, QL, PCR Not detected NOTD      Mycoplasma pneumoniae DNA, QL, PCR Not detected NOTD     URINALYSIS W/ REFLEX CULTURE    Collection Time: 07/10/21  2:36 PM    Specimen: Urine   Result Value Ref Range    Color DARK YELLOW      Appearance CLEAR CLEAR      Specific gravity 1.017 1.003 - 1.030      pH (UA) 5.5 5.0 - 8.0      Protein Negative NEG mg/dL    Glucose Negative NEG mg/dL    Ketone Negative NEG mg/dL    Bilirubin Negative NEG      Blood Negative NEG      Urobilinogen 1.0 0.2 - 1.0 EU/dL    Nitrites Negative NEG      Leukocyte Esterase Negative NEG      WBC 0-4 0 - 4 /hpf    RBC 0-5 0 - 5 /hpf    Epithelial cells MODERATE (A) FEW /lpf    Bacteria Negative NEG /hpf    UA:UC IF INDICATED CULTURE NOT INDICATED BY UA RESULT CNI      Mucus TRACE (A) NEG /lpf         Radiologic Studies -   CTA CHEST W OR W WO CONT   Final Result      1. Enlarged pulmonary arteries compatible pulmonary artery hypertension with no   acute pulmonary emboli. 2. Dilated cardiomegaly with coronary artery disease. 3. Small bilateral pleural effusions with overlying atelectasis. 4. Suspected cirrhosis and additional incidental findings as above. XR CHEST PORT   Final Result   Bibasilar airspace opacities may reflect atelectasis or pneumonia   with possible pleural effusions. CT Results  (Last 48 hours)               07/10/21 2102  CTA CHEST W OR W WO CONT Final result    Impression:      1. Enlarged pulmonary arteries compatible pulmonary artery hypertension with no   acute pulmonary emboli. 2. Dilated cardiomegaly with coronary artery disease. 3. Small bilateral pleural effusions with overlying atelectasis. 4. Suspected cirrhosis and additional incidental findings as above. Narrative:  EXAM: CTA CHEST W OR W WO CONT       INDICATION: r/o PE/recent hip fracture       COMPARISON: None. CONTRAST: 80 mL of Isovue-370. TECHNIQUE:    Precontrast  images were obtained to localize the volume for acquisition. Multislice helical CT arteriography was performed from the diaphragm to the   thoracic inlet during uneventful rapid bolus intravenous contrast   administration. Lung and soft tissue windows were generated. Coronal and   sagittal images were generated and 3D post processing consisting of coronal   maximum intensity images was performed. CT dose reduction was achieved through   use of a standardized protocol tailored for this examination and automatic   exposure control for dose modulation. FINDINGS:   The lungs show atelectasis posteriorly overlying dependently layering pleural   effusions. Aerated lungs are clear of mass, nodule, airspace disease or edema.    Total structures show degenerative spine change but otherwise unremarkable. There is enlargement of the main pulmonary artery and right and left pulmonary   arteries with no filling defect or local aneurysm. .       There is no mediastinal or hilar adenopathy or mass. The heart is enlarged with   four-chamber dilation. Coronary artery calcifications are noted. The aorta   contains atherosclerotic calcifications but otherwise enhances normally without   evidence of aneurysm or dissection. There is a moderate sliding hiatal hernia with surgical changes of prior gastric   bypass. The visualized portions of the upper abdominal organs shows suspected   cirrhotic liver morphology with nodular contour and a knuckle of small bowel   entering into a high anterior midline abdominal wall hernia which also contains   omental fat. .       Chest wall structures show chronic lateral right third, fourth, fifth, sixth,   and seventh rib fracture deformities and chronic healed appearing fracture   deformity of the sternum with rightward convex thoracic scoliosis and no acute   fracture or aggressive lesion as well as postsurgical changes of lumpectomy in   left breast.               CXR Results  (Last 48 hours)               07/10/21 1005  XR CHEST PORT Final result    Impression:  Bibasilar airspace opacities may reflect atelectasis or pneumonia   with possible pleural effusions. Narrative:  EXAM: XR CHEST PORT       INDICATION: Shortness of breath       COMPARISON: CT 7/1/2021. FINDINGS: A portable AP radiograph of the chest was obtained at 09:52 hours. The   patient is on a cardiac monitor. Bibasilar airspace opacity at the lung bases   obscuring the diaphragms. No pneumothorax. The cardiac and mediastinal contours   and pulmonary vascularity are normal.  Atherosclerotic calcifications affect the   aortic arch and the thoracic aorta is tortuous.  The chest wall structures and   visualized upper abdomen show no acute findings with incidental note of   degenerative spine and shoulder changes as well as diffuse osteopenia. Medical Decision Making   I am the first provider for this patient. I reviewed the vital signs, available nursing notes, past medical history, past surgical history, family history and social history. Vital Signs-Reviewed the patient's vital signs. Patient Vitals for the past 12 hrs:   Temp Pulse Resp BP SpO2   07/10/21 2335 97.8 °F (36.6 °C) 66 17 (!) 121/48 91 %   07/10/21 2007 97.9 °F (36.6 °C) (!) 59 13 (!) 135/40 97 %   07/10/21 1805     95 %   07/10/21 1531 98 °F (36.7 °C) 60 12 (!) 140/53 100 %   07/10/21 1430  61 18 (!) 159/53 99 %   07/10/21 1415  (!) 58 13 (!) 153/68 97 %   07/10/21 1400  60 14 (!) 161/59 95 %   07/10/21 1345  (!) 54 11 (!) 149/51 97 %   07/10/21 1330  (!) 54 21 (!) 143/61 99 %   07/10/21 1315  (!) 55 13 (!) 146/52 99 %   07/10/21 1300  (!) 56 21 125/69 96 %   07/10/21 1245  61 19 (!) 156/62 97 %   07/10/21 1230  60 16 (!) 162/63 99 %       EKG interpretation: (Preliminary)  Junctional bradycardic, rate 58, NSST changes, poor baseline, Bajwa Ohms, DO      Records Reviewed: Nursing Notes, Old Medical Records, Ambulance Run Sheet, Previous Radiology Studies and Previous Laboratory Studies, pt with recent admission for hip fx on 7/1 surgery on 7/2    Provider Notes (Medical Decision Making):   DDx- Pneumonia, bronchitis, UTI, Atelectasis    ED Course:   Initial assessment performed. The patients presenting problems have been discussed, and they are in agreement with the care plan formulated and outlined with them. I have encouraged them to ask questions as they arise throughout their visit. O2 sats 85% on room air. Pt requiring 4L O2 by NC secondary to hypoxia with no prior requirement of oxygen. She has low grade temp with productive cough and recent admission for hip surgery.  Atelectasis and effusion on Xray, given this with fever will Will start Ab to cover for HAP, no active wheezing. Pt did receive both COVID vaccines, will send Resp PCR panel. Consult:   Case discussed with Hospitalist. Pt will be evaluated and admitted. I have updated the son on dx and plan for admission. He did state that as her come closer to dc, he would like to entertain the idea of pt coming home and not back to SNF. Critical Care Time:   CRITICAL CARE NOTE :    IMPENDING DETERIORATION -Respiratory  ASSOCIATED RISK FACTORS - Hypoxia, AMS  MANAGEMENT- Bedside Assessment and Supervision of Care  INTERPRETATION -  Xrays, ECG, Blood Pressure, Cardiac Output Measures  and labs  INTERVENTIONS - Oxygen 4L by NC, IV Ab  CASE REVIEW - Hospitalist/Intensivist, Nursing and Family  TREATMENT RESPONSE -Stable  PERFORMED BY - Self    NOTES   :  I have spent 40 minutes of critical care time involved in lab review, consultations with specialist, family decision- making, bedside attention and documentation. This time excludes time spent in any separate billed procedures. During this entire length of time I was immediately available to the patient . Kelley May DO      Disposition:  Admit       Diagnosis     Clinical Impression:   1. Acute respiratory failure with hypoxia ([address]    2. Pneumonia of both lower lobes due to infectious organism    3. S/p ORIF hip fx     Attestations:    Kelley May DO    Please note that this dictation was completed with [employer], the computer voice recognition software. Quite often unanticipated grammatical, syntax, homophones, and other interpretive errors are inadvertently transcribed by the computer software. Please disregard these errors. Please excuse any errors that have escaped final proofreading. Thank you.

## 2021-07-11 NOTE — PROGRESS NOTES
1900: Verbal shift change report given to Jeannine Lee RN (oncoming nurse) by Reggie Lewis RN (offgoing nurse). Report included the following information SBAR, Kardex, Intake/Output, MAR, Recent Results, Med Rec Status and Cardiac Rhythm NSR.     2007: Patient off floor to CT.    0547: Patient's Phenytoin level came back critical at 27.0. Notified NP Erica Dodd. Future doses of phenytoin to be held for the time being. Orders placed to hold.    0700: End of Shift Note    Bedside shift change report given to Citlalli Esteves RN (oncoming nurse) by Sommer Morgan (offgoing nurse). Report included the following information SBAR, Kardex, Intake/Output, MAR, Recent Results, Med Rec Status and Cardiac Rhythm NSR    Shift worked:  7p-7a     Shift summary and any significant changes:     No significant changes. Patient's Phenytoin level came back critical at 27.0. Concerns for physician to address:  None     Zone phone for oncoming shift:   [phone]       Activity:  Activity Level: Bed Rest  Number times ambulated in hallways past shift: 0  Number of times OOB to chair past shift: 0    Cardiac:   Cardiac Monitoring: Yes      Cardiac Rhythm: Sinus Rhythm    Access:   Current line(s): PIV     Genitourinary:   Urinary status: voiding, incontinent and external catheter    Respiratory:   O2 Device: Nasal cannula  Chronic home O2 use?: NO  Incentive spirometer at bedside: YES     GI:  Last Bowel Movement Date:  (PTA)  Current diet:  ADULT DIET Regular; Low Fat/Low Chol/High Fiber/2 gm Na  Passing flatus: YES  Tolerating current diet: YES       Pain Management:   Patient states pain is manageable on current regimen: YES    Skin:  Greg Score: 14  Interventions: increase time out of bed and internal/external urinary devices    Patient Safety:  Fall Score:  Total Score: 4  Interventions: bed/chair alarm and pt to call before getting OOB  High Fall Risk: Yes    Length of Stay:  Expected LOS: - - -  Actual LOS: [address]

## 2021-07-11 NOTE — PROGRESS NOTES
Bedside and Verbal shift change report given to Mary (oncoming nurse) by Nora Can (offgoing nurse). Report included the following information SBAR, Kardex, Procedure Summary, Intake/Output, MAR and Recent Results. 0945: Morning medications given, patient sitting up in bed eating breakfast. Denies pain at left hip surgery site. Dressing saturated and loose, removed and new dressing applied to site. 1315:  Incentive spirometer given to patient and education done on how to use per MD orders

## 2021-07-12 ENCOUNTER — APPOINTMENT (OUTPATIENT)
Dept: NON INVASIVE DIAGNOSTICS | Age: 81
DRG: 205 | End: 2021-07-12
Attending: INTERNAL MEDICINE
Payer: MEDICARE

## 2021-07-12 VITALS
BODY MASS INDEX: 31.07 KG/M2 | WEIGHT: 205 LBS | HEIGHT: 68 IN | OXYGEN SATURATION: 100 % | DIASTOLIC BLOOD PRESSURE: 77 MMHG | TEMPERATURE: 98.5 F | SYSTOLIC BLOOD PRESSURE: 131 MMHG | HEART RATE: 64 BPM | RESPIRATION RATE: 18 BRPM

## 2021-07-12 LAB
ANION GAP SERPL CALC-SCNC: 0 MMOL/L (ref 5–15)
BUN SERPL-MCNC: 7 MG/DL (ref 6–20)
BUN/CREAT SERPL: 17 (ref 12–20)
CALCIUM SERPL-MCNC: 9 MG/DL (ref 8.5–10.1)
CHLORIDE SERPL-SCNC: 93 MMOL/L (ref 97–108)
CO2 SERPL-SCNC: 40 MMOL/L (ref 21–32)
CREAT SERPL-MCNC: 0.41 MG/DL (ref 0.55–1.02)
DATE LAST DOSE: ABNORMAL
ECHO AO ROOT DIAM: 3.3 CM
ECHO AV AREA PEAK VELOCITY: 1.94 CM2
ECHO AV AREA PEAK VELOCITY: 1.95 CM2
ECHO AV AREA VTI: 1.85 CM2
ECHO AV AREA/BSA VTI: 0.9 CM2/M2
ECHO AV CUSP MM: 1.96 CM
ECHO AV MEAN GRADIENT: 10.18 MMHG
ECHO AV PEAK GRADIENT: 16.53 MMHG
ECHO AV PEAK GRADIENT: 16.68 MMHG
ECHO AV PEAK VELOCITY: 203.15 CM/S
ECHO AV PEAK VELOCITY: 203.95 CM/S
ECHO AV VTI: 36.65 CM
ECHO EST RA PRESSURE: 8 MMHG
ECHO LA AREA 4C: 29.2 CM2
ECHO LA TO AORTIC ROOT RATIO: 1.08
ECHO LA TO AORTIC ROOT RATIO: 1.08
ECHO LA VOL 2C: 83.94 ML (ref 22–52)
ECHO LA VOL 4C: 100.71 ML (ref 22–52)
ECHO LA VOL BP: 99.61 ML (ref 22–52)
ECHO LA VOL/BSA BIPLANE: 48.12 ML/M2 (ref 16–28)
ECHO LA VOLUME INDEX A2C: 40.55 ML/M2 (ref 16–28)
ECHO LA VOLUME INDEX A4C: 48.65 ML/M2 (ref 16–28)
ECHO LV E' LATERAL VELOCITY: 7.63 CM/S
ECHO LV E' SEPTAL VELOCITY: 3.96 CM/S
ECHO LVOT DIAM: 2.11 CM
ECHO LVOT PEAK GRADIENT: 5.16 MMHG
ECHO LVOT PEAK GRADIENT: 5.17 MMHG
ECHO LVOT PEAK VELOCITY: 112.93 CM/S
ECHO LVOT PEAK VELOCITY: 113.12 CM/S
ECHO LVOT SV: 67.7 ML
ECHO LVOT VTI: 19.3 CM
ECHO MV A VELOCITY: 105.09 CM/S
ECHO MV AREA VTI: 1.47 CM2
ECHO MV E DECELERATION TIME (DT): 298.13 MS
ECHO MV E VELOCITY: 100.07 CM/S
ECHO MV E/A RATIO: 0.95
ECHO MV E/E' LATERAL: 13.12
ECHO MV E/E' RATIO (AVERAGED): 19.19
ECHO MV E/E' SEPTAL: 25.27
ECHO MV MAX VELOCITY: 142.08 CM/S
ECHO MV MEAN GRADIENT: 3.19 MMHG
ECHO MV PEAK GRADIENT: 8.07 MMHG
ECHO MV VTI: 46 CM
ECHO RIGHT VENTRICULAR SYSTOLIC PRESSURE (RVSP): 32.86 MMHG
ECHO RV INTERNAL DIMENSION: 3.84 CM
ECHO TV REGURGITANT MAX VELOCITY: 249.32 CM/S
ECHO TV REGURGITANT MAX VELOCITY: 426.2 CM/S
ECHO TV REGURGITANT PEAK GRADIENT: 24.86 MMHG
GLUCOSE SERPL-MCNC: 91 MG/DL (ref 65–100)
LVOT MG: 2.39 MMHG
PHENYTOIN SERPL-MCNC: 23.1 UG/ML (ref 10–20)
POTASSIUM SERPL-SCNC: 4.8 MMOL/L (ref 3.5–5.1)
REPORTED DOSE,DOSE: ABNORMAL UNITS
REPORTED DOSE/TIME,TMG: ABNORMAL
SODIUM SERPL-SCNC: 133 MMOL/L (ref 136–145)
VANCOMYCIN TROUGH SERPL-MCNC: 14.6 UG/ML (ref 5–10)

## 2021-07-12 PROCEDURE — 97163 PT EVAL HIGH COMPLEX 45 MIN: CPT

## 2021-07-12 PROCEDURE — 97530 THERAPEUTIC ACTIVITIES: CPT | Performed by: OCCUPATIONAL THERAPIST

## 2021-07-12 PROCEDURE — 97165 OT EVAL LOW COMPLEX 30 MIN: CPT | Performed by: OCCUPATIONAL THERAPIST

## 2021-07-12 PROCEDURE — 74011250637 HC RX REV CODE- 250/637: Performed by: INTERNAL MEDICINE

## 2021-07-12 PROCEDURE — 74011250636 HC RX REV CODE- 250/636: Performed by: INTERNAL MEDICINE

## 2021-07-12 PROCEDURE — 36415 COLL VENOUS BLD VENIPUNCTURE: CPT

## 2021-07-12 PROCEDURE — 97530 THERAPEUTIC ACTIVITIES: CPT

## 2021-07-12 PROCEDURE — 77010033678 HC OXYGEN DAILY

## 2021-07-12 PROCEDURE — 80048 BASIC METABOLIC PNL TOTAL CA: CPT

## 2021-07-12 PROCEDURE — 80202 ASSAY OF VANCOMYCIN: CPT

## 2021-07-12 PROCEDURE — 97110 THERAPEUTIC EXERCISES: CPT

## 2021-07-12 PROCEDURE — 80185 ASSAY OF PHENYTOIN TOTAL: CPT

## 2021-07-12 PROCEDURE — C8929 TTE W OR WO FOL WCON,DOPPLER: HCPCS

## 2021-07-12 RX ORDER — PHENYTOIN SODIUM 100 MG/1
200 CAPSULE, EXTENDED RELEASE ORAL 2 TIMES DAILY
Qty: 90 CAPSULE | Refills: 2 | Status: SHIPPED | OUTPATIENT
Start: 2021-07-12 | End: 2021-07-12 | Stop reason: SDUPTHER

## 2021-07-12 RX ORDER — PHENYTOIN SODIUM 100 MG/1
CAPSULE, EXTENDED RELEASE ORAL
Qty: 90 CAPSULE | Refills: 2 | Status: SHIPPED | OUTPATIENT
Start: 2021-07-12

## 2021-07-12 RX ORDER — ATORVASTATIN CALCIUM 10 MG/1
10 TABLET, FILM COATED ORAL EVERY EVENING
COMMUNITY

## 2021-07-12 RX ADMIN — CITALOPRAM HYDROBROMIDE 40 MG: 20 TABLET ORAL at 09:30

## 2021-07-12 RX ADMIN — ALUMINUM HYDROXIDE AND MAGNESIUM HYDROXIDE 15 ML: 200; 200 SUSPENSION ORAL at 11:30

## 2021-07-12 RX ADMIN — ASPIRIN 81 MG: 81 TABLET, COATED ORAL at 09:30

## 2021-07-12 RX ADMIN — FERROUS SULFATE TAB 325 MG (65 MG ELEMENTAL FE) 325 MG: 325 (65 FE) TAB at 09:30

## 2021-07-12 RX ADMIN — CASTOR OIL AND BALSAM, PERU: 788; 87 OINTMENT TOPICAL at 09:30

## 2021-07-12 RX ADMIN — PERFLUTREN 2 ML: 6.52 INJECTION, SUSPENSION INTRAVENOUS at 13:45

## 2021-07-12 RX ADMIN — Medication 10 ML: at 15:50

## 2021-07-12 RX ADMIN — Medication 10 ML: at 06:39

## 2021-07-12 RX ADMIN — DILTIAZEM HYDROCHLORIDE 240 MG: 240 CAPSULE, COATED, EXTENDED RELEASE ORAL at 09:30

## 2021-07-12 RX ADMIN — METOPROLOL SUCCINATE 25 MG: 25 TABLET, EXTENDED RELEASE ORAL at 09:30

## 2021-07-12 RX ADMIN — VANCOMYCIN HYDROCHLORIDE 1000 MG: 1 INJECTION, POWDER, LYOPHILIZED, FOR SOLUTION INTRAVENOUS at 02:08

## 2021-07-12 NOTE — PROGRESS NOTES
Transition of Care Plan: Patient's daughter is here . Patient to be transported by [facility] when oxygen and wheelchair are delivered to the hospital by [facility]. RUR:11%    Disposition: Home with home health services     Follow up appointments: Placed on AVS    DME needed: Wheelchair and oxygen to be delivered to the hospital.[facility] had already had these items approved from the snf where patient was. Per [facility] states they will deliver the potty chair to the patient's residence and daughter made aware. Transportation at Discharge: Daughter    [address] or means to access home:   Daughter has keys       IM Medicare letter: To be given on discharge. Caregiver Contact: Daughter    Discharge Caregiver contacted prior to discharge? Caregiver contacted today. Reason for Readmission: Patient came to ed with complaint of sob and per daughter she was more confused. She was recently hospitalized for hip fracture and had surgery done. Patient was at [facility] for rehab and was due to be discharged today. RUR Score/Risk Level:  11%       PCP: First and Last name:  Edwin Silva     Name of Practice: [facility]     Are you a current patient: Yes/No: Yes     Approximate date of last visit:  Approximately one month ago . Can you participate in a virtual visit with your PCP: No      Is a Care Conference indicated:  No      Did you attend your follow up appointment (s): If not, why not: No, patient was in snf. Resources/supports as identified by patient/family:  Patient has supportive family        Top Challenges facing patient (as identified by patient/family and CM): Finances/Medication cost?   Family denies financial or medication issues. Transportation    Daughter drives patient to her appointments. Support system or lack thereof? Patient has supportive family. Living arrangements?    She will be with her daughter in one level home when discharged. Self-care/ADLs/Cognition? Patient is able to feed and dress herself. She needs assistance with bathing. Current Advanced Directive/Advance Care Plan:    Advance Care Planning     General Advance Care Planning (ACP) Conversation      Date of Conversation: 7/12/21  Conducted with: Patient with Decision Making Capacity and Healthcare Decision Maker: Next of Kin by law (only applies in absence of a Healthcare Power of  or Legal Guardian)    Healthcare Decision Maker:     Primary Decision Maker: Jj Neri - Child - [phone]    Primary Decision Maker: William Slater Child - [phone]    Primary Decision Maker: Carlos Padilla - Child - [phone]  Click here to [address] including selection of the Healthcare Decision Maker Relationship (ie \"Primary\")      Content/Action Overview:   DECLINED ACP conversation - will revisit periodically   Reviewed DNR/DNI and patient elects Full Code (Attempt Resuscitation)         Length of Voluntary ACP Conversation in minutes:  <16 minutes (Non-Billable)    Torito Venegas RN                        Plan for utilizing home health:  Patient will be using [address] [place] [address] home health services when discharged. Transition of Care Plan:    Based on readmission, the patient's previous Plan of Care   has been evaluated and/or modified. The current Transition of Care Plan is: For patient to go home with daughter with home health services from [address] [place] [address]. Referral sent to [address] [place] [address] for home health services and they have accepted. FOC obtained and placed with medical records. Per liason at [address] [place] [address] , they do not have a aide at this time and will have occupational therapy assess the patient . They will call the daughter and let her know of this . Name and number of the daughter given to [address] [place] [address] to follow up.  Informed daughter per [facility] will be bringing the wheelchair and oxygen to the hospital so patient can discharge today. Daughter informed to call [facility] prior to leaving the hospital so they can come out to the home and set up the oxygen. Daughter was given the number for [facility] and also placed information on the AVS. Per  [facility] liason states they will deliver the potty chair to the home once they speak with the  at [address] [place] [address],[address]. Patricia Zhao RN BSN CRM        [phone]               Readmission Assessment  Number of days since last admission?: 1-7 days  Previous disposition: SNF  Who is being interviewed?: Caregiver  What was the patient's/caregiver's perception as to why they think they needed to return back to the hospital?: Other (Comment) (Patient became ill at the snf)  Did you visit your Primary Care Physician after you left the hospital, before you returned this time?: No  Why weren't you able to visit your PCP?: Other (Comment) (Patient was in the skilled nursing facility.)  Did you see a specialist, such as Cardiac, Pulmonary, Orthopedic Physician, etc. after you left the hospital?: No  Who advised the patient to return to the hospital?: Physician's Nurse/Office Staff  Does the patient report anything that got in the way of taking their medications?: No  In our efforts to provide the best possible care to you and others like you, can you think of anything that we could have done to help you after you left the hospital the first time, so that you might not have needed to return so soon?: Other (Comment) (N/A)

## 2021-07-12 NOTE — PROGRESS NOTES
1900  Bedside and Verbal shift change report given to Gaby Moore RN (oncoming nurse) by Lukasz Perera RN (offgoing nurse). Report included the following information SBAR, Kardex, Intake/Output and Recent Results. End of Shift Note    Bedside shift change report given to Vonnie Moser (oncoming nurse) by Marya Hughes (offgoing nurse). Report included the following information SBAR, Kardex, Intake/Output and Recent Results    Shift worked:  7p-7a     Shift summary and any significant changes:       No significant changes   Concerns for physician to address: none   Zone phone for oncoming shift:  [phone]     Activity:  Activity Level: Up with Assistance  Number times ambulated in hallways past shift: 0  Number of times OOB to chair past shift: 0    Cardiac:   Cardiac Monitoring: Yes      Cardiac Rhythm: Sinus Rhythm    Access:   Current line(s): PIV     Genitourinary:   Urinary status: voiding and external catheter    Respiratory:   O2 Device: Nasal cannula  Chronic home O2 use?: NO  Incentive spirometer at bedside: YES     GI:  Last Bowel Movement Date:  (PTA)  Current diet:  ADULT DIET Regular; Low Fat/Low Chol/High Fiber/2 gm Na  Passing flatus: YES  Tolerating current diet: YES       Pain Management:   Patient states pain is manageable on current regimen: YES    Skin:  Greg Score: 15  Interventions: speciality bed, float heels, increase time out of bed and foam dressing    Patient Safety:  Fall Score:  Total Score: 5  Interventions: bed/chair alarm, assistive device (walker, cane, etc), gripper socks and pt to call before getting OOB  High Fall Risk: Yes    Length of Stay:  Expected LOS: - - -  Actual LOS: 2      Marya Hughes

## 2021-07-12 NOTE — PROGRESS NOTES
Pharmacy Antimicrobial Kinetic Dosing    Indication for Antimicrobials: CAP     Current Regimen of Each Antimicrobial:  Vancomycin 1000mg IV q12h - started 7/10 day 3  Levofloxacin 750mg IV q24h - started 7/10 day 3    Previous Antimicrobial Therapy:  Nitrofurantoin OP     Abx Allergies:  PCN = swelling    Vancomycin Trough Goal Level:  15 - 20     Date Dose & Interval Measured (mcg/mL) Extrapolated (mcg/mL)    01:30 1000mg IV q12h 14.6                  Significant Positive Cultures:   7/10 BCx - pending  7/10 R panel w/ COVID - negitve    Conditions for Dosing Consideration: None    Labs:  Recent Labs     21  0157 21  0438 07/10/21  1047   CREA 0.41* 0.36* 0.40*   BUN 7 6 8     Recent Labs     21  0438 07/10/21  1047   WBC 8.1 9.1     Temp (24hrs), Av °F (36.7 °C), Min:97.8 °F (36.6 °C), Max:98.5 °F (36.9 °C)    Creatinine Clearance (mL/min):   CrCl (Ideal Body Weight): 64.7   If actual weight < IBW: CrCl (Actual Body Weight) 94.1    Impression/Plan:   Vancomycin level therapeutic, continue current dosing  Monitoring QTc daily given LQ + Celexa (7/10 494,  pending)  BMP and CBC for  already ordered   Antimicrobial stop date - pending     Pharmacy will follow daily and adjust medications as appropriate for renal function and/or serum levels.     Thank you,  Josie Gooden, PHARMD

## 2021-07-12 NOTE — DISCHARGE SUMMARY
Hospitalist Discharge Summary     Patient ID:  Ascencion Pearl  [id number]  87 y.o.  1940  7/10/2021    PCP on record: Joseph Wiggins MD    Admit date: 7/10/2021  Discharge date and time: 7/12/2021    DISCHARGE DIAGNOSIS:    Acute respiratory failure with hypoxia most likely secondary to atelectasis  HCAP ruled out  Recent left femoral neck fracture this post left hip hemiarthroplasty  Hypertension, uncontrolled  Sinus bradycardia  Chronic A. fib  History of seizures disorder      CONSULTATIONS:  None    Excerpted HPI from H&P of Louise Conde MD:    Ascencion Pearl is a [de-identified] y.o.  female past medical history of recent hip fracture, hypertension, A. fib, history of seizures who presents with shortness of breath and productive cough. patient is a poor historian  In the ED, patient was found to have low-grade temperature, was hypertensive and bradycardic,  also hypoxic requiring 4 L of oxygen  Her labs revealed normal CBC, mild hyponatremia, mildly elevated proBNP  X-ray showed Bibasilar airspace opacities may reflect atelectasis or pneumonia  with possible pleural effusions.      We were asked to admit for work up and evaluation of the above problems.      ______________________________________________________________________  DISCHARGE SUMMARY/HOSPITAL COURSE:  for full details see H&P, daily progress notes, labs, consult notes. Acute respiratory failure with hypoxia most likely secondary to HCAP versus atelectasis  on admission she required 4 L of oxygen  currently at   empirically started on vancomycin and Levaquin  patient does not have white count or fever, pro calcitonin level is normal  Respiratory virus panel is negative  added incentive spirometry  Follow-up blood cultures,   COVID-19 testing negative  CTA of the chest findings as below     1. Enlarged pulmonary arteries compatible pulmonary artery hypertension with no  acute pulmonary emboli.   2. Dilated cardiomegaly with coronary artery disease. 3. Small bilateral pleural effusions with overlying atelectasis. 4. Suspected cirrhosis and additional incidental findings as above. DC antibiotics as symptoms likely due to atelectasis  Home health requested on discharge as family does not want patient to go back to [facility]     Recent left femoral neck fracture this post left hip hemiarthroplasty  Continue with aspirin 81mg twice daily     Hypertension, uncontrolled  Sinus bradycardia  Chronic A. fib  Continue with home BP meds Cardizem and metoprolol with holding parameters  Patient chronically not on AC due to high risk for falls  History of seizures disorder  Dilantin levels are supratherapeutic  Adjusted dose to 200mg in am and 100mg at bedtime      _______________________________________________________________________  Patient seen and examined by me on discharge day. Pertinent Findings:  Gen:    Not in distress  Chest: Clear lungs  CVS:   Regular rhythm. No edema  Abd:  Soft, not distended, not tender  Neuro:  Alert,oriented x4  _______________________________________________________________________  DISCHARGE MEDICATIONS:   Current Discharge Medication List      CONTINUE these medications which have CHANGED    Details   phenytoin ER (DILANTIN ER) 100 mg ER capsule Take 2 Capsules by mouth two (2) times a day. 200mg in am and 100mg in pm  Qty: 90 Capsule, Refills: 2  Start date: 7/12/2021         CONTINUE these medications which have NOT CHANGED    Details   aspirin delayed-release 81 mg tablet Take 1 Tablet by mouth two (2) times a day for 30 days. Qty: 60 Tablet, Refills: 0      acetaminophen (TylenoL) 325 mg tablet Take 2 Tablets by mouth every four (4) hours as needed for Pain for up to 15 days. Qty: 30 Tablet, Refills: 0      metoprolol succinate (TOPROL-XL) 50 mg XL tablet Take 50 mg by mouth daily. citalopram (CELEXA) 40 mg tablet Take 40 mg by mouth daily.       diltiazem CD (CARTIA XT) 240 mg ER capsule Take 240 mg by mouth daily. ferrous sulfate (IRON) 325 mg (65 mg Iron) tablet Take 65 mg by mouth two (2) times a day. simvastatin (ZOCOR) 40 mg tablet Take 40 mg by mouth nightly. STOP taking these medications       nitrofurantoin, macrocrystal-monohydrate, (MACROBID) 100 mg capsule Comments:   Reason for Stopping:                 Patient Follow Up Instructions: Activity: Activity as tolerated  Diet: Regular Diet  Wound Care: None needed    Follow-up with PCP and Ortho in one week .   Follow-up tests/labs none  Follow-up Information     Follow up With Specialties Details Why Contact Juliana Rodriguez MD Internal Medicine In 1 week  [facility]  [phone]          ________________________________________________________________    Risk of deterioration: High    Condition at Discharge:  Stable  __________________________________________________________________    Disposition  Home with family and home health services    ____________________________________________________________________    Code Status: Full Code  ___________________________________________________________________      Total time in minutes spent coordinating this discharge (includes going over instructions, follow-up, prescriptions, and preparing report for sign off to her PCP) :  40 minutes    Signed:  Selin Lozoya MD

## 2021-07-12 NOTE — PROGRESS NOTES
Due to recent hip surgery in PT note from few days ago showing patient requires maximum assist.  She would benefit from ambulance transport to her home to minimize the risk of fall

## 2021-07-12 NOTE — PROGRESS NOTES
Problem: Mobility Impaired (Adult and Pediatric)  Goal: *Acute Goals and Plan of Care (Insert Text)  Description: FUNCTIONAL STATUS PRIOR TO ADMISSION: Patient was sba using a rolling walker for functional mobility. Patient required moderate assistance for basic and instrumental ADLs. Sleeps and spends all day in recliner chair. Was able to ambulate a short distance(15-20 ft from chair to bathroom toilet. HOME SUPPORT PRIOR TO ADMISSION: The patient lived with family/daughter to provide assistance. 1 story home with ramp to enter and small threshold at entrance to home. She was transferred to the hospital from a SNF and her family plans to take her home now. Physical Therapy Goals  Initiated 7/12/2021  1. Patient will move from supine to sit and sit to supine , scoot up and down, and roll side to side in bed with minimal assistance/contact guard assist within 7 day(s). 2.  Patient will transfer from bed to chair and chair to bed with minimal assistance/contact guard assist using the least restrictive device within 7 day(s). 3.  Patient will perform sit to stand with minimal assistance/contact guard assist within 7 day(s). 4.  Patient will ambulate with minimal assistance/contact guard assist for 25 feet with the least restrictive device within 7 day(s). 5.  Patient/family will bed supervision with THR HEP within 7 days. Outcome: Not Met   PHYSICAL THERAPY EVALUATION  Patient: Herber Mcadams (89 y.o. female)  Date: 7/12/2021  Primary Diagnosis: Sepsis ([facility] [A41.9]  Pneumonia [J18.9]        Precautions:  Fall, WBAT, Total hip, Back (L ant hemiarthroplasty 7/2/21; spinal stenosis)    ASSESSMENT  Based on the objective data described below, the patient presents with generalized weakness with LLE being the most impaired, decreased activity tolerance, but VSS, decreased sitting and standing balance with impaired mobility skills requiring 2 person assistance for basic mobility.  Per her daughter, prior to recent fall and L hip replacement patient was very sedentary and spent all day and night in her recliner chair. Today she was able to be assisted through her THR exercises and then come to sitting and transfer/step to recliner with RW and 2 person assistance. Will need 20 inch wide manual wheelchair due to BMI and hip width as well as BSC. Recommend HH PT/OT upon discharge. Vitals:    07/12/21 0930 07/12/21 1006 07/12/21 1019 07/12/21 1039   BP: (!) 143/53 (!) 157/67 (!) 138/54 (!) 115/55   BP 1 Location:       BP Patient Position: Semi mccann in bed Sitting Sitting;Standing;Walking  Comment: in chair post transfer/steps    Pulse: 73 76 71 65   Temp:    97.8 °F (36.6 °C)   Resp:    17   Height:       Weight:       SpO2:  97% 1L O2 96% 1 L O2 92%        Current Level of Function Impacting Discharge (mobility/balance): max a x 2 supine to sit; mod a x 2 sit to stand; mod a x 2 ambulation with RW for 5 feet bed to recliner. Functional Outcome Measure: The patient scored 20/100 on the Barthel outcome measure which is indicative of 80% functional impairment indicating severe dependence for most.      Other factors to consider for discharge: high fall risk, supportive family; home with ramp. Patient will benefit from skilled therapy intervention to address the above noted impairments. PLAN :  Recommendations and Planned Interventions: bed mobility training, transfer training, gait training, therapeutic exercises, neuromuscular re-education, edema management/control, patient and family training/education, and therapeutic activities      Frequency/Duration: Patient will be followed by physical therapy:  5 times a week to address goals. Recommendation for discharge: (in order for the patient to meet his/her long term goals)  Physical therapy at least 2 days/week in the home AND ensure assist and/or supervision for safety with mobility and ADLs.     This discharge recommendation:  Has been made in collaboration with the attending provider and/or case management    IF patient discharges home will need the following DME: bedside commode and 20 inch wide wheelchair       SUBJECTIVE:   Patient stated I haven't done any exercises or been out of bed.     OBJECTIVE DATA SUMMARY:   HISTORY:    Past Medical History:   Diagnosis Date    Arthritis     Bladder disorder     Cancer ([facility]     breast cancer    Gastrointestinal disorder     acid reflux    GERD (gastroesophageal reflux disease)     High cholesterol     Hypertension     Other ill-defined conditions(799.89)     pneumonia    Psychiatric disorder     depression    Seizures (HCC)     Stroke ([facility]     Thromboembolus ([facility]      Past Surgical History:   Procedure Laterality Date    HX CATARACT REMOVAL      bilateral    HX  SECTION      x 3    HX CHOLECYSTECTOMY      HX ORTHOPAEDIC      left arm, left leg, right wrist    HX ORTHOPAEDIC  13    ELBOW OPEN REDUCTION INTERNAL FIXATION (Right    HX OTHER SURGICAL      Brain surgery s/p CVA    HX OTHER SURGICAL      Bladder stimulator 12    HX OTHER SURGICAL      gastric bypass    [place] BREAST SURGERY PROCEDURE UNLISTED      leftt breast lumpectomy       Personal factors and/or comorbidities impacting plan of care: seizure d/o, htn, recent L anterior hemiarthroplasty    Home Situation  # Steps to Enter: 0  Wheelchair Ramp: Yes  One/Two Story Residence: One story  Living Alone: No (daughter and family)  Support Systems: Family member(s)  Patient Expects to be Discharged to[de-identified] Other (comment)  Current DME Used/Available at Home: Cane, straight, Safety frame toliet, Walker, rolling, Other (comment)  Tub or Shower Type: Tub/Shower combination    EXAMINATION/PRESENTATION/DECISION MAKING:   Critical Behavior:  Neurologic State: Alert  Orientation Level: Oriented to person, Oriented to place  Cognition: Decreased attention/concentration, Follows commands, Impaired decision making, Memory loss (unreliable )  Safety/Judgement: Decreased awareness of need for assistance, Decreased awareness of need for safety, Decreased insight into deficits, Fall prevention  Hearing: Auditory  Auditory Impairment: None  Skin:  No findings  Edema: mild L ankle  Range Of Motion:  AROM: Generally decreased, functional (except LL limited by weakness)           PROM: Generally decreased, functional (L hip decreased 25%; otherwise WFL)           Strength:    Strength: Generally decreased, functional (RLE 4-/5; L: ankle 3-/5, knee 3-/5, hip 2-/5)                    Tone & Sensation:   Tone: Normal              Sensation: Intact               Coordination:  Coordination: Generally decreased, functional  Vision:   Corrective Lenses:  (none had catarct surgery)  Functional Mobility:  Bed Mobility:  Rolling: Moderate assistance;Maximum assistance; Additional time  Supine to Sit: Maximum assistance;Assist x2; Additional time     Scooting: Maximum assistance; Additional time  Transfers:  Sit to Stand: Moderate assistance;Assist x2  Stand to Sit: Moderate assistance        Bed to Chair: Moderate assistance;Assist x2; Additional time; Adaptive equipment (RW)              Balance:   Sitting: Impaired  Sitting - Static: Fair (occasional)  Sitting - Dynamic: Fair (occasional)  Standing: Impaired  Standing - Static: Poor;Constant support  Standing - Dynamic : Poor;Constant support  Ambulation/Gait Training:  Distance (ft): 4 Feet (ft)  Assistive Device: Gait belt;Walker, rolling  Ambulation - Level of Assistance: Moderate assistance;Assist x2; Additional time     Gait Description (WDL): Exceptions to WDL  Gait Abnormalities: Decreased step clearance; Step to gait (leans fwd on arms on RW hand rests; )  Right Side Weight Bearing: Full  Left Side Weight Bearing: As tolerated  Base of Support: Widened  Stance: Left decreased  Speed/Fang: Slow;Shuffled  Step Length: Right shortened;Left shortened        Interventions: Safety awareness training; Tactile cues; Verbal cues        Therapeutic Exercises: Ankle pumps, quad sets, glut sets, heel slides and hip abd    Functional Measure:  Barthel Index:    Bathin  Bladder: 0  Bowels: 5  Groomin  Dressin  Feedin  Mobility: 0  Stairs: 0  Toilet Use: 5  Transfer (Bed to Chair and Back): 5  Total: 20/100       The Barthel ADL Index: Guidelines  1. The index should be used as a record of what a patient does, not as a record of what a patient could do. 2. The main aim is to establish degree of independence from any help, physical or verbal, however minor and for whatever reason. 3. The need for supervision renders the patient not independent. 4. A patient's performance should be established using the best available evidence. Asking the patient, friends/relatives and nurses are the usual sources, but direct observation and common sense are also important. However direct testing is not needed. 5. Usually the patient's performance over the preceding 24-48 hours is important, but occasionally longer periods will be relevant. 6. Middle categories imply that the patient supplies over 50 per cent of the effort. 7. Use of aids to be independent is allowed. Nathan Hyatt., Barthel, D.W. ([id number]). Functional evaluation: the Barthel Index. [address] [facility] (14)2. Tiara Juniper natalie [place], J.J.M.F, Megan Sullivan., Arnulfo Erickson., [place], [address] [place] [address] (). Measuring the change indisability after inpatient rehabilitation; comparison of the responsiveness of the Barthel Index and Functional [place] Measure. Journal of Neurology, Neurosurgery, and Psychiatry, 66(4), 833-822. Radha Claudio, N.J.A, JUSTYNA Joshi, & Liat Arenas, M.A. (2004.) Assessment of post-stroke quality of life in cost-effectiveness studies: The usefulness of the Barthel Index and the EuroQoL-5D.  Quality of Life Research, 15, 625-54        Physical Therapy Evaluation Charge Determination   History Examination Presentation Decision-Making HIGH Complexity :3+ comorbidities / personal factors will impact the outcome/ POC  HIGH Complexity : 4+ Standardized tests and measures addressing body structure, function, activity limitation and / or participation in recreation  MEDIUM Complexity : Evolving with changing characteristics  Other outcome measures Barthel  HIGH       Based on the above components, the patient evaluation is determined to be of the following complexity level: MEDIUM    Pain Rating:  Lower back pain increases with standing due to stenosis    Activity Tolerance:   Fair, SpO2 stable on RA and requires rest breaks    After treatment patient left in no apparent distress:   Sitting in chair, Call bell within reach and Caregiver / family present    COMMUNICATION/EDUCATION:   The patients plan of care was discussed with: Occupational therapist, Registered nurse, Physician and Case management. Fall prevention education was provided and the patient/caregiver indicated understanding., Patient/family have participated as able in goal setting and plan of care. and Patient/family agree to work toward stated goals and plan of care.     Thank you for this referral.  Katty Silva, PT   Time Calculation: 45 mins

## 2021-07-12 NOTE — PROGRESS NOTES
Transition of Care Plan: Patient's daughter is here . Patient to be transported by [facility] when oxygen and wheelchair are delivered to the hospital by [facility].        RUR:11%     Disposition: Home with home health services      Follow up appointments: Placed on AVS     DME needed: Wheelchair and oxygen to be delivered to the hospital.[facility] had already had these items approved from the snf where patient was. Per [facility] states they will deliver the potty chair to the patient's residence and daughter made aware. Transportation at Discharge: Daughter     Keys or means to access home:   Daughter has keys       IM Medicare letter: To be given on discharge.      Caregiver Contact: Daughter     Discharge Caregiver contacted prior to discharge? Caregiver contacted today.       Patient was informed by [facility] that they are unable to provide her with an aide and they have denied because they states she insists she needed and aide. Called patient and asked her if she had another home health agency and she will ask one of her friends and get back with me. She states she will be unavailable from 1430pm to 1530pm.      Patricia Zhao RN BSN CRM        [phone]

## 2021-07-12 NOTE — PROGRESS NOTES
Occupational Therapy  Orders received and medical record reviewed. Pt participated in OT Evaluation. Her daughter (whom she lives with was present) wishes that pt discharge home and not return to her rehab program.  Pt needs assist X2 for mobility. At baseline, pt has an aide who assists her with adls. Full OT note to follow.

## 2021-07-12 NOTE — PROGRESS NOTES
Problem: Self Care Deficits Care Plan (Adult)  Goal: *Acute Goals and Plan of Care (Insert Text)  Description:   FUNCTIONAL STATUS PRIOR TO ADMISSION: pt was admitted from rehab and reports she had not yet started her rehab program.  Pt reports that she's been in bed for several days. At baseline pt lives with her daughter and has the assistance of a paid caregiver and her daughter and son in law. Per daughter, caregiver provides bathing assist and prn dressing assistance. Daughter provides IADLs. Pt sleeps in a recliner in her room and family delivers her meals to her there. Pt is mostly sedentary, but is able to use her RW to get to and from the  bathroom and performs toileting. Pt has hx of dementia and is not a reliable .,    HOME SUPPORT: The patient lived with family and had a paid caregiver. Occupational Therapy Goals  Initiated 7/12/2021  1. Patient will perform grooming with supervision/set-up within 7 day(s). 2.  Patient will perform upper body dressing and lower body dressing with moderate assistance  within 7 day(s). 3.  Patient will perform toilet transfers with moderate assistance  within 7 day(s). 4.  Patient will perform all aspects of toileting with moderate assistance  within 7 day(s). 5.  Patient will participate in upper extremity therapeutic exercise/activities with supervision/set-up for 8 minutes within 7 day(s). Outcome: Not Met   OCCUPATIONAL THERAPY EVALUATION  Patient: Keenan Hicks (84 y.o. female)  Date: 7/12/2021  Primary [facility]: [facility] [A41.9]  Pneumonia [J18.9]        Precautions:   Fall, WBAT, Back (L ant hemiarthroplasty 7/2/21; spinal stenosis)    ASSESSMENT  Based on the objective data described below, the patient presents with recent admission and L hip hemiarthroplasty(ant.)  and history of dementia (non reliable ) admitted from rehab due to sepsis/PNA.   Pt performed bed mobility, and took steps from bed to chair, slowly with assist X2, demonstrating poor tolerance for adls and mobility--VSS. Pt's daughter was present and reported that pt had a caregiver for adls at baseline and would continue that at discharge. Daughter wishes pt to discharge home and is aware of the additional assistance that pt will require. Pt is functioning below her baseline and will benefit from acute OT services. Family's plan is for pt to return home at discharge. Recommend 24 hour assistance and HH therapy at discharge    Current Level of Function Impacting Discharge (ADLs/self-care): up to maximal assistance, assist X2 for short distance transfer bed to chair using RW. Functional Outcome Measure: The patient scored Total: 20/100 on the Barthel Index outcome measure which is indicative of 80% impaired ability to care for basic self needs/dependency on others; inferred  dependency (pt's baseline) on others for instrumental ADLs. Other factors to consider for discharge: limited mobility and require assist X2     Patient will benefit from skilled therapy intervention to address the above noted impairments. PLAN :  Recommendations and Planned Interventions: self care training, functional mobility training, therapeutic exercise, balance training, therapeutic activities, endurance activities, patient education, home safety training, and family training/education    Frequency/Duration: Patient will be followed by occupational therapy 3 times a week to address goals.     Recommendation for discharge: (in order for the patient to meet his/her long term goals)  Occupational therapy at least 2 days/week in the home AND ensure assist and/or supervision for safety with adls and mobility    This discharge recommendation:  Has been made in collaboration with the attending provider and/or case management    IF patient discharges home will need the following DME: bedside commode and wheelchair       SUBJECTIVE:   Patient reported that she didn't get therapy    OBJECTIVE DATA SUMMARY:   HISTORY:   Past Medical History:   Diagnosis Date    Arthritis     Bladder disorder     Cancer [facility])     breast cancer    Gastrointestinal disorder     acid reflux    GERD (gastroesophageal reflux disease)     High cholesterol     Hypertension     Other ill-defined conditions(799.89)     pneumonia    Psychiatric disorder     depression    Seizures (HCC)     [facility] ([facility]     Thromboembolus ([facility]      Past Surgical History:   Procedure Laterality Date    HX CATARACT REMOVAL      bilateral    HX  SECTION      x 3    HX CHOLECYSTECTOMY      HX ORTHOPAEDIC      left arm, left leg, right wrist    HX ORTHOPAEDIC  13    ELBOW OPEN REDUCTION INTERNAL FIXATION (Right    HX OTHER SURGICAL      Brain surgery s/p CVA    HX OTHER SURGICAL      Bladder stimulator 12    HX OTHER SURGICAL      gastric bypass    [place] BREAST SURGERY PROCEDURE UNLISTED      leftt breast lumpectomy       Expanded or extensive additional review of patient history:     Home Situation  # Steps to Enter: 0  Wheelchair Ramp: Yes  One/Two Story Residence: One story  Living Alone: No (daughter and family)  Support Systems: Family member(s)  Patient Expects to be Discharged to[de-identified] Other (comment)  Current DME Used/Available at Home: Cane, straight, Safety frame toliet, Walker, rolling, Other (comment)  Tub or Shower Type: Tub/Shower combination    Hand dominance: Right    EXAMINATION OF PERFORMANCE DEFICITS:  Cognitive/Behavioral Status:  Neurologic State: Alert  Orientation Level: Oriented to person;Oriented to place  Cognition: Decreased attention/concentration; Follows commands; Impaired decision making;Memory loss (unreliable )  Perception: Appears intact  Perseveration: No perseveration noted  Safety/Judgement: Decreased awareness of need for assistance;Decreased awareness of need for safety;Decreased insight into deficits; Fall prevention    Skin: generally intact, incision not observed    Edema: none observed    Hearing: Auditory  Auditory Impairment: None    Vision/Perceptual:                                Corrective Lenses:  (none had catarct surgery)    Range of Motion:  BUEs  AROM: Generally decreased, functional   PROM: Generally decreased, functional                    Strength:  BUEs:  Strength: Generally decreased, functional            Coordination:  Coordination: Generally decreased, functional  Fine Motor Skills-Upper: Left Intact; Right Intact (noted ulnar drift R hand)    Gross Motor Skills-Upper: Left Intact; Right Intact (reports some shoulder issues/)    Tone & Sensation:    Tone: Normal  Sensation: Intact                      Balance:  Sitting: Impaired  Sitting - Static: Fair (occasional)  Sitting - Dynamic: Fair (occasional)  Standing: Impaired  Standing - Static: Poor;Constant support  Standing - Dynamic : Poor;Constant support    Functional Mobility and Transfers for ADLs:  Bed Mobility:  Rolling: Moderate assistance;Maximum assistance; Additional time  Supine to Sit: Maximum assistance;Assist x2; Additional time  Scooting: Maximum assistance; Additional time    Transfers:  Sit to Stand: Moderate assistance;Assist x2  Stand to Sit: Moderate assistance  Bed to Chair: Moderate assistance;Assist x2; Additional time; Adaptive equipment (RW)    ADL Assessment:  Feeding: Independent    Oral Facial Hygiene/Grooming: Stand-by assistance;Setup;Minimum assistance    Bathing: Maximum assistance (this is baseline)    Upper Body Dressing: Moderate assistance    Lower Body Dressing: Maximum assistance    Toileting: Maximum assistance                ADL Intervention and task modifications:     Pt is able to feed self (but does not like the food) and perform grooming if set up                                Cognitive Retraining  Safety/Judgement: Decreased awareness of need for assistance;Decreased awareness of need for safety;Decreased insight into deficits; Fall prevention    Therapeutic Exercise:  Encouraged OOB to chair   Functional Measure:  Barthel Index:    Bathin  Bladder: 0  Bowels: 5  Groomin  Dressin  Feedin  Mobility: 0  Stairs: 0  Toilet Use: 5  Transfer (Bed to Chair and Back): 5  Total: 20/100        The Barthel ADL Index: Guidelines  1. The index should be used as a record of what a patient does, not as a record of what a patient could do. 2. The main aim is to establish degree of independence from any help, physical or verbal, however minor and for whatever reason. 3. The need for supervision renders the patient not independent. 4. A patient's performance should be established using the best available evidence. Asking the patient, friends/relatives and nurses are the usual sources, but direct observation and common sense are also important. However direct testing is not needed. 5. Usually the patient's performance over the preceding 24-48 hours is important, but occasionally longer periods will be relevant. 6. Middle categories imply that the patient supplies over 50 per cent of the effort. 7. Use of aids to be independent is allowed. Guillermina Hu., Barthel, D.W. ([id number]). Functional evaluation: the Barthel Index. [address] [facility] (14)2. [facility] natalie YANIRA Trujillo, Kathaleen Boas., [facility] [place], [address] [place] [address] Ave (). Measuring the change indisability after inpatient rehabilitation; comparison of the responsiveness of the Barthel Index and Functional [place] Measure. Journal of Neurology, Neurosurgery, and Psychiatry, 66(4), 912-106. Anup Aranda, N.J.A, JUSTYNA Joshi, & Kassidy Brown MTJ. (2004.) Assessment of post-stroke quality of life in cost-effectiveness studies: The usefulness of the Barthel Index and the EuroQoL-5D.  Quality of Life Research, 15, 079-63         Occupational Therapy Evaluation Charge Determination   History Examination Decision-Making   MEDIUM Complexity : Expanded review of history including physical, cognitive and psychosocial history  MEDIUM Complexity : 3-5 performance deficits relating to physical, cognitive , or psychosocial skils that result in activity limitations and / or participation restrictions MEDIUM Complexity : Patient may present with comorbidities that affect occupational performnce. Miniml to moderate modification of tasks or assistance (eg, physical or verbal ) with assesment(s) is necessary to enable patient to complete evaluation       Based on the above components, the patient evaluation is determined to be of the following complexity level: MEDIUM  Pain Rating:  Pt did not rate pain    Activity Tolerance:   Fair. Pt unable to tolerate further activity post  transfer to chair on 1 liter NC  VSS on 1 Liter NC    After treatment patient left in no apparent distress:    Sitting in chair, Call bell within reach, Caregiver / family present, and nurse informed    COMMUNICATION/EDUCATION:   The patients plan of care was discussed with: Physical therapist, Registered nurse, and Case management. Patient/family have participated as able in goal setting and plan of care. This patients plan of care is appropriate for delegation to [facility].     Thank you for this referral.  Wero Lopez, OTR/L    55 minutes

## 2021-07-12 NOTE — PROGRESS NOTES
Pharmacist Discharge Medication Reconciliation    Significant PMH:   Past Medical History:   Diagnosis Date    Arthritis     Bladder disorder     Cancer ([facility]     breast cancer    Gastrointestinal disorder     acid reflux    GERD (gastroesophageal reflux disease)     High cholesterol     Hypertension     Other ill-defined conditions(799.89)     pneumonia    Psychiatric disorder     depression    Seizures ([facility]     Stroke ([facility]     Thromboembolus ([facility]      Encounter Diagnoses:   Encounter Diagnoses   Name Primary? Acute respiratory failure with hypoxia (HCC) Yes    Pneumonia of both lower lobes due to infectious organism      Allergies: Codeine and Penicillins    Discharge Medications:   Current Discharge Medication List        CONTINUE these medications which have CHANGED    Details   phenytoin ER (DILANTIN ER) 100 mg ER capsule Take 200 mg (2 capsules)  in the morning and 100 mg (1 capsule) in the evening  Qty: 90 Capsule, Refills: 2  Start date: 7/12/2021           CONTINUE these medications which have NOT CHANGED    Details   atorvastatin (LIPITOR) 10 mg tablet Take 10 mg by mouth every evening. aspirin delayed-release 81 mg tablet Take 1 Tablet by mouth two (2) times a day for 30 days. Qty: 60 Tablet, Refills: 0      acetaminophen (TylenoL) 325 mg tablet Take 2 Tablets by mouth every four (4) hours as needed for Pain for up to 15 days. Qty: 30 Tablet, Refills: 0      metoprolol succinate (TOPROL-XL) 50 mg XL tablet Take 50 mg by mouth daily. citalopram (CELEXA) 40 mg tablet Take 40 mg by mouth daily. diltiazem CD (CARTIA XT) 240 mg ER capsule Take 240 mg by mouth daily. ferrous sulfate (IRON) 325 mg (65 mg Iron) tablet Take 65 mg by mouth two (2) times a day.            STOP taking these medications       nitrofurantoin, macrocrystal-monohydrate, (MACROBID) 100 mg capsule Comments:   Reason for Stopping:         simvastatin (ZOCOR) 40 mg tablet Comments:   Reason for Stopping: The patient's chart, MAR and AVS were reviewed by Cesar Stewart [facility].     Discharging Provider: Levi Alvares MD    Thank you,     Cesar Stewart, [address] [facility]

## 2021-07-12 NOTE — PROGRESS NOTES
Bedside and Verbal shift change report given to Aileen Severs (oncoming nurse) by Bhavik Nugent (offgoing nurse). Report included the following information SBAR, Kardex, Procedure Summary, Intake/Output, MAR and Recent Results. 0900: Patient oxygen saturations at 84% on room air, patient placed back on 2 liters nasal cannula while sitting up in bed.     0935: Physical therapy at bedside to work with patient    1135: Patient sitting up in recliner with complaints of indigestion. Maalox ordered and given    1300: Patient off unit for scheduled ECHO    1415: Patient back from ECHO, resting in bed    1700: [facility] at bedside to transport patient home. Spoke with daughter on phone, she is at home waiting on patient to arrive.  Wheelchair and oxygen transported with [facility]

## 2021-07-12 NOTE — PROGRESS NOTES
Transition of Care Plan: Patient's daughter is here . Patient to be transported by [facility] when oxygen and wheelchair are delivered to the hospital by [facility].        RUR:11%     Disposition: Home with home health services      Follow up appointments: Placed on AVS     DME needed: Wheelchair and oxygen to be delivered to the hospital.[facility] had already had these items approved from the snf where patient was. Per [facility] states they will deliver the potty chair to the patient's residence and daughter made aware.       Transportation at [place] [address] or means to access home:   Daughter has keys        Medicare letter: To be given on discharge.      Caregiver Contact: Daughter     Discharge Caregiver contacted prior to discharge?    Caregiver contacted today      Daughter just called back and wants to use [facility] for pt,ot,nursing and aide. Referral sent and spoke with julio at [facility]. Chary Ayala She will need to get permission from the insurance company to add an aide. She states she will let me know when that has been done. Informed daughter and she is fine with this. She will be at home today awaiting for patient to come today and she is aware the home health for the aide is pending. CM called [facility] and spoke with julio and informed them patient is being discharged now so they can meet her at home to set up the oxygen. They will follow up. Patricia Zhao  RN BSN CRM        [phone]

## 2021-07-12 NOTE — DISCHARGE INSTRUCTIONS
HOSPITALIST DISCHARGE INSTRUCTIONS    NAME: Tarsha Aguilar   :  1940   MRN:  [id number]     Date/Time:  2021 8:28 AM    ADMIT DATE: 7/10/2021     DISCHARGE DATE: 2021     DISCHARGE DIAGNOSIS:  Acute hypoxic respiratory failure due to Atelactasis  Recent Hip fracture    MEDICATIONS:  · It is important that you take the medication exactly as they are prescribed. · Keep your medication in the bottles provided by the pharmacist and keep a list of the medication names, dosages, and times to be taken in your wallet. · Do not take other medications without consulting your doctor. Pain Management: per above medications    What to do at Home    Recommended diet:  Regular Diet    Recommended activity: Activity as tolerated    If you have questions regarding the hospital related prescriptions or hospital related issues [phone]. If you experience any of the following symptoms then please call your primary care physician or return to the emergency room if you cannot get hold of your doctor:  Fever, chills, nausea, vomiting, diarrhea, change in mentation, falling, bleeding, shortness of breath    Follow Up:  Dr. Nicole Craig MD  you are to call and set up an appointment to see them in 7-10 days. Information obtained by :  I understand that if any problems occur once I am at home I am to contact my physician. I understand and acknowledge receipt of the instructions indicated above.                                                                                                                                            Physician's or R.N.'s Signature                                                                  Date/Time                                                                                                                                              Patient or Representative Signature Date/Time

## 2021-07-15 LAB
BACTERIA SPEC CULT: NORMAL
SERVICE CMNT-IMP: NORMAL

## 2022-03-18 PROBLEM — A41.9 SEPSIS (HCC): Status: ACTIVE | Noted: 2021-07-10

## 2022-03-18 PROBLEM — J18.9 PNEUMONIA: Status: ACTIVE | Noted: 2021-07-10

## 2022-03-19 PROBLEM — S72.009A FEMORAL NECK FRACTURE (HCC): Status: ACTIVE | Noted: 2021-07-01

## 2022-08-02 ENCOUNTER — APPOINTMENT (OUTPATIENT)
Dept: GENERAL RADIOLOGY | Age: 82
End: 2022-08-02
Attending: STUDENT IN AN ORGANIZED HEALTH CARE EDUCATION/TRAINING PROGRAM
Payer: MEDICARE

## 2022-08-02 ENCOUNTER — HOSPITAL ENCOUNTER (EMERGENCY)
Age: 82
Discharge: HOME | End: 2022-08-03
Attending: EMERGENCY MEDICINE
Payer: MEDICARE

## 2022-08-02 DIAGNOSIS — J96.11 CHRONIC RESPIRATORY FAILURE WITH HYPOXIA (HCC): ICD-10-CM

## 2022-08-02 DIAGNOSIS — R07.89 ATYPICAL CHEST PAIN: Primary | ICD-10-CM

## 2022-08-02 LAB
ALBUMIN SERPL-MCNC: 3.1 G/DL (ref 3.5–5)
ALBUMIN/GLOB SERPL: 0.8 {RATIO} (ref 1.1–2.2)
ALP SERPL-CCNC: 100 U/L (ref 45–117)
ALT SERPL-CCNC: 14 U/L (ref 12–78)
ANION GAP SERPL CALC-SCNC: 2 MMOL/L (ref 5–15)
AST SERPL-CCNC: 24 U/L (ref 15–37)
BASOPHILS # BLD: 0 K/UL (ref 0–0.1)
BASOPHILS NFR BLD: 0 % (ref 0–1)
BILIRUB SERPL-MCNC: 0.4 MG/DL (ref 0.2–1)
BNP SERPL-MCNC: 421 PG/ML
BUN SERPL-MCNC: 13 MG/DL (ref 6–20)
BUN/CREAT SERPL: 17 (ref 12–20)
CALCIUM SERPL-MCNC: 9.6 MG/DL (ref 8.5–10.1)
CHLORIDE SERPL-SCNC: 93 MMOL/L (ref 97–108)
CO2 SERPL-SCNC: 41 MMOL/L (ref 21–32)
CREAT SERPL-MCNC: 0.75 MG/DL (ref 0.55–1.02)
DIFFERENTIAL METHOD BLD: ABNORMAL
EOSINOPHIL # BLD: 0.6 K/UL (ref 0–0.4)
EOSINOPHIL NFR BLD: 6 % (ref 0–7)
ERYTHROCYTE [DISTWIDTH] IN BLOOD BY AUTOMATED COUNT: 13.2 % (ref 11.5–14.5)
GLOBULIN SER CALC-MCNC: 3.7 G/DL (ref 2–4)
GLUCOSE SERPL-MCNC: 86 MG/DL (ref 65–100)
HCT VFR BLD AUTO: 40.5 % (ref 35–47)
HGB BLD-MCNC: 12.8 G/DL (ref 11.5–16)
IMM GRANULOCYTES # BLD AUTO: 0 K/UL (ref 0–0.04)
IMM GRANULOCYTES NFR BLD AUTO: 0 % (ref 0–0.5)
LYMPHOCYTES # BLD: 1.1 K/UL (ref 0.8–3.5)
LYMPHOCYTES NFR BLD: 10 % (ref 12–49)
MCH RBC QN AUTO: 30.5 PG (ref 26–34)
MCHC RBC AUTO-ENTMCNC: 31.6 G/DL (ref 30–36.5)
MCV RBC AUTO: 96.7 FL (ref 80–99)
MONOCYTES # BLD: 1 K/UL (ref 0–1)
MONOCYTES NFR BLD: 9 % (ref 5–13)
NEUTS SEG # BLD: 8.1 K/UL (ref 1.8–8)
NEUTS SEG NFR BLD: 75 % (ref 32–75)
NRBC # BLD: 0 K/UL (ref 0–0.01)
NRBC BLD-RTO: 0 PER 100 WBC
PLATELET # BLD AUTO: 245 K/UL (ref 150–400)
PMV BLD AUTO: 9.2 FL (ref 8.9–12.9)
POTASSIUM SERPL-SCNC: 4.2 MMOL/L (ref 3.5–5.1)
PROT SERPL-MCNC: 6.8 G/DL (ref 6.4–8.2)
RBC # BLD AUTO: 4.19 M/UL (ref 3.8–5.2)
SODIUM SERPL-SCNC: 136 MMOL/L (ref 136–145)
TROPONIN-HIGH SENSITIVITY: 13 NG/L (ref 0–51)
TROPONIN-HIGH SENSITIVITY: 15 NG/L (ref 0–51)
WBC # BLD AUTO: 10.8 K/UL (ref 3.6–11)

## 2022-08-02 PROCEDURE — 83880 ASSAY OF NATRIURETIC PEPTIDE: CPT

## 2022-08-02 PROCEDURE — 84484 ASSAY OF TROPONIN QUANT: CPT

## 2022-08-02 PROCEDURE — 99285 EMERGENCY DEPT VISIT HI MDM: CPT

## 2022-08-02 PROCEDURE — 93005 ELECTROCARDIOGRAM TRACING: CPT

## 2022-08-02 PROCEDURE — 71046 X-RAY EXAM CHEST 2 VIEWS: CPT

## 2022-08-02 PROCEDURE — 85025 COMPLETE CBC W/AUTO DIFF WBC: CPT

## 2022-08-02 PROCEDURE — 80053 COMPREHEN METABOLIC PANEL: CPT

## 2022-08-02 PROCEDURE — 36415 COLL VENOUS BLD VENIPUNCTURE: CPT

## 2022-08-03 VITALS
HEART RATE: 70 BPM | SYSTOLIC BLOOD PRESSURE: 136 MMHG | TEMPERATURE: 99.2 F | HEIGHT: 67 IN | DIASTOLIC BLOOD PRESSURE: 108 MMHG | RESPIRATION RATE: 18 BRPM | OXYGEN SATURATION: 96 % | BODY MASS INDEX: 32.11 KG/M2

## 2022-08-03 LAB
ATRIAL RATE: 73 BPM
CALCULATED P AXIS, ECG09: 4 DEGREES
CALCULATED R AXIS, ECG10: -32 DEGREES
CALCULATED T AXIS, ECG11: 59 DEGREES
DIAGNOSIS, 93000: NORMAL
P-R INTERVAL, ECG05: 186 MS
Q-T INTERVAL, ECG07: 428 MS
QRS DURATION, ECG06: 102 MS
QTC CALCULATION (BEZET), ECG08: 471 MS
VENTRICULAR RATE, ECG03: 73 BPM

## 2022-08-03 NOTE — ED PROVIDER NOTES
EMERGENCY DEPARTMENT HISTORY AND PHYSICAL EXAM     ----------------------------------------------------------------------------  Please note that this dictation was completed with [employer], the computer voice recognition software. Quite often unanticipated grammatical, syntax, homophones, and other interpretive errors are inadvertently transcribed by the computer software. Please disregard these errors. Please excuse any errors that have escaped final proofreading  ----------------------------------------------------------------------------      Date: 8/2/2022  Patient Name: Sameera Hunter    History of Presenting Illness     Chief Complaint   Patient presents with    Chest Pain     Around noon yesterday; family seen today by dispatch [facility]. 12 lead ekg per rescue NS; vitals wnl; recent treatment for uti and pnx. Pt baseline on Oxygen 2lpm NC        History Provided By:  Patient    HPI: Sameera Hunter is a 81 y.o. female, with significant pmhx of hypertension, previous stroke, cholesterol, breast cancer, reflux, seizure disorder and dementia who presents via EMS to the ED with c/o bilateral anterior and posterior chest pain. Patient reports that started yesterday and has been ongoing since that time. Notes that she took aspirin prior to coming to the hospital which has relieved her pain. Is having mild associated nausea without vomiting. Patient specifically notes that she does not follow with primary care or cardiology. Wears supplemental oxygen at baseline. Patient  specifically denies any associated fevers, chills, vomiting, diarrhea, abd pain, changes in BM, urinary sxs, or headache. Social Hx: denies tobacco  denies EtOH , denies recreational/Illicit Drugs    There are no other complaints, changes, or physical findings at this time.      PCP: Paolo Martinez MD    Allergies   Allergen Reactions    Codeine Rash     Doesn't do well with morphine and its derivatives, tolerates tramadol    Penicillins Swelling       Current Outpatient Medications   Medication Sig Dispense Refill    phenytoin ER (DILANTIN ER) 100 mg ER capsule Take 200 mg (2 capsules)  in the morning and 100 mg (1 capsule) in the evening 90 Capsule 2    atorvastatin (LIPITOR) 10 mg tablet Take 10 mg by mouth every evening. metoprolol succinate (TOPROL-XL) 50 mg XL tablet Take 50 mg by mouth daily. citalopram (CELEXA) 40 mg tablet Take 40 mg by mouth daily. diltiazem CD (CARTIA XT) 240 mg ER capsule Take 240 mg by mouth daily. ferrous sulfate (IRON) 325 mg (65 mg Iron) tablet Take 65 mg by mouth two (2) times a day. Past History     Past Medical History:  Past Medical History:   Diagnosis Date    Arthritis     Bladder disorder     Cancer ([facility]     breast cancer    Gastrointestinal disorder     acid reflux    GERD (gastroesophageal reflux disease)     High cholesterol     Hypertension     Other ill-defined conditions(799.89)     pneumonia    Psychiatric disorder     depression    Seizures ([facility]     Stroke ([facility]     Thromboembolus ([facility]        Past Surgical History:  Past Surgical History:   Procedure Laterality Date    HX CATARACT REMOVAL      bilateral    HX  SECTION      x 3    HX CHOLECYSTECTOMY      HX ORTHOPAEDIC      left arm, left leg, right wrist    HX ORTHOPAEDIC  14    ELBOW OPEN REDUCTION INTERNAL FIXATION (Right    HX OTHER SURGICAL      Brain surgery s/p CVA    HX OTHER SURGICAL      Bladder stimulator 12    HX OTHER SURGICAL      gastric bypass    TX BREAST SURGERY PROCEDURE UNLISTED      leftt breast lumpectomy       Family History:  Family History   Problem Relation Age of Onset    Stroke Father        Social History:  Social History     Tobacco Use    Smoking status: Never    Smokeless tobacco: Never   Substance Use Topics    Alcohol use: No    Drug use: No       Allergies:   Allergies   Allergen Reactions    Codeine Rash     Doesn't do well with morphine and its derivatives, tolerates tramadol    Penicillins Swelling         Review of Systems   Review of Systems   Constitutional: Negative. Negative for fever. Eyes: Negative. Respiratory:  Positive for chest tightness. Negative for shortness of breath. Cardiovascular:  Positive for chest pain. Gastrointestinal:  Negative for abdominal pain, nausea and vomiting. Endocrine: Negative. Genitourinary: Negative. Negative for difficulty urinating, dysuria and hematuria. Musculoskeletal: Negative. Skin: Negative. Neurological: Negative. Psychiatric/Behavioral:  Negative for suicidal ideas. All other systems reviewed and are negative. Physical Exam   Physical Exam  Vitals and nursing note reviewed. Constitutional:       General: She is not in acute distress. Appearance: She is well-developed. She is obese. She is ill-appearing (Chronically). She is not toxic-appearing or diaphoretic. HENT:      Head: Normocephalic and atraumatic. Nose: Nose normal.   Eyes:      General: No scleral icterus. Conjunctiva/sclera: Conjunctivae normal.   Neck:      Trachea: No tracheal deviation. Cardiovascular:      Rate and Rhythm: Normal rate and regular rhythm. Heart sounds: Normal heart sounds. No murmur heard. No friction rub. Pulmonary:      Effort: Pulmonary effort is normal. No respiratory distress. Breath sounds: Normal breath sounds. No stridor. No wheezing or rales. Abdominal:      General: Bowel sounds are normal. There is no distension. Palpations: Abdomen is soft. Tenderness: There is no abdominal tenderness. Musculoskeletal:         General: No tenderness. Normal range of motion. Cervical back: Normal range of motion. Skin:     General: Skin is warm and dry. Findings: No rash. Neurological:      Mental Status: She is alert and oriented to person, place, and time. Cranial Nerves: No cranial nerve deficit.    Psychiatric:         Behavior: Behavior normal. Thought Content: Thought content normal.         Judgment: Judgment normal.         Diagnostic Study Results     Labs -     Recent Results (from the past 12 hour(s))   CBC WITH AUTOMATED DIFF    Collection Time: 08/02/22  5:30 PM   Result Value Ref Range    WBC 10.8 3.6 - 11.0 K/uL    RBC 4.19 3.80 - 5.20 M/uL    HGB 12.8 11.5 - 16.0 g/dL    HCT 40.5 35.0 - 47.0 %    MCV 96.7 80.0 - 99.0 FL    MCH 30.5 26.0 - 34.0 PG    MCHC 31.6 30.0 - 36.5 g/dL    RDW 13.2 11.5 - 14.5 %    PLATELET 395 512 - 494 K/uL    MPV 9.2 8.9 - 12.9 FL    NRBC 0.0 0  WBC    ABSOLUTE NRBC 0.00 0.00 - 0.01 K/uL    NEUTROPHILS 75 32 - 75 %    LYMPHOCYTES 10 (L) 12 - 49 %    MONOCYTES 9 5 - 13 %    EOSINOPHILS 6 0 - 7 %    BASOPHILS 0 0 - 1 %    IMMATURE GRANULOCYTES 0 0.0 - 0.5 %    ABS. NEUTROPHILS 8.1 (H) 1.8 - 8.0 K/UL    ABS. LYMPHOCYTES 1.1 0.8 - 3.5 K/UL    ABS. MONOCYTES 1.0 0.0 - 1.0 K/UL    ABS. EOSINOPHILS 0.6 (H) 0.0 - 0.4 K/UL    ABS. BASOPHILS 0.0 0.0 - 0.1 K/UL    ABS. IMM. GRANS. 0.0 0.00 - 0.04 K/UL    DF AUTOMATED     METABOLIC PANEL, COMPREHENSIVE    Collection Time: 08/02/22  5:30 PM   Result Value Ref Range    Sodium 136 136 - 145 mmol/L    Potassium 4.2 3.5 - 5.1 mmol/L    Chloride 93 (L) 97 - 108 mmol/L    CO2 41 (HH) 21 - 32 mmol/L    Anion gap 2 (L) 5 - 15 mmol/L    Glucose 86 65 - 100 mg/dL    BUN 13 6 - 20 MG/DL    Creatinine 0.75 0.55 - 1.02 MG/DL    BUN/Creatinine ratio 17 12 - 20      GFR est AA >60 >60 ml/min/1.73m2    GFR est non-AA >60 >60 ml/min/1.73m2    Calcium 9.6 8.5 - 10.1 MG/DL    Bilirubin, total 0.4 0.2 - 1.0 MG/DL    ALT (SGPT) 14 12 - 78 U/L    AST (SGOT) 24 15 - 37 U/L    Alk.  phosphatase 100 45 - 117 U/L    Protein, total 6.8 6.4 - 8.2 g/dL    Albumin 3.1 (L) 3.5 - 5.0 g/dL    Globulin 3.7 2.0 - 4.0 g/dL    A-G Ratio 0.8 (L) 1.1 - 2.2     NT-PRO BNP    Collection Time: 08/02/22  5:30 PM   Result Value Ref Range    NT pro- <450 PG/ML   TROPONIN-HIGH SENSITIVITY    Collection Time: 08/02/22  5:30 PM   Result Value Ref Range    Troponin-High Sensitivity 15 0 - 51 ng/L   EKG, 12 LEAD, INITIAL    Collection Time: 08/02/22  5:38 PM   Result Value Ref Range    Ventricular Rate 73 BPM    Atrial Rate 73 BPM    P-R Interval 186 ms    QRS Duration 102 ms    Q-T Interval 428 ms    QTC Calculation (Bezet) 471 ms    Calculated P Axis 4 degrees    Calculated R Axis -32 degrees    Calculated T Axis 59 degrees    Diagnosis       Sinus rhythm with premature atrial complexes  Left axis deviation  Left ventricular hypertrophy with repolarization abnormality  When compared with ECG of 10-JUL-2021 09:49,  premature atrial complexes are now present  Minimal criteria for Septal infarct are no longer present     TROPONIN-HIGH SENSITIVITY    Collection Time: 08/02/22  9:55 PM   Result Value Ref Range    Troponin-High Sensitivity 13 0 - 51 ng/L       Radiologic Studies -   XR CHEST PA LAT   Final Result   No significant change. Small left pleural effusion and left basilar   airspace opacity with prominence the right hilum. CT Results  (Last 48 hours)      None          CXR Results  (Last 48 hours)                 08/02/22 1835  XR CHEST PA LAT Final result    Impression:  No significant change. Small left pleural effusion and left basilar   airspace opacity with prominence the right hilum. Narrative:  EXAM: XR CHEST PA LAT       INDICATION: Chest Pain       COMPARISON: 7/10/2021. FINDINGS: PA and lateral radiographs of the chest demonstrate persistent left   pleural effusion and left basilar airspace opacity. Prominence of the right   hilum is unchanged. M[place] Ra Heart size mildly enlarged. . The bones and soft tissues are   within normal limits. Medical Decision Making   I am the first provider for this patient. I reviewed the vital signs, available nursing notes, past medical history, past surgical history, family history and social history.     Vital Signs-Reviewed the patient's vital signs. Patient Vitals for the past 12 hrs:   Temp Pulse Resp BP SpO2   08/02/22 2053 -- 71 19 (!) 146/64 96 %   08/02/22 2025 -- 76 24 (!) 155/72 94 %   08/02/22 1953 -- 77 22 (!) 145/80 95 %   08/02/22 1724 99.2 °F (37.3 °C) 67 16 (!) 150/66 99 %       Pulse Oximetry Analysis - 96% on RA, normal  Rate: 71 bpm  Rhythm: Normal sinus rhythm      Provider Notes (Medical Decision Making):     DDX:  Arrhythmia, pneumonia, pulmonary edema, pleurisy, musculoskeletal pain    Plan:  EKG, labs, troponin, chest x-ray    Impression:  Atypical chest pain    ED Course:   Initial assessment performed. The patients presenting problems have been discussed, and they are in agreement with the care plan formulated and outlined with them. I have encouraged them to ask questions as they arise throughout their visit. I reviewed the nursing notes and and vital signs from today's visit, as well as the electronic medical record system for any past medical records that were available that may contribute to the patients current condition, including previous admission in July for acute respiratory failure with hypoxia    Nursing notes will be reviewed as they become available in realtime while the pt has been in the ED. Ellie Michelle MD    EKG interpretation 4620: NSR, L Axis, rate 73; , , QTc 471; NO STEMI; interpreted by Ellie Michelle MD    I personally reviewed/interpreted pt's imaging. Agree with official read by radiology as noted above. Ellie Michelle MD    10:34 PM   Progress note:  Pt noted to be feeling better, ready for discharge. Discussed lab and imaging findings with pt, specifically noting no new findings on cxr. Pt will follow up with pcp as instructed. All questions have been answered, pt voiced understanding and agreement with plan. Specific return precautions provided in addition to instructions for pt to return to the ED immediately should sx worsen at any time.   Ellie Michelle MD Critical Care Time:     none      Diagnosis     Clinical Impression:   1. Atypical chest pain    2. Chronic respiratory failure with hypoxia (HCC)        PLAN:  1. Current Discharge Medication List        2. Follow-up Information       Follow up With Specialties Details Why Contact Info    Buffy Garcia MD Family Medicine Schedule an appointment as soon as possible for a visit in 2 days  [address] [place] [address] [id number] [phone]            Return to ED if worse     Disposition:  10:35 PM   The patient's results have been reviewed with family and/or caregiver. They verbally convey their understanding and agreement of the patient's signs, symptoms, diagnosis, treatment and prognosis and additionally agree to follow up as recommended in the discharge instructions or to return to the Emergency Room should the patient's condition change prior to their follow-up appointment. The family and/or caregiver verbally agrees with the care-plan and all of their questions have been answered. The discharge instructions have also been provided to the them with educational information regarding the patient's diagnosis as well a list of reasons why the patient would want to return to the ER prior to their follow-up appointment should their condition change.   Claudene Nanny, MD

## 2022-08-03 NOTE — ED NOTES
Provided discharge instructions to patient's daughter, Daniel Champion, who will be returning to ED to pick patient up.

## 2022-08-03 NOTE — ED NOTES
Patient states she began experiencing chest pain and back pain starting yesterday. Pain is a 6/10 at this time, worsens with movement. Patient is on 2L NC O2 at baseline and arrives on 2L at this time. Patient was recently treated for a UTI, denies urinary complaints at this time.

## 2022-08-03 NOTE — ED NOTES
Patient brought by wheelchair to [facility] and discharged by personal transportation. Discharge instructions with patient.

## 2022-08-03 NOTE — ED NOTES
Patient declined brief changing. Asked patient multiple times to confirm that she did not want the brief changed, patient states that she does not like the briefs provided by the ED and that she wanted to wait for her own briefs.

## 2022-08-03 NOTE — DISCHARGE INSTRUCTIONS
It was a pleasure taking care of you in our Emergency Department today. We know that when you come to [facility], you are entrusting us with your health, comfort, and safety. Our physicians and nurses honor that trust, and truly appreciate the opportunity to care for you and your loved ones. We also value your feedback. If you receive a survey about your Emergency Department experience today, please fill it out. We care about our patients' feedback, and we listen to what you have to say. Thank you!       Dr. Rishi Mello MD.

## 2022-11-26 NOTE — PROGRESS NOTES
Problem: Mobility Impaired (Adult and Pediatric)  Goal: *Acute Goals and Plan of Care (Insert Text)  Description: FUNCTIONAL STATUS PRIOR TO ADMISSION: Per pt she ambulated using a rolling walker, but when asked more significant questions pt gave inconsistent answers, so not sure if pt used a rolling walker or rollator. HOME SUPPORT PRIOR TO ADMISSION: Pt lives with her daughter and son in law and reports she has a caregiver come in the home to assist her. Unclear how many days the caregiver comes a week or how  many hours she stays each visit. Pt endorses receiving assistance for bathing and dressing. Physical Therapy Goals  Initiated 7/3/2021  1. Patient will move from supine to sit and sit to supine , scoot up and down, and roll side to side in bed with minimal assistance/contact guard assist within 7 day(s). 2.  Patient will transfer from bed to chair and chair to bed with minimal assistance/contact guard assist using the least restrictive device within 7 day(s). 3.  Patient will perform sit to stand with minimal assistance/contact guard assist within 7 day(s). 4.  Patient will ambulate with minimal assistance/contact guard assist for 50 feet with the least restrictive device within 7 day(s). Outcome: Not Progressing Towards Goal   PHYSICAL THERAPY TREATMENT  Patient: Maria Teresa Olivia (6 y.o. female)  Date: 7/5/2021  Diagnosis: [facility] [S72.009A] <principal problem not specified>  Procedure(s) (LRB):  LEFT HIP HEMIARTHROPLASTY (Left) 3 Days Post-Op  Precautions: Fall, WBAT  Chart, physical therapy assessment, plan of care and goals were reviewed. ASSESSMENT  Patient continues with skilled PT services and is not progressing towards goals. Pt presents with decreased strength and alertness. Pt received on 1LPM with o2 stats at 96%. Attempted activity ton RA. Pt preformed bed mobility at max/total A x2. Pt with impaired sitting balance with RUE unable to hold weigh through it. Pt with a LOB anteriorly requiring assistance to regain balance. After 1-2 mins sitting EOB pt reported feeling faint. Pt assisted back  supine. Pts BP stable but was looking pale. Pts o2 decreased  to 84% requiring 2LPM o2 and o2 stats increased to 93% Pt with BM requiring clean up. Assisted nursing with rolling at max A x2. Current Level of Function Impacting Discharge (mobility/balance): bed mobility at max A/total A x2    Other factors to consider for discharge: decreased strength, pain          PLAN :  Patient continues to benefit from skilled intervention to address the above impairments. Continue treatment per established plan of care. to address goals. Recommendation for discharge: (in order for the patient to meet his/her long term goals)  Therapy up to 5 days/week in SNF setting    This discharge recommendation:  Has been made in collaboration with the attending provider and/or case management    IF patient discharges home will need the following DME: to be determined (TBD)       SUBJECTIVE:   Patient stated  It hurts.     OBJECTIVE DATA SUMMARY:   Critical Behavior:  Neurologic State: Alert  Orientation Level: Oriented X4  Cognition: Follows commands  Safety/Judgement: Fall prevention, Decreased insight into deficits  Functional Mobility Training:  Bed Mobility:  Rolling: Maximum assistance;Assist x2  Supine to Sit: Maximum assistance;Assist x2  Sit to Supine: Maximum assistance; Total assistance;Assist x2  Scooting: Maximum assistance              Balance:  Sitting: Impaired  Sitting - Static: Poor (constant support); Occassional        Pain Rating:  Pt reported increased pain with mobility.      Activity Tolerance:   Poor, desaturates with exertion and requires oxygen, requires rest breaks, and observed SOB with activity      After treatment patient left in no apparent distress:   Supine in bed, Call bell within reach, and Caregiver / family present    COMMUNICATION/COLLABORATION:   The patients plan of care was discussed with: Registered nurse.      Gabriela Gregory, PTA   Time Calculation: 23 mins regular

## 2023-01-01 ENCOUNTER — HOSPITAL ENCOUNTER (EMERGENCY)
Facility: HOSPITAL | Age: 83
End: 2023-06-28
Attending: EMERGENCY MEDICINE
Payer: MEDICARE

## 2023-01-01 ENCOUNTER — TRANSCRIBE ORDERS (OUTPATIENT)
Facility: HOSPITAL | Age: 83
End: 2023-01-01

## 2023-01-01 VITALS — RESPIRATION RATE: 18 BRPM | DIASTOLIC BLOOD PRESSURE: 82 MMHG | SYSTOLIC BLOOD PRESSURE: 99 MMHG

## 2023-01-01 DIAGNOSIS — R19.00 ABDOMINAL SWELLING: ICD-10-CM

## 2023-01-01 DIAGNOSIS — R91.1 PULMONARY NODULE: ICD-10-CM

## 2023-01-01 DIAGNOSIS — J96.01 ACUTE RESPIRATORY FAILURE WITH HYPOXIA AND HYPERCAPNIA (HCC): ICD-10-CM

## 2023-01-01 DIAGNOSIS — J18.9 UNRESOLVED PNEUMONIA: ICD-10-CM

## 2023-01-01 DIAGNOSIS — I46.9 DEATH DUE TO CARDIAC ARREST (HCC): ICD-10-CM

## 2023-01-01 DIAGNOSIS — G93.40 ENCEPHALOPATHY ACUTE: Primary | ICD-10-CM

## 2023-01-01 DIAGNOSIS — R22.2 MASS IN CHEST: Primary | ICD-10-CM

## 2023-01-01 DIAGNOSIS — J96.02 ACUTE RESPIRATORY FAILURE WITH HYPOXIA AND HYPERCAPNIA (HCC): ICD-10-CM

## 2023-01-01 DIAGNOSIS — Z86.73 HISTORY OF ISCHEMIC STROKE: ICD-10-CM

## 2023-01-01 PROCEDURE — 99285 EMERGENCY DEPT VISIT HI MDM: CPT

## 2023-01-01 RX ORDER — ACETAMINOPHEN 650 MG/1
650 SUPPOSITORY RECTAL EVERY 4 HOURS PRN
Status: DISCONTINUED | OUTPATIENT
Start: 2023-01-01 | End: 2023-01-01 | Stop reason: HOSPADM

## 2023-01-01 RX ORDER — HYDROMORPHONE HYDROCHLORIDE 1 MG/ML
0.25 INJECTION, SOLUTION INTRAMUSCULAR; INTRAVENOUS; SUBCUTANEOUS
Status: DISCONTINUED | OUTPATIENT
Start: 2023-01-01 | End: 2023-01-01 | Stop reason: HOSPADM

## 2023-01-01 RX ORDER — HYDROMORPHONE HYDROCHLORIDE 1 MG/ML
0.5 INJECTION, SOLUTION INTRAMUSCULAR; INTRAVENOUS; SUBCUTANEOUS
Status: DISCONTINUED | OUTPATIENT
Start: 2023-01-01 | End: 2023-01-01 | Stop reason: HOSPADM

## 2023-01-01 RX ORDER — GLYCOPYRROLATE 0.2 MG/ML
0.2 INJECTION INTRAMUSCULAR; INTRAVENOUS EVERY 4 HOURS PRN
Status: DISCONTINUED | OUTPATIENT
Start: 2023-01-01 | End: 2023-01-01 | Stop reason: HOSPADM

## 2023-01-01 RX ORDER — ONDANSETRON 2 MG/ML
4 INJECTION INTRAMUSCULAR; INTRAVENOUS EVERY 6 HOURS PRN
Status: DISCONTINUED | OUTPATIENT
Start: 2023-01-01 | End: 2023-01-01 | Stop reason: HOSPADM

## 2023-01-01 RX ORDER — LORAZEPAM 2 MG/ML
1 CONCENTRATE ORAL
Status: DISCONTINUED | OUTPATIENT
Start: 2023-01-01 | End: 2023-01-01 | Stop reason: HOSPADM

## 2023-06-06 PROBLEM — R53.1 LEFT-SIDED WEAKNESS: Status: ACTIVE | Noted: 2023-01-01

## 2023-06-06 PROBLEM — I61.9 CVA (CEREBROVASCULAR ACCIDENT DUE TO INTRACEREBRAL HEMORRHAGE) (HCC): Status: ACTIVE | Noted: 2023-01-01

## 2023-06-06 PROBLEM — I63.9 CEREBROVASCULAR ACCIDENT (CVA) DETERMINED BY CLINICAL ASSESSMENT (HCC): Status: ACTIVE | Noted: 2023-01-01

## 2023-06-06 PROBLEM — R55 SYNCOPE: Status: ACTIVE | Noted: 2023-01-01

## 2023-06-06 PROBLEM — E86.0 DEHYDRATION: Status: ACTIVE | Noted: 2023-01-01

## 2023-06-06 PROBLEM — R47.81 SLURRED SPEECH: Status: ACTIVE | Noted: 2023-01-01

## 2023-06-06 PROBLEM — I61.9 CVA (CEREBROVASCULAR ACCIDENT DUE TO INTRACEREBRAL HEMORRHAGE) (HCC): Status: RESOLVED | Noted: 2023-01-01 | Resolved: 2023-01-01

## 2023-06-06 PROBLEM — R79.89 ELEVATED LACTIC ACID LEVEL: Status: ACTIVE | Noted: 2023-01-01

## 2023-06-07 PROBLEM — I63.311 CEREBROVASCULAR ACCIDENT (CVA) DUE TO THROMBOSIS OF RIGHT MIDDLE CEREBRAL ARTERY (HCC): Status: ACTIVE | Noted: 2023-01-01

## 2023-06-07 PROBLEM — R53.1 ACUTE LEFT-SIDED WEAKNESS: Status: ACTIVE | Noted: 2023-01-01

## 2023-06-07 PROBLEM — N39.0 ACUTE UTI: Status: ACTIVE | Noted: 2023-01-01

## 2023-06-08 PROBLEM — R56.9 SEIZURE (HCC): Status: ACTIVE | Noted: 2023-01-01

## 2024-02-05 NOTE — PERIOP NOTES
TRANSFER - IN REPORT:    Verbal report received from Heriberto RN(name) on Terris Rho  being received from [facility](unit) for ordered procedure      Report consisted of patients Situation, Background, Assessment and   Recommendations(SBAR). Information from the following report(s) SBAR, Kardex, Intake/Output, MAR, Recent Results, Med Rec Status, Cardiac Rhythm NSR to Sinus Tachy and Procedure Verification was reviewed with the receiving nurse. Opportunity for questions and clarification was provided. Assessment completed upon patients arrival to unit and care assumed. medications

## (undated) DEVICE — PADDING CST 6IN STERILE --

## (undated) DEVICE — Device

## (undated) DEVICE — BLADE SAW W11XL77.5MM THK1.23MM CUT THK1.17MM S STL RECIP

## (undated) DEVICE — STERILE POLYISOPRENE POWDER-FREE SURGICAL GLOVES WITH EMOLLIENT COATING: Brand: PROTEXIS

## (undated) DEVICE — 6619 2 PTNT ISO SYS INCISE AREA&LT;(&GT;&&LT;)&GT;P: Brand: STERI-DRAPE™ IOBAN™ 2

## (undated) DEVICE — STERILE POLYISOPRENE POWDER-FREE SURGICAL GLOVES: Brand: PROTEXIS

## (undated) DEVICE — 4-PORT MANIFOLD: Brand: NEPTUNE 2

## (undated) DEVICE — T4 HOOD

## (undated) DEVICE — TRAP FLUID BUFFALO FLTR

## (undated) DEVICE — SYR LR LCK 1ML GRAD NSAF 30ML --

## (undated) DEVICE — SUTURE STRATAFIX SYMMETRIC PDS + SZ 1 L18IN ABSRB VLT L48MM SXPP1A400

## (undated) DEVICE — REM POLYHESIVE ADULT PATIENT RETURN ELECTRODE: Brand: VALLEYLAB

## (undated) DEVICE — DERMABOND SKIN ADH 0.7ML -- DERMABOND ADVANCED 12/BX

## (undated) DEVICE — SUTURE VCRL L54CM ABSRB VLT POLYGLACTIN 910 W/O NDL J617H

## (undated) DEVICE — NEEDLE HYPO 21GA L1.5IN INTRAMUSCULAR S STL LATCH BVL UP

## (undated) DEVICE — LABEL MED MRMC ORTH STRL

## (undated) DEVICE — SOLUTION SURG PREP 26 CC PURPREP

## (undated) DEVICE — SUTURE VCRL SZ 2-0 L36IN ABSRB UD L36MM CT-1 1/2 CIR J945H

## (undated) DEVICE — SOLUTION IRRIG 1000ML STRL H2O USP PLAS POUR BTL

## (undated) DEVICE — DEVICE TRNSF SPIK STL 2008S] MICROTEK MEDICAL INC]

## (undated) DEVICE — COVER,MAYO STAND,STERILE: Brand: MEDLINE

## (undated) DEVICE — SUTURE VCRL SZ 2-0 L36IN ABSRB UD L40MM CT 1/2 CIR J957H

## (undated) DEVICE — BLADE ELECTRODE: Brand: EDGE

## (undated) DEVICE — 450 ML BOTTLE OF 0.05% CHLORHEXIDINE GLUCONATE IN 99.95% STERILE WATER FOR IRRIGATION, USP AND APPLICATOR.: Brand: IRRISEPT ANTIMICROBIAL WOUND LAVAGE

## (undated) DEVICE — HANDPIECE SET WITH BONE CLEANING TIP AND SUCTION TUBE: Brand: INTERPULSE

## (undated) DEVICE — C-ARM: Brand: UNBRANDED

## (undated) DEVICE — KIT POS FOAM HANA TBL

## (undated) DEVICE — PREP SKN CHLRAPRP APL 26ML STR --

## (undated) DEVICE — DRESSING HYDROCOLLOID BORDER 35X10 IN ALUM PRIMASEAL

## (undated) DEVICE — SUTURE ETHBND EXCEL SZ 2 L30IN NONABSORBABLE GRN L40MM V-37 MX69G

## (undated) DEVICE — SMOKE EVACUATION PENCIL: Brand: VALLEYLAB

## (undated) DEVICE — SPONGE GZ W4XL4IN COT 12 PLY TYP VII WVN C FLD DSGN

## (undated) DEVICE — SOL TOP ALC ISO 70% 4OZ --